# Patient Record
Sex: MALE | Race: WHITE | Employment: OTHER | URBAN - NONMETROPOLITAN AREA
[De-identification: names, ages, dates, MRNs, and addresses within clinical notes are randomized per-mention and may not be internally consistent; named-entity substitution may affect disease eponyms.]

---

## 2018-05-19 LAB
AVERAGE GLUCOSE: NORMAL
CHOLESTEROL, TOTAL: 127 MG/DL
CHOLESTEROL/HDL RATIO: NORMAL
CREATININE, URINE: 165.2
HBA1C MFR BLD: 6.5 %
HDLC SERPL-MCNC: 36 MG/DL (ref 35–70)
LDL CHOLESTEROL CALCULATED: 74 MG/DL (ref 0–160)
MICROALBUMIN/CREAT 24H UR: 2 MG/G{CREAT}
MICROALBUMIN/CREAT UR-RTO: 1.2
TRIGL SERPL-MCNC: 93 MG/DL
VLDLC SERPL CALC-MCNC: NORMAL MG/DL

## 2018-10-11 LAB
DIABETIC RETINOPATHY: NEGATIVE
DIABETIC RETINOPATHY: NEGATIVE

## 2019-05-24 ENCOUNTER — OFFICE VISIT (OUTPATIENT)
Dept: FAMILY MEDICINE CLINIC | Age: 69
End: 2019-05-24
Payer: MEDICARE

## 2019-05-24 ENCOUNTER — HOSPITAL ENCOUNTER (OUTPATIENT)
Age: 69
Discharge: HOME | End: 2019-05-26
Payer: MEDICARE

## 2019-05-24 VITALS
TEMPERATURE: 97.5 F | OXYGEN SATURATION: 92 % | SYSTOLIC BLOOD PRESSURE: 124 MMHG | HEART RATE: 98 BPM | DIASTOLIC BLOOD PRESSURE: 76 MMHG

## 2019-05-24 DIAGNOSIS — E55.9 VITAMIN D INSUFFICIENCY: ICD-10-CM

## 2019-05-24 DIAGNOSIS — N32.0 BLADDER NECK OBSTRUCTION: ICD-10-CM

## 2019-05-24 DIAGNOSIS — M51.9 LUMBAR DISC DISEASE: Primary | ICD-10-CM

## 2019-05-24 DIAGNOSIS — I10 ESSENTIAL HYPERTENSION: ICD-10-CM

## 2019-05-24 DIAGNOSIS — E11.9 TYPE 2 DIABETES MELLITUS WITHOUT COMPLICATION, WITHOUT LONG-TERM CURRENT USE OF INSULIN (HCC): ICD-10-CM

## 2019-05-24 DIAGNOSIS — M51.9 LUMBAR DISC DISEASE: ICD-10-CM

## 2019-05-24 DIAGNOSIS — E78.5 HYPERLIPIDEMIA, UNSPECIFIED HYPERLIPIDEMIA TYPE: ICD-10-CM

## 2019-05-24 DIAGNOSIS — F41.9 ANXIETY: ICD-10-CM

## 2019-05-24 LAB
ALBUMIN SERPL-MCNC: 3.7 G/DL (ref 3.5–5.2)
ALP BLD-CCNC: 102 U/L (ref 40–129)
ALT SERPL-CCNC: 14 U/L (ref 0–40)
AMPHETAMINE SCREEN, URINE: NOT DETECTED
ANION GAP SERPL CALCULATED.3IONS-SCNC: 14 MMOL/L (ref 7–16)
AST SERPL-CCNC: 19 U/L (ref 0–39)
BARBITURATE SCREEN URINE: NOT DETECTED
BASOPHILS ABSOLUTE: 0.04 E9/L (ref 0–0.2)
BASOPHILS RELATIVE PERCENT: 0.5 % (ref 0–2)
BENZODIAZEPINE SCREEN, URINE: NOT DETECTED
BILIRUB SERPL-MCNC: 0.4 MG/DL (ref 0–1.2)
BUN BLDV-MCNC: 24 MG/DL (ref 8–23)
CALCIUM SERPL-MCNC: 9.7 MG/DL (ref 8.6–10.2)
CANNABINOID SCREEN URINE: NOT DETECTED
CHLORIDE BLD-SCNC: 95 MMOL/L (ref 98–107)
CHOLESTEROL, TOTAL: 114 MG/DL (ref 0–199)
CO2: 31 MMOL/L (ref 22–29)
COCAINE METABOLITE SCREEN URINE: NOT DETECTED
CREAT SERPL-MCNC: 0.8 MG/DL (ref 0.7–1.2)
CREATININE URINE: 36 MG/DL (ref 40–278)
EOSINOPHILS ABSOLUTE: 0.47 E9/L (ref 0.05–0.5)
EOSINOPHILS RELATIVE PERCENT: 5.5 % (ref 0–6)
GFR AFRICAN AMERICAN: >60
GFR NON-AFRICAN AMERICAN: >60 ML/MIN/1.73
GLUCOSE BLD-MCNC: 139 MG/DL (ref 74–99)
HBA1C MFR BLD: 7 % (ref 4–5.6)
HCT VFR BLD CALC: 47.8 % (ref 37–54)
HDLC SERPL-MCNC: 39 MG/DL
HEMOGLOBIN: 13.9 G/DL (ref 12.5–16.5)
IMMATURE GRANULOCYTES #: 0.07 E9/L
IMMATURE GRANULOCYTES %: 0.8 % (ref 0–5)
LDL CHOLESTEROL CALCULATED: 62 MG/DL (ref 0–99)
LYMPHOCYTES ABSOLUTE: 1.42 E9/L (ref 1.5–4)
LYMPHOCYTES RELATIVE PERCENT: 16.6 % (ref 20–42)
MCH RBC QN AUTO: 25.9 PG (ref 26–35)
MCHC RBC AUTO-ENTMCNC: 29.1 % (ref 32–34.5)
MCV RBC AUTO: 89 FL (ref 80–99.9)
METHADONE SCREEN, URINE: NOT DETECTED
MICROALBUMIN UR-MCNC: <12 MG/L
MICROALBUMIN/CREAT UR-RTO: ABNORMAL (ref 0–30)
MONOCYTES ABSOLUTE: 0.68 E9/L (ref 0.1–0.95)
MONOCYTES RELATIVE PERCENT: 7.9 % (ref 2–12)
NEUTROPHILS ABSOLUTE: 5.9 E9/L (ref 1.8–7.3)
NEUTROPHILS RELATIVE PERCENT: 68.7 % (ref 43–80)
OPIATE SCREEN URINE: NOT DETECTED
PDW BLD-RTO: 17.6 FL (ref 11.5–15)
PHENCYCLIDINE SCREEN URINE: NOT DETECTED
PLATELET # BLD: 315 E9/L (ref 130–450)
PMV BLD AUTO: 10.7 FL (ref 7–12)
POTASSIUM SERPL-SCNC: 3.9 MMOL/L (ref 3.5–5)
PROPOXYPHENE SCREEN: NOT DETECTED
PROSTATE SPECIFIC ANTIGEN: 2.87 NG/ML (ref 0–4)
RBC # BLD: 5.37 E12/L (ref 3.8–5.8)
SODIUM BLD-SCNC: 140 MMOL/L (ref 132–146)
TOTAL PROTEIN: 7.3 G/DL (ref 6.4–8.3)
TRIGL SERPL-MCNC: 65 MG/DL (ref 0–149)
URIC ACID, SERUM: 9.2 MG/DL (ref 3.4–7)
VITAMIN D 25-HYDROXY: 36 NG/ML (ref 30–100)
VLDLC SERPL CALC-MCNC: 13 MG/DL
WBC # BLD: 8.6 E9/L (ref 4.5–11.5)

## 2019-05-24 PROCEDURE — 85025 COMPLETE CBC W/AUTO DIFF WBC: CPT

## 2019-05-24 PROCEDURE — 36415 COLL VENOUS BLD VENIPUNCTURE: CPT

## 2019-05-24 PROCEDURE — G0103 PSA SCREENING: HCPCS

## 2019-05-24 PROCEDURE — 82306 VITAMIN D 25 HYDROXY: CPT

## 2019-05-24 PROCEDURE — 99215 OFFICE O/P EST HI 40 MIN: CPT | Performed by: FAMILY MEDICINE

## 2019-05-24 PROCEDURE — 82044 UR ALBUMIN SEMIQUANTITATIVE: CPT

## 2019-05-24 PROCEDURE — 83036 HEMOGLOBIN GLYCOSYLATED A1C: CPT

## 2019-05-24 PROCEDURE — 82570 ASSAY OF URINE CREATININE: CPT

## 2019-05-24 PROCEDURE — 80061 LIPID PANEL: CPT

## 2019-05-24 PROCEDURE — 80307 DRUG TEST PRSMV CHEM ANLYZR: CPT

## 2019-05-24 PROCEDURE — 80053 COMPREHEN METABOLIC PANEL: CPT

## 2019-05-24 PROCEDURE — 84550 ASSAY OF BLOOD/URIC ACID: CPT

## 2019-05-24 RX ORDER — SULFAMETHOXAZOLE AND TRIMETHOPRIM 800; 160 MG/1; MG/1
TABLET ORAL
Refills: 4 | COMMUNITY
Start: 2019-03-14 | End: 2019-05-24

## 2019-05-24 RX ORDER — HYDROCHLOROTHIAZIDE 25 MG/1
TABLET ORAL
Qty: 90 TABLET | Refills: 1 | Status: SHIPPED | OUTPATIENT
Start: 2019-05-24 | End: 2019-11-25 | Stop reason: SDUPTHER

## 2019-05-24 RX ORDER — LOSARTAN POTASSIUM 50 MG/1
TABLET ORAL
Refills: 1 | COMMUNITY
Start: 2019-02-28 | End: 2019-05-24 | Stop reason: SDUPTHER

## 2019-05-24 RX ORDER — HYDROCODONE BITARTRATE AND ACETAMINOPHEN 7.5; 325 MG/1; MG/1
1 TABLET ORAL EVERY 6 HOURS PRN
Qty: 120 TABLET | Refills: 0 | Status: SHIPPED | OUTPATIENT
Start: 2019-05-24 | End: 2019-07-03 | Stop reason: SDUPTHER

## 2019-05-24 RX ORDER — NAPROXEN 500 MG/1
TABLET ORAL
Refills: 1 | COMMUNITY
Start: 2019-02-28 | End: 2019-05-24 | Stop reason: SDUPTHER

## 2019-05-24 RX ORDER — FUROSEMIDE 40 MG/1
TABLET ORAL
Qty: 180 TABLET | Refills: 1 | Status: SHIPPED | OUTPATIENT
Start: 2019-05-24 | End: 2019-11-25 | Stop reason: SDUPTHER

## 2019-05-24 RX ORDER — POTASSIUM CHLORIDE 20 MEQ/1
TABLET, EXTENDED RELEASE ORAL
Qty: 180 TABLET | Refills: 1 | Status: SHIPPED | OUTPATIENT
Start: 2019-05-24 | End: 2019-05-24

## 2019-05-24 RX ORDER — PIOGLITAZONEHYDROCHLORIDE 45 MG/1
TABLET ORAL
Refills: 1 | COMMUNITY
Start: 2019-02-28 | End: 2019-05-24 | Stop reason: SDUPTHER

## 2019-05-24 RX ORDER — HYDROCODONE BITARTRATE AND ACETAMINOPHEN 7.5; 325 MG/1; MG/1
TABLET ORAL
Refills: 0 | COMMUNITY
Start: 2019-02-28 | End: 2019-05-24 | Stop reason: SDUPTHER

## 2019-05-24 RX ORDER — FUROSEMIDE 40 MG/1
TABLET ORAL
Refills: 1 | COMMUNITY
Start: 2019-02-28 | End: 2019-05-24 | Stop reason: SDUPTHER

## 2019-05-24 RX ORDER — SULFAMETHOXAZOLE AND TRIMETHOPRIM 400; 80 MG/1; MG/1
1 TABLET ORAL 2 TIMES DAILY
Qty: 20 TABLET | Refills: 1 | Status: SHIPPED | OUTPATIENT
Start: 2019-05-24 | End: 2019-06-13

## 2019-05-24 RX ORDER — POTASSIUM CHLORIDE 20 MEQ/1
TABLET, EXTENDED RELEASE ORAL
COMMUNITY
Start: 2018-08-28 | End: 2019-05-24

## 2019-05-24 RX ORDER — PIOGLITAZONEHYDROCHLORIDE 45 MG/1
TABLET ORAL
Qty: 90 TABLET | Refills: 1 | Status: SHIPPED | OUTPATIENT
Start: 2019-05-24 | End: 2019-11-25 | Stop reason: SDUPTHER

## 2019-05-24 RX ORDER — ATORVASTATIN CALCIUM 20 MG/1
TABLET, FILM COATED ORAL
Qty: 90 TABLET | Refills: 1 | Status: SHIPPED | OUTPATIENT
Start: 2019-05-24 | End: 2019-11-25 | Stop reason: SDUPTHER

## 2019-05-24 RX ORDER — SULFAMETHOXAZOLE AND TRIMETHOPRIM 400; 80 MG/1; MG/1
1 TABLET ORAL DAILY
Qty: 20 TABLET | Refills: 1 | Status: SHIPPED | OUTPATIENT
Start: 2019-05-24 | End: 2019-05-24 | Stop reason: SDUPTHER

## 2019-05-24 RX ORDER — LORAZEPAM 1 MG/1
TABLET ORAL
Qty: 180 TABLET | Refills: 0 | Status: SHIPPED | OUTPATIENT
Start: 2019-05-24 | End: 2019-08-26 | Stop reason: SDUPTHER

## 2019-05-24 RX ORDER — NAPROXEN 500 MG/1
TABLET ORAL
Qty: 180 TABLET | Refills: 1 | Status: SHIPPED | OUTPATIENT
Start: 2019-05-24 | End: 2019-07-01 | Stop reason: SDUPTHER

## 2019-05-24 RX ORDER — POTASSIUM CHLORIDE 20 MEQ/1
TABLET, EXTENDED RELEASE ORAL
Qty: 90 TABLET | Refills: 1 | Status: SHIPPED | OUTPATIENT
Start: 2019-05-24 | End: 2019-11-25 | Stop reason: SDUPTHER

## 2019-05-24 RX ORDER — GLIMEPIRIDE 4 MG/1
TABLET ORAL
Qty: 180 TABLET | Refills: 1 | Status: SHIPPED | OUTPATIENT
Start: 2019-05-24 | End: 2019-11-25 | Stop reason: SDUPTHER

## 2019-05-24 RX ORDER — HYDROCHLOROTHIAZIDE 25 MG/1
TABLET ORAL
Refills: 1 | COMMUNITY
Start: 2019-02-28 | End: 2019-05-24 | Stop reason: SDUPTHER

## 2019-05-24 RX ORDER — LORAZEPAM 1 MG/1
TABLET ORAL
Refills: 0 | COMMUNITY
Start: 2019-02-28 | End: 2019-05-24 | Stop reason: SDUPTHER

## 2019-05-24 RX ORDER — GLIMEPIRIDE 4 MG/1
TABLET ORAL
Refills: 1 | COMMUNITY
Start: 2019-02-28 | End: 2019-05-24 | Stop reason: SDUPTHER

## 2019-05-24 RX ORDER — ATORVASTATIN CALCIUM 20 MG/1
TABLET, FILM COATED ORAL
Refills: 1 | COMMUNITY
Start: 2019-02-28 | End: 2019-05-24 | Stop reason: SDUPTHER

## 2019-05-24 RX ORDER — POTASSIUM CHLORIDE 20 MEQ/1
TABLET, EXTENDED RELEASE ORAL
Refills: 1 | COMMUNITY
Start: 2019-02-28 | End: 2019-05-24 | Stop reason: SDUPTHER

## 2019-05-24 RX ORDER — LOSARTAN POTASSIUM 50 MG/1
TABLET ORAL
Qty: 90 TABLET | Refills: 1 | Status: SHIPPED | OUTPATIENT
Start: 2019-05-24 | End: 2019-11-25 | Stop reason: SDUPTHER

## 2019-05-24 RX ORDER — MULTIVIT-MIN/IRON/FOLIC ACID/K 18-600-40
2000 CAPSULE ORAL
COMMUNITY
End: 2020-08-04 | Stop reason: SDUPTHER

## 2019-05-24 RX ORDER — EXENATIDE 2 MG/.85ML
INJECTION, SUSPENSION, EXTENDED RELEASE SUBCUTANEOUS
Refills: 5 | COMMUNITY
Start: 2019-05-10 | End: 2020-05-28 | Stop reason: SDUPTHER

## 2019-05-24 NOTE — PROGRESS NOTES
5/24/19    Chief Complaint   Patient presents with    Diabetes    Hyperlipidemia    Hypertension    Joint Pain        S: here for PE of chronic medical problems, med recheck/refill, FBW, Oarrs review, drug screen. No family history on file. No past surgical history on file. Social History     Tobacco Use    Smoking status: Not on file   Substance Use Topics    Alcohol use: Not on file    Drug use: Not on file       ROS:  No CP, No palpitations,   No sob, No cough,   No abd pain, No heartburn,   No headaches,   No tingling, No numbness, No weakness,   No bowel changes, No hematochezia, No melena,  No bladder changes, No hematuria  No skin rashes, No skin lesions. No vision changes, No hearing changes,   No polyuria, polydipsia, polyphagia. Stable mood. ROS otherwise negative unless as listed in HPI. Chart reviewed and updated where appropriate for PMH, Fam, and Soc Hx. Physical Exam   /76   Pulse 98   Temp 97.5 °F (36.4 °C)   SpO2 92%   Wt Readings from Last 3 Encounters:   No data found for Wt       Constitutional:    He is oriented to person, place, and time. He appears well-developed and well-nourished. HENT:    Right Ear: Tympanic membrane, external ear and ear canal normal.    Left Ear: Tympanic membrane, external ear and ear canal normal.    Nose: Nose normal.    Mouth/Throat: Oropharynx is clear and moist.   Eyes:    Conjunctivae are normal.    Pupils are equal, round, and reactive to light. EOMI. Neck:    Normal range of motion. No thyromegaly or nodules noted. No bruit. Cardiovascular:    Normal rate, regular rhythm and normal heart sounds. No murmur. No gallop and no friction rub. Pulmonary/Chest:    Effort normal and breath sounds normal.    No wheezes. No rales or rhonchi. Abdominal:    Soft. Bowel sounds are normal.    No distension. No tenderness. Musculoskeletal: Patient here on electric scooter. Normal range of motion.      No joint swelling noted.    Peripheral edema noted. Neurological:    He is alert and oriented to person, place, and time. Motor and sensation grossly intact. Skin:    Skin is warm and dry. No rashes, lesions. Psychiatric:    He has a normal mood and affect. Normal groom and dress. Current Outpatient Medications on File Prior to Visit   Medication Sig Dispense Refill    BYDUREON BCISE 2 MG/0.85ML AUIJ INJECT 0.85 ML Q WEEK  5    Cholecalciferol (VITAMIN D) 2000 units CAPS capsule Take 2,000 Units by mouth      aspirin 81 MG tablet Take 81 mg by mouth daily       No current facility-administered medications on file prior to visit. There is no problem list on file for this patient. Assessment / Robi Linn was seen today for diabetes, hyperlipidemia, hypertension and joint pain. Diagnoses and all orders for this visit:    Lumbar disc disease  -     HYDROcodone-acetaminophen (NORCO) 7.5-325 MG per tablet; Take 1 tablet by mouth every 6 hours as needed for Pain for up to 30 days.  -     URINE DRUG SCREEN; Future    Anxiety  -     LORazepam (ATIVAN) 1 MG tablet; TK 1 T PO BID    Essential hypertension  -     CBC Auto Differential; Future  -     Comprehensive Metabolic Panel; Future  -     Uric Acid; Future    Hyperlipidemia, unspecified hyperlipidemia type  -     Lipid Panel  -     CK isoenzymes; Future    Type 2 diabetes mellitus without complication, without long-term current use of insulin ([facility])  -     Hemoglobin A1C; Future  -     Microalbumin / Creatinine Urine Ratio; Future    Vitamin D insufficiency  -     Vitamin D 25 Hydroxy; Future    Bladder neck obstruction  -     Psa screening;  Future    Other orders  -     potassium chloride (KLOR-CON M) 20 MEQ extended release tablet; TK 1 T PO QD  -     pioglitazone (ACTOS) 45 MG tablet; TK 1 T PO QD  -     naproxen (NAPROSYN) 500 MG tablet; TK 1 T PO BID  -     losartan (COZAAR) 50 MG tablet; TK 1 T PO QD  -     hydrochlorothiazide (HYDRODIURIL) 25 MG tablet; TK 1 T PO QD  -     furosemide (LASIX) 40 MG tablet; TK 1 T PO BID  -     glimepiride (AMARYL) 4 MG tablet; TK 1 T PO BID B MEALS  -     atorvastatin (LIPITOR) 20 MG tablet; TK 1 T PO Q NIGHT  -     sulfamethoxazole-trimethoprim (BACTRIM) 400-80 MG per tablet; Take 1 tablet by mouth daily for 10 days      Reviewed Pmhx, meds, diet, Oarrs. Rx written. Recheck 3 months. No follow-ups on file. Patient counseled to follow up sooner or seek more acute care if symptoms worsening. Electronically signed by Kai Escobar DO on 5/24/2019    This note may have been created using dictation software.  Efforts were made to reduce grammatical or syntax errors, but some may persist.

## 2019-05-29 LAB — TOTAL CK: 25 U/L (ref 20–200)

## 2019-05-31 ENCOUNTER — TELEPHONE (OUTPATIENT)
Dept: FAMILY MEDICINE CLINIC | Age: 69
End: 2019-05-31

## 2019-07-01 RX ORDER — NAPROXEN 500 MG/1
TABLET ORAL
Qty: 180 TABLET | Refills: 1 | Status: SHIPPED | OUTPATIENT
Start: 2019-07-01 | End: 2019-11-25 | Stop reason: SDUPTHER

## 2019-07-03 DIAGNOSIS — M51.9 LUMBAR DISC DISEASE: ICD-10-CM

## 2019-07-03 RX ORDER — HYDROCODONE BITARTRATE AND ACETAMINOPHEN 7.5; 325 MG/1; MG/1
1 TABLET ORAL EVERY 6 HOURS PRN
Qty: 120 TABLET | Refills: 0 | Status: CANCELLED | OUTPATIENT
Start: 2019-07-03 | End: 2019-08-02

## 2019-07-03 RX ORDER — HYDROCODONE BITARTRATE AND ACETAMINOPHEN 7.5; 325 MG/1; MG/1
1 TABLET ORAL EVERY 6 HOURS PRN
Qty: 120 TABLET | Refills: 0 | Status: SHIPPED | OUTPATIENT
Start: 2019-07-03 | End: 2019-07-03 | Stop reason: SDUPTHER

## 2019-07-03 RX ORDER — HYDROCODONE BITARTRATE AND ACETAMINOPHEN 7.5; 325 MG/1; MG/1
1 TABLET ORAL EVERY 6 HOURS PRN
Qty: 120 TABLET | Refills: 0 | Status: SHIPPED | OUTPATIENT
Start: 2019-07-31 | End: 2019-08-26 | Stop reason: SDUPTHER

## 2019-08-16 ENCOUNTER — TELEPHONE (OUTPATIENT)
Dept: ADMINISTRATIVE | Age: 69
End: 2019-08-16

## 2019-08-16 NOTE — TELEPHONE ENCOUNTER
Pt called states that he received a call for AWV however, states that there was a nurse that came to his home and performed same visit he believes declined on scheduling at this time-cc

## 2019-08-26 ENCOUNTER — HOSPITAL ENCOUNTER (OUTPATIENT)
Age: 69
Discharge: HOME | End: 2019-08-28
Payer: MEDICARE

## 2019-08-26 ENCOUNTER — OFFICE VISIT (OUTPATIENT)
Dept: FAMILY MEDICINE CLINIC | Age: 69
End: 2019-08-26
Payer: MEDICARE

## 2019-08-26 VITALS
OXYGEN SATURATION: 94 % | HEART RATE: 84 BPM | SYSTOLIC BLOOD PRESSURE: 128 MMHG | DIASTOLIC BLOOD PRESSURE: 82 MMHG | TEMPERATURE: 97.7 F

## 2019-08-26 DIAGNOSIS — F41.9 ANXIETY: ICD-10-CM

## 2019-08-26 DIAGNOSIS — E78.5 HYPERLIPIDEMIA, UNSPECIFIED HYPERLIPIDEMIA TYPE: ICD-10-CM

## 2019-08-26 DIAGNOSIS — I10 ESSENTIAL HYPERTENSION: Primary | ICD-10-CM

## 2019-08-26 DIAGNOSIS — E11.9 TYPE 2 DIABETES MELLITUS WITHOUT COMPLICATION, WITHOUT LONG-TERM CURRENT USE OF INSULIN (HCC): ICD-10-CM

## 2019-08-26 DIAGNOSIS — Z12.5 ENCOUNTER FOR SCREENING FOR MALIGNANT NEOPLASM OF PROSTATE: ICD-10-CM

## 2019-08-26 DIAGNOSIS — E03.9 HYPOTHYROIDISM, UNSPECIFIED TYPE: ICD-10-CM

## 2019-08-26 DIAGNOSIS — E55.9 VITAMIN D INSUFFICIENCY: ICD-10-CM

## 2019-08-26 DIAGNOSIS — M51.9 LUMBAR DISC DISEASE: ICD-10-CM

## 2019-08-26 DIAGNOSIS — I10 ESSENTIAL HYPERTENSION: ICD-10-CM

## 2019-08-26 LAB
ALBUMIN SERPL-MCNC: 3.7 G/DL (ref 3.5–5.2)
ALP BLD-CCNC: 97 U/L (ref 40–129)
ALT SERPL-CCNC: 17 U/L (ref 0–40)
AMPHETAMINE SCREEN, URINE: NOT DETECTED
ANION GAP SERPL CALCULATED.3IONS-SCNC: 10 MMOL/L (ref 7–16)
AST SERPL-CCNC: 20 U/L (ref 0–39)
BARBITURATE SCREEN URINE: NOT DETECTED
BASOPHILS ABSOLUTE: 0.03 E9/L (ref 0–0.2)
BASOPHILS RELATIVE PERCENT: 0.4 % (ref 0–2)
BENZODIAZEPINE SCREEN, URINE: NOT DETECTED
BILIRUB SERPL-MCNC: 0.3 MG/DL (ref 0–1.2)
BUN BLDV-MCNC: 28 MG/DL (ref 8–23)
CALCIUM SERPL-MCNC: 9.4 MG/DL (ref 8.6–10.2)
CANNABINOID SCREEN URINE: NOT DETECTED
CHLORIDE BLD-SCNC: 98 MMOL/L (ref 98–107)
CHOLESTEROL, TOTAL: 132 MG/DL (ref 0–199)
CO2: 33 MMOL/L (ref 22–29)
COCAINE METABOLITE SCREEN URINE: NOT DETECTED
CREAT SERPL-MCNC: 0.8 MG/DL (ref 0.7–1.2)
CREATININE URINE: 31 MG/DL (ref 40–278)
EOSINOPHILS ABSOLUTE: 0.17 E9/L (ref 0.05–0.5)
EOSINOPHILS RELATIVE PERCENT: 2 % (ref 0–6)
GFR AFRICAN AMERICAN: >60
GFR NON-AFRICAN AMERICAN: >60 ML/MIN/1.73
GLUCOSE BLD-MCNC: 169 MG/DL (ref 74–99)
HBA1C MFR BLD: 7.4 % (ref 4–5.6)
HCT VFR BLD CALC: 47 % (ref 37–54)
HDLC SERPL-MCNC: 36 MG/DL
HEMOGLOBIN: 14 G/DL (ref 12.5–16.5)
IMMATURE GRANULOCYTES #: 0.06 E9/L
IMMATURE GRANULOCYTES %: 0.7 % (ref 0–5)
LDL CHOLESTEROL CALCULATED: 71 MG/DL (ref 0–99)
LYMPHOCYTES ABSOLUTE: 1.37 E9/L (ref 1.5–4)
LYMPHOCYTES RELATIVE PERCENT: 16.1 % (ref 20–42)
Lab: NORMAL
MCH RBC QN AUTO: 26.7 PG (ref 26–35)
MCHC RBC AUTO-ENTMCNC: 29.8 % (ref 32–34.5)
MCV RBC AUTO: 89.5 FL (ref 80–99.9)
METHADONE SCREEN, URINE: NOT DETECTED
MICROALBUMIN UR-MCNC: <12 MG/L
MICROALBUMIN/CREAT UR-RTO: ABNORMAL (ref 0–30)
MONOCYTES ABSOLUTE: 0.74 E9/L (ref 0.1–0.95)
MONOCYTES RELATIVE PERCENT: 8.7 % (ref 2–12)
NEUTROPHILS ABSOLUTE: 6.12 E9/L (ref 1.8–7.3)
NEUTROPHILS RELATIVE PERCENT: 72.1 % (ref 43–80)
OPIATE SCREEN URINE: NOT DETECTED
PDW BLD-RTO: 17.8 FL (ref 11.5–15)
PHENCYCLIDINE SCREEN URINE: NOT DETECTED
PLATELET # BLD: 295 E9/L (ref 130–450)
PMV BLD AUTO: 10.8 FL (ref 7–12)
POTASSIUM SERPL-SCNC: 3.7 MMOL/L (ref 3.5–5)
PROPOXYPHENE SCREEN: NOT DETECTED
PROSTATE SPECIFIC ANTIGEN: 2.53 NG/ML (ref 0–4)
RBC # BLD: 5.25 E12/L (ref 3.8–5.8)
SODIUM BLD-SCNC: 141 MMOL/L (ref 132–146)
T4 FREE: 1.34 NG/DL (ref 0.93–1.7)
TOTAL CK: 25 U/L (ref 20–200)
TOTAL PROTEIN: 7 G/DL (ref 6.4–8.3)
TRIGL SERPL-MCNC: 127 MG/DL (ref 0–149)
TSH SERPL DL<=0.05 MIU/L-ACNC: 1.1 UIU/ML (ref 0.27–4.2)
URIC ACID, SERUM: 8.9 MG/DL (ref 3.4–7)
VITAMIN D 25-HYDROXY: 34 NG/ML (ref 30–100)
VLDLC SERPL CALC-MCNC: 25 MG/DL
WBC # BLD: 8.5 E9/L (ref 4.5–11.5)

## 2019-08-26 PROCEDURE — 99214 OFFICE O/P EST MOD 30 MIN: CPT | Performed by: FAMILY MEDICINE

## 2019-08-26 PROCEDURE — 82306 VITAMIN D 25 HYDROXY: CPT

## 2019-08-26 PROCEDURE — 84439 ASSAY OF FREE THYROXINE: CPT

## 2019-08-26 PROCEDURE — 82550 ASSAY OF CK (CPK): CPT

## 2019-08-26 PROCEDURE — 84550 ASSAY OF BLOOD/URIC ACID: CPT

## 2019-08-26 PROCEDURE — 85025 COMPLETE CBC W/AUTO DIFF WBC: CPT

## 2019-08-26 PROCEDURE — 83036 HEMOGLOBIN GLYCOSYLATED A1C: CPT

## 2019-08-26 PROCEDURE — 80307 DRUG TEST PRSMV CHEM ANLYZR: CPT

## 2019-08-26 PROCEDURE — 36415 COLL VENOUS BLD VENIPUNCTURE: CPT

## 2019-08-26 PROCEDURE — 80061 LIPID PANEL: CPT

## 2019-08-26 PROCEDURE — 82570 ASSAY OF URINE CREATININE: CPT

## 2019-08-26 PROCEDURE — 80053 COMPREHEN METABOLIC PANEL: CPT

## 2019-08-26 PROCEDURE — 82044 UR ALBUMIN SEMIQUANTITATIVE: CPT

## 2019-08-26 PROCEDURE — G0103 PSA SCREENING: HCPCS

## 2019-08-26 PROCEDURE — 84443 ASSAY THYROID STIM HORMONE: CPT

## 2019-08-26 RX ORDER — HYDROCODONE BITARTRATE AND ACETAMINOPHEN 7.5; 325 MG/1; MG/1
1 TABLET ORAL EVERY 6 HOURS PRN
Qty: 120 TABLET | Refills: 0 | Status: SHIPPED | OUTPATIENT
Start: 2019-09-25 | End: 2019-08-26 | Stop reason: SDUPTHER

## 2019-08-26 RX ORDER — HYDROCODONE BITARTRATE AND ACETAMINOPHEN 7.5; 325 MG/1; MG/1
1 TABLET ORAL EVERY 6 HOURS PRN
Qty: 120 TABLET | Refills: 0 | Status: SHIPPED | OUTPATIENT
Start: 2019-10-24 | End: 2019-11-23

## 2019-08-26 RX ORDER — LORAZEPAM 1 MG/1
TABLET ORAL
Qty: 180 TABLET | Refills: 0 | Status: SHIPPED | OUTPATIENT
Start: 2019-08-26 | End: 2019-11-25 | Stop reason: SDUPTHER

## 2019-08-26 RX ORDER — HYDROCODONE BITARTRATE AND ACETAMINOPHEN 7.5; 325 MG/1; MG/1
1 TABLET ORAL EVERY 6 HOURS PRN
Qty: 120 TABLET | Refills: 0 | Status: SHIPPED | OUTPATIENT
Start: 2019-08-26 | End: 2019-08-26 | Stop reason: SDUPTHER

## 2019-08-26 ASSESSMENT — ENCOUNTER SYMPTOMS
COLOR CHANGE: 0
EYE DISCHARGE: 0
SORE THROAT: 0
NAUSEA: 0
BACK PAIN: 1
TROUBLE SWALLOWING: 0
SINUS PAIN: 0
COUGH: 0
WHEEZING: 0
ABDOMINAL PAIN: 0
ALLERGIC/IMMUNOLOGIC NEGATIVE: 1
EYE PAIN: 0
SHORTNESS OF BREATH: 0
DIARRHEA: 0
CONSTIPATION: 0
VOMITING: 0
CHEST TIGHTNESS: 0

## 2019-08-26 ASSESSMENT — PATIENT HEALTH QUESTIONNAIRE - PHQ9
SUM OF ALL RESPONSES TO PHQ QUESTIONS 1-9: 0
SUM OF ALL RESPONSES TO PHQ QUESTIONS 1-9: 0
1. LITTLE INTEREST OR PLEASURE IN DOING THINGS: 0
2. FEELING DOWN, DEPRESSED OR HOPELESS: 0
SUM OF ALL RESPONSES TO PHQ9 QUESTIONS 1 & 2: 0

## 2019-08-26 NOTE — PROGRESS NOTES
Rfl:     aspirin 81 MG tablet, Take 81 mg by mouth daily, Disp: , Rfl:     pioglitazone (ACTOS) 45 MG tablet, TK 1 T PO QD, Disp: 90 tablet, Rfl: 1    losartan (COZAAR) 50 MG tablet, TK 1 T PO QD, Disp: 90 tablet, Rfl: 1    hydrochlorothiazide (HYDRODIURIL) 25 MG tablet, TK 1 T PO QD, Disp: 90 tablet, Rfl: 1    furosemide (LASIX) 40 MG tablet, TK 1 T PO BID, Disp: 180 tablet, Rfl: 1    glimepiride (AMARYL) 4 MG tablet, TK 1 T PO BID B MEALS, Disp: 180 tablet, Rfl: 1    atorvastatin (LIPITOR) 20 MG tablet, TK 1 T PO Q NIGHT, Disp: 90 tablet, Rfl: 1    potassium chloride (KLOR-CON M) 20 MEQ extended release tablet, TK 1 T PO QD, Disp: 90 tablet, Rfl: 1  Allergies   Allergen Reactions    Ciprofloxacin Hcl     Cleocin [Clindamycin Hcl]     Clindamycin/Lincomycin     Codeine     Eucerin [Albolene]     Januvia [Sitagliptin]     Keflex [Cephalexin]     Lisinopril     Metformin And Related     Zithromax [Azithromycin]        No past medical history on file. No past surgical history on file. No family history on file.   Social History     Socioeconomic History    Marital status: Unknown     Spouse name: Not on file    Number of children: Not on file    Years of education: Not on file    Highest education level: Not on file   Occupational History    Not on file   Social Needs    Financial resource strain: Not on file    Food insecurity:     Worry: Not on file     Inability: Not on file    Transportation needs:     Medical: Not on file     Non-medical: Not on file   Tobacco Use    Smoking status: Never Smoker    Smokeless tobacco: Never Used   Substance and Sexual Activity    Alcohol use: Not on file    Drug use: Not on file    Sexual activity: Not on file   Lifestyle    Physical activity:     Days per week: Not on file     Minutes per session: Not on file    Stress: Not on file   Relationships    Social connections:     Talks on phone: Not on file     Gets together: Not on file     Attends

## 2019-10-08 RX ORDER — HYDROCODONE BITARTRATE AND ACETAMINOPHEN 7.5; 325 MG/1; MG/1
1 TABLET ORAL 4 TIMES DAILY
COMMUNITY
End: 2019-11-25 | Stop reason: SDUPTHER

## 2019-10-08 RX ORDER — PEN NEEDLE, DIABETIC 30 GX3/16"
NEEDLE, DISPOSABLE MISCELLANEOUS 2 TIMES DAILY
COMMUNITY
End: 2021-05-03 | Stop reason: SDUPTHER

## 2019-10-08 RX ORDER — SULFAMETHOXAZOLE AND TRIMETHOPRIM 800; 160 MG/1; MG/1
1 TABLET ORAL 2 TIMES DAILY
COMMUNITY
End: 2020-03-03

## 2019-10-08 SDOH — HEALTH STABILITY: MENTAL HEALTH: HOW OFTEN DO YOU HAVE A DRINK CONTAINING ALCOHOL?: NEVER

## 2019-11-25 ENCOUNTER — OFFICE VISIT (OUTPATIENT)
Dept: FAMILY MEDICINE CLINIC | Age: 69
End: 2019-11-25
Payer: MEDICARE

## 2019-11-25 VITALS
TEMPERATURE: 98.4 F | OXYGEN SATURATION: 94 % | HEIGHT: 70 IN | HEART RATE: 98 BPM | SYSTOLIC BLOOD PRESSURE: 118 MMHG | DIASTOLIC BLOOD PRESSURE: 86 MMHG

## 2019-11-25 DIAGNOSIS — I10 ESSENTIAL HYPERTENSION: ICD-10-CM

## 2019-11-25 DIAGNOSIS — F41.9 ANXIETY: ICD-10-CM

## 2019-11-25 DIAGNOSIS — I89.0 LYMPHEDEMA: ICD-10-CM

## 2019-11-25 DIAGNOSIS — M51.16 LUMBAR DISC DISEASE WITH RADICULOPATHY: Primary | ICD-10-CM

## 2019-11-25 DIAGNOSIS — E11.9 TYPE 2 DIABETES MELLITUS WITHOUT COMPLICATION, WITHOUT LONG-TERM CURRENT USE OF INSULIN (HCC): ICD-10-CM

## 2019-11-25 DIAGNOSIS — J01.90 ACUTE NON-RECURRENT SINUSITIS, UNSPECIFIED LOCATION: ICD-10-CM

## 2019-11-25 PROCEDURE — 99214 OFFICE O/P EST MOD 30 MIN: CPT | Performed by: FAMILY MEDICINE

## 2019-11-25 PROCEDURE — 90653 IIV ADJUVANT VACCINE IM: CPT | Performed by: FAMILY MEDICINE

## 2019-11-25 PROCEDURE — G0008 ADMIN INFLUENZA VIRUS VAC: HCPCS | Performed by: FAMILY MEDICINE

## 2019-11-25 RX ORDER — NAPROXEN 500 MG/1
TABLET ORAL
Qty: 180 TABLET | Refills: 1 | Status: SHIPPED
Start: 2019-11-25 | End: 2020-03-03

## 2019-11-25 RX ORDER — HYDROCHLOROTHIAZIDE 25 MG/1
TABLET ORAL
Qty: 90 TABLET | Refills: 1 | Status: SHIPPED
Start: 2019-11-25 | End: 2020-03-03 | Stop reason: ALTCHOICE

## 2019-11-25 RX ORDER — LOSARTAN POTASSIUM 50 MG/1
TABLET ORAL
Qty: 90 TABLET | Refills: 1 | Status: SHIPPED
Start: 2019-11-25 | End: 2020-03-03 | Stop reason: SDUPTHER

## 2019-11-25 RX ORDER — AMOXICILLIN AND CLAVULANATE POTASSIUM 875; 125 MG/1; MG/1
1 TABLET, FILM COATED ORAL 2 TIMES DAILY
Qty: 20 TABLET | Refills: 0 | Status: SHIPPED | OUTPATIENT
Start: 2019-11-25 | End: 2019-12-05

## 2019-11-25 RX ORDER — PIOGLITAZONEHYDROCHLORIDE 45 MG/1
TABLET ORAL
Qty: 90 TABLET | Refills: 1 | Status: SHIPPED
Start: 2019-11-25 | End: 2020-03-03 | Stop reason: SDUPTHER

## 2019-11-25 RX ORDER — HYDROCODONE BITARTRATE AND ACETAMINOPHEN 7.5; 325 MG/1; MG/1
1 TABLET ORAL 4 TIMES DAILY
Qty: 120 TABLET | Refills: 0 | Status: SHIPPED | OUTPATIENT
Start: 2019-12-24 | End: 2019-11-25 | Stop reason: SDUPTHER

## 2019-11-25 RX ORDER — HYDROCODONE BITARTRATE AND ACETAMINOPHEN 7.5; 325 MG/1; MG/1
1 TABLET ORAL 4 TIMES DAILY
Qty: 120 TABLET | Refills: 0 | Status: SHIPPED | OUTPATIENT
Start: 2019-11-25 | End: 2019-11-25 | Stop reason: SDUPTHER

## 2019-11-25 RX ORDER — ATORVASTATIN CALCIUM 20 MG/1
TABLET, FILM COATED ORAL
Qty: 90 TABLET | Refills: 1 | Status: SHIPPED
Start: 2019-11-25 | End: 2020-03-03 | Stop reason: SDUPTHER

## 2019-11-25 RX ORDER — FUROSEMIDE 40 MG/1
TABLET ORAL
Qty: 180 TABLET | Refills: 1 | Status: SHIPPED
Start: 2019-11-25 | End: 2020-03-03 | Stop reason: ALTCHOICE

## 2019-11-25 RX ORDER — LORAZEPAM 1 MG/1
TABLET ORAL
Qty: 180 TABLET | Refills: 0 | Status: SHIPPED | OUTPATIENT
Start: 2019-11-25 | End: 2020-03-03 | Stop reason: SDUPTHER

## 2019-11-25 RX ORDER — POTASSIUM CHLORIDE 20 MEQ/1
TABLET, EXTENDED RELEASE ORAL
Qty: 90 TABLET | Refills: 1 | Status: SHIPPED
Start: 2019-11-25 | End: 2020-03-03

## 2019-11-25 RX ORDER — ALBUTEROL SULFATE 90 UG/1
2 AEROSOL, METERED RESPIRATORY (INHALATION) 4 TIMES DAILY PRN
Qty: 1 INHALER | Refills: 3 | Status: SHIPPED
Start: 2019-11-25 | End: 2020-04-30 | Stop reason: SDUPTHER

## 2019-11-25 RX ORDER — HYDROCODONE BITARTRATE AND ACETAMINOPHEN 7.5; 325 MG/1; MG/1
1 TABLET ORAL 4 TIMES DAILY
Qty: 120 TABLET | Refills: 0 | Status: SHIPPED | OUTPATIENT
Start: 2020-01-23 | End: 2020-03-03 | Stop reason: SDUPTHER

## 2019-11-25 RX ORDER — GLIMEPIRIDE 4 MG/1
TABLET ORAL
Qty: 180 TABLET | Refills: 1 | Status: SHIPPED
Start: 2019-11-25 | End: 2020-03-03 | Stop reason: SDUPTHER

## 2020-01-10 ENCOUNTER — TELEPHONE (OUTPATIENT)
Dept: FAMILY MEDICINE CLINIC | Age: 70
End: 2020-01-10

## 2020-01-10 NOTE — TELEPHONE ENCOUNTER
Wife calling in stating patient's groin area is all swollen and the wound on his leg is still open would like to know if an antibiotic could be called in .

## 2020-01-17 ENCOUNTER — OFFICE VISIT (OUTPATIENT)
Dept: FAMILY MEDICINE CLINIC | Age: 70
End: 2020-01-17
Payer: MEDICARE

## 2020-01-17 VITALS — SYSTOLIC BLOOD PRESSURE: 116 MMHG | HEART RATE: 68 BPM | DIASTOLIC BLOOD PRESSURE: 62 MMHG | TEMPERATURE: 97.5 F

## 2020-01-17 PROCEDURE — 99213 OFFICE O/P EST LOW 20 MIN: CPT | Performed by: FAMILY MEDICINE

## 2020-01-17 NOTE — PROGRESS NOTES
tablet, TK 1 T PO QD, Disp: 90 tablet, Rfl: 1    naproxen (NAPROSYN) 500 MG tablet, TK 1 T PO BID, Disp: 180 tablet, Rfl: 1    potassium chloride (KLOR-CON M) 20 MEQ extended release tablet, TK 1 T PO QD, Disp: 90 tablet, Rfl: 1    albuterol sulfate  (90 Base) MCG/ACT inhaler, Inhale 2 puffs into the lungs 4 times daily as needed (2 puffs), Disp: 1 Inhaler, Rfl: 3    [START ON 1/23/2020] HYDROcodone-acetaminophen (NORCO) 7.5-325 MG per tablet, Take 1 tablet by mouth 4 times daily for 30 days. , Disp: 120 tablet, Rfl: 0    Insulin Pen Needle (PEN NEEDLES) 31G X 5 MM MISC, by Does not apply route 2 times daily, Disp: , Rfl:     sulfamethoxazole-trimethoprim (BACTRIM DS) 800-160 MG per tablet, Take 1 tablet by mouth 2 times daily, Disp: , Rfl:     BYDUREON BCISE 2 MG/0.85ML AUIJ, INJECT 0.85 ML Q WEEK, Disp: , Rfl: 5    Cholecalciferol (VITAMIN D) 2000 units CAPS capsule, Take 2,000 Units by mouth, Disp: , Rfl:     aspirin 81 MG tablet, Take 81 mg by mouth daily, Disp: , Rfl:   Allergies   Allergen Reactions    Cleocin [Clindamycin Hcl]     Clindamycin/Lincomycin     Codeine     Eucerin [Albolene]     Glucophage [Metformin Hcl]     Januvia [Sitagliptin]     Lisinopril     Metformin And Related     Zithromax [Azithromycin]     Ciprofloxacin Hcl Swelling and Rash    Keflex [Cephalexin] Swelling and Rash       Past Medical History:   Diagnosis Date    DDD (degenerative disc disease), cervical     DDD (degenerative disc disease), thoracic     Diabetes mellitus ([address]     Follicular adenocarcinoma, moderately differentiated ([address]     rectum     Hyperlipidemia     Hypertension     Obesity     morbid     NICOLLE (obstructive sleep apnea)     Peripheral edema     Stasis dermatitis     Thyroid nodule     Vitamin D insufficiency      Past Surgical History:   Procedure Laterality Date    CERVICAL FUSION      COLONOSCOPY      EYE SURGERY      cataract with lol implants - [facility]U    HERNIA REPAIR umbilical     RECTAL SURGERY      transanal excision rectal cancer     SIGMOIDOSCOPY      procotoscopy      Family History   Problem Relation Age of Onset    Cancer Mother         breast     Diabetes Father     Diabetes Brother     Heart Attack Brother     Other Brother         valvular heart disease    Cancer Brother         lung      Social History     Socioeconomic History    Marital status:      Spouse name: Not on file    Number of children: Not on file    Years of education: Not on file    Highest education level: Not on file   Occupational History    Not on file   Social Needs    Financial resource strain: Not on file    Food insecurity:     Worry: Not on file     Inability: Not on file    Transportation needs:     Medical: Not on file     Non-medical: Not on file   Tobacco Use    Smoking status: Never Smoker    Smokeless tobacco: Never Used   Substance and Sexual Activity    Alcohol use: Never     Frequency: Never    Drug use: Never    Sexual activity: Not on file   Lifestyle    Physical activity:     Days per week: Not on file     Minutes per session: Not on file    Stress: Not on file   Relationships    Social connections:     Talks on phone: Not on file     Gets together: Not on file     Attends Caoda[employer] service: Not on file     Active member of club or organization: Not on file     Attends meetings of clubs or organizations: Not on file     Relationship status: Not on file    Intimate partner violence:     Fear of current or ex partner: Not on file     Emotionally abused: Not on file     Physically abused: Not on file     Forced sexual activity: Not on file   Other Topics Concern    Not on file   Social History Narrative    Not on file       Vitals:    01/17/20 1527   BP: 116/62   Pulse: 68   Temp: 97.5 °F (36.4 °C)   TempSrc: Temporal       Physical Exam   Constitutional: He is oriented to person, place, and time. He appears well-nourished.    Appears chronically

## 2020-02-05 ENCOUNTER — TELEPHONE (OUTPATIENT)
Dept: FAMILY MEDICINE CLINIC | Age: 70
End: 2020-02-05

## 2020-02-05 NOTE — TELEPHONE ENCOUNTER
Will you follow for homecare; Skilled nursing and physical therapy? He will be released from [place] [facility] today.  Dx shortness of breath, type 2 diabetes, acute diastolic heart failure and kidney disease

## 2020-02-24 RX ORDER — HYDROCODONE BITARTRATE AND ACETAMINOPHEN 7.5; 325 MG/1; MG/1
1 TABLET ORAL 4 TIMES DAILY
Qty: 120 TABLET | Refills: 0 | OUTPATIENT
Start: 2020-02-24 | End: 2020-03-25

## 2020-02-24 RX ORDER — LORAZEPAM 1 MG/1
TABLET ORAL
Qty: 180 TABLET | Refills: 0 | OUTPATIENT
Start: 2020-02-24 | End: 2024-02-24

## 2020-02-24 NOTE — TELEPHONE ENCOUNTER
Pt's wife [place] called in requesting refills for the pt's Ativan and Norco be printed out for her to come  from the office so she can get these medications filled for when the pt returns home after being released from the nursing home. Pt's wife states the pt is going to be released from the nursing home either this Wednesday or Thursday. Please advise.

## 2020-03-03 ENCOUNTER — OFFICE VISIT (OUTPATIENT)
Dept: FAMILY MEDICINE CLINIC | Age: 70
End: 2020-03-03
Payer: MEDICARE

## 2020-03-03 VITALS
HEIGHT: 70 IN | SYSTOLIC BLOOD PRESSURE: 118 MMHG | BODY MASS INDEX: 45.1 KG/M2 | TEMPERATURE: 97.6 F | HEART RATE: 72 BPM | OXYGEN SATURATION: 95 % | DIASTOLIC BLOOD PRESSURE: 70 MMHG | WEIGHT: 315 LBS

## 2020-03-03 LAB
ANION GAP SERPL CALCULATED.3IONS-SCNC: 11 MEQ/L (ref 3–11)
BUN BLDV-MCNC: 26 MG/DL
BUN BLDV-MCNC: 26 MG/DL (ref 6–20)
CALCIUM SERPL-MCNC: 9.1 MG/DL
CALCIUM SERPL-MCNC: 9.1 MG/DL (ref 8.5–10.5)
CHLORIDE BLD-SCNC: 97 MEQ/L (ref 98–107)
CHLORIDE BLD-SCNC: 97 MMOL/L
CO2: 31 MEQ/L (ref 21–31)
CO2: 31 MMOL/L
CREAT SERPL-MCNC: 1.4 MG/DL
CREAT SERPL-MCNC: 1.4 MG/DL (ref 0.6–1.3)
CREATININE + EGFR PANEL: 61 ML/MIN
GFR CALCULATED: 51
GFR NON-AFRICAN AMERICAN: 51 ML/MIN
GLUCOSE BLD-MCNC: 212 MG/DL
GLUCOSE BLD-MCNC: 212 MG/DL (ref 70–99)
POTASSIUM SERPL-SCNC: 3.1 MEQ/L (ref 3.6–5)
POTASSIUM SERPL-SCNC: 3.1 MMOL/L
SODIUM BLD-SCNC: 139 MEQ/L (ref 135–145)
SODIUM BLD-SCNC: 139 MMOL/L

## 2020-03-03 PROCEDURE — 99215 OFFICE O/P EST HI 40 MIN: CPT | Performed by: FAMILY MEDICINE

## 2020-03-03 RX ORDER — GLIMEPIRIDE 4 MG/1
TABLET ORAL
Qty: 90 TABLET | Refills: 1 | Status: SHIPPED
Start: 2020-03-03 | End: 2020-05-28 | Stop reason: SDUPTHER

## 2020-03-03 RX ORDER — ATORVASTATIN CALCIUM 20 MG/1
TABLET, FILM COATED ORAL
Qty: 90 TABLET | Refills: 1 | Status: SHIPPED
Start: 2020-03-03 | End: 2020-05-28 | Stop reason: SDUPTHER

## 2020-03-03 RX ORDER — PNV NO.95/FERROUS FUM/FOLIC AC 28MG-0.8MG
TABLET ORAL
COMMUNITY
Start: 2020-02-26 | End: 2020-03-03 | Stop reason: SDUPTHER

## 2020-03-03 RX ORDER — GLIMEPIRIDE 4 MG/1
TABLET ORAL
COMMUNITY
Start: 2020-01-18 | End: 2020-03-03

## 2020-03-03 RX ORDER — PNV NO.95/FERROUS FUM/FOLIC AC 28MG-0.8MG
325 TABLET ORAL EVERY OTHER DAY
Qty: 30 TABLET | Refills: 3 | Status: SHIPPED
Start: 2020-03-03 | End: 2020-05-19 | Stop reason: SDUPTHER

## 2020-03-03 RX ORDER — METOPROLOL TARTRATE 50 MG/1
TABLET, FILM COATED ORAL
Qty: 180 TABLET | Refills: 1 | Status: SHIPPED
Start: 2020-03-03 | End: 2020-05-28 | Stop reason: SDUPTHER

## 2020-03-03 RX ORDER — METOPROLOL TARTRATE 50 MG/1
TABLET, FILM COATED ORAL
COMMUNITY
Start: 2020-02-26 | End: 2020-03-03 | Stop reason: SDUPTHER

## 2020-03-03 RX ORDER — POTASSIUM CHLORIDE 1500 MG/1
TABLET, FILM COATED, EXTENDED RELEASE ORAL
COMMUNITY
Start: 2020-01-18 | End: 2020-03-03 | Stop reason: SDUPTHER

## 2020-03-03 RX ORDER — BUMETANIDE 1 MG/1
TABLET ORAL
Qty: 180 TABLET | Refills: 1 | Status: SHIPPED
Start: 2020-03-03 | End: 2020-05-28 | Stop reason: SDUPTHER

## 2020-03-03 RX ORDER — POTASSIUM CHLORIDE 1500 MG/1
TABLET, FILM COATED, EXTENDED RELEASE ORAL
Qty: 90 TABLET | Refills: 1 | Status: SHIPPED
Start: 2020-03-03 | End: 2020-04-02 | Stop reason: SDUPTHER

## 2020-03-03 RX ORDER — PANTOPRAZOLE SODIUM 40 MG/1
TABLET, DELAYED RELEASE ORAL
COMMUNITY
Start: 2020-02-26 | End: 2020-03-03 | Stop reason: SDUPTHER

## 2020-03-03 RX ORDER — LORAZEPAM 1 MG/1
TABLET ORAL
Qty: 60 TABLET | Refills: 0 | Status: SHIPPED
Start: 2020-03-03 | End: 2020-04-02 | Stop reason: SDUPTHER

## 2020-03-03 RX ORDER — BUMETANIDE 1 MG/1
TABLET ORAL
COMMUNITY
Start: 2020-02-26 | End: 2020-03-03 | Stop reason: SDUPTHER

## 2020-03-03 RX ORDER — HYDROCODONE BITARTRATE AND ACETAMINOPHEN 7.5; 325 MG/1; MG/1
1 TABLET ORAL 4 TIMES DAILY
Qty: 120 TABLET | Refills: 0 | Status: SHIPPED
Start: 2020-03-03 | End: 2020-04-02 | Stop reason: SDUPTHER

## 2020-03-03 RX ORDER — PIOGLITAZONEHYDROCHLORIDE 45 MG/1
TABLET ORAL
Qty: 90 TABLET | Refills: 1 | Status: SHIPPED
Start: 2020-03-03 | End: 2020-05-28

## 2020-03-03 RX ORDER — METOLAZONE 2.5 MG/1
TABLET ORAL
COMMUNITY
Start: 2020-02-26 | End: 2020-03-03 | Stop reason: SDUPTHER

## 2020-03-03 RX ORDER — PANTOPRAZOLE SODIUM 40 MG/1
TABLET, DELAYED RELEASE ORAL
Qty: 180 TABLET | Refills: 1 | Status: SHIPPED
Start: 2020-03-03 | End: 2020-03-06 | Stop reason: SDUPTHER

## 2020-03-03 RX ORDER — LOSARTAN POTASSIUM 50 MG/1
TABLET ORAL
Qty: 90 TABLET | Refills: 1 | Status: SHIPPED
Start: 2020-03-03 | End: 2020-05-28 | Stop reason: SDUPTHER

## 2020-03-03 RX ORDER — METOLAZONE 2.5 MG/1
TABLET ORAL
Qty: 30 TABLET | Refills: 1 | Status: SHIPPED
Start: 2020-03-03 | End: 2020-04-30 | Stop reason: SDUPTHER

## 2020-03-03 RX ORDER — ATORVASTATIN CALCIUM 20 MG/1
TABLET, FILM COATED ORAL
COMMUNITY
Start: 2020-01-18 | End: 2020-03-03 | Stop reason: ALTCHOICE

## 2020-03-03 RX ORDER — LOSARTAN POTASSIUM 50 MG/1
TABLET ORAL
COMMUNITY
Start: 2020-01-18 | End: 2020-03-03

## 2020-03-03 RX ORDER — MULTIVIT WITH MINERALS/LUTEIN
250 TABLET ORAL DAILY
COMMUNITY

## 2020-03-03 RX ORDER — FLUTICASONE PROPIONATE 50 MCG
1 SPRAY, SUSPENSION (ML) NASAL DAILY
COMMUNITY
End: 2020-05-28 | Stop reason: SDUPTHER

## 2020-03-04 ASSESSMENT — ENCOUNTER SYMPTOMS
BACK PAIN: 0
CONSTIPATION: 0
ALLERGIC/IMMUNOLOGIC NEGATIVE: 1
ABDOMINAL PAIN: 0
NAUSEA: 0
SORE THROAT: 0
COLOR CHANGE: 0
CHEST TIGHTNESS: 0
EYE DISCHARGE: 0
DIARRHEA: 0
EYE PAIN: 0
VOMITING: 0
SINUS PAIN: 0
SHORTNESS OF BREATH: 1
TROUBLE SWALLOWING: 0
WHEEZING: 0
COUGH: 0

## 2020-03-04 NOTE — PROGRESS NOTES
3/4/20  Shahida Cardenas : 1950 Sex: male  Age: 81 y.o. Chief Complaint   Patient presents with    Follow-Up from Hospital     discharged from [place]       HPI:  81 y.o. male here for transition into care. Was seen by other physicians. In [facility] with chf/bleed/etc, discharged to SNF. Readmitted to [facility] and then sent to [facility]. Sent home. Here for follow up. Has lost 85 lbs. Review of Systems   Constitutional: Negative for chills, diaphoresis and fever. HENT: Negative for congestion, ear pain, sinus pain, sneezing, sore throat, tinnitus and trouble swallowing. Eyes: Negative for pain, discharge and visual disturbance. Respiratory: Positive for shortness of breath. Negative for cough, chest tightness and wheezing. Cardiovascular: Negative for chest pain and palpitations. Gastrointestinal: Negative for abdominal pain, constipation, diarrhea, nausea and vomiting. Endocrine: Negative for polydipsia and polyuria. Genitourinary: Negative for difficulty urinating, flank pain and frequency. Musculoskeletal: Negative for arthralgias, back pain, joint swelling and myalgias. Skin: Negative for color change and rash. Allergic/Immunologic: Negative. Neurological: Positive for weakness. Negative for dizziness, tremors, seizures, syncope and light-headedness. Hematological: Negative for adenopathy. Psychiatric/Behavioral: Negative for behavioral problems and hallucinations. The patient is not nervous/anxious. Current Outpatient Medications:     fluticasone (FLONASE) 50 MCG/ACT nasal spray, 1 spray by Each Nostril route daily, Disp: , Rfl:     Ascorbic Acid (VITAMIN C) 250 MG tablet, Take 250 mg by mouth daily, Disp: , Rfl:     insulin aspart (NOVOLOG FLEXPEN) 100 UNIT/ML injection pen, Inject into the skin 3 times daily (before meals), Disp: , Rfl:     HYDROcodone-acetaminophen (NORCO) 7.5-325 MG per tablet, Take 1 tablet by mouth 4 times daily for 30 days. , Disp: 120 tablet, Rfl: 0    LORazepam (ATIVAN) 1 MG tablet, TK 1 T PO BID, Disp: 60 tablet, Rfl: 0    glimepiride (AMARYL) 4 MG tablet, TK 1 qam with MEALS, Disp: 90 tablet, Rfl: 1    atorvastatin (LIPITOR) 20 MG tablet, TK 1 T PO Q NIGHT, Disp: 90 tablet, Rfl: 1    losartan (COZAAR) 50 MG tablet, TK 1 T PO QD, Disp: 90 tablet, Rfl: 1    pioglitazone (ACTOS) 45 MG tablet, TK 1 T PO QD, Disp: 90 tablet, Rfl: 1    potassium chloride (K-TAB) 20 MEQ TBCR extended release tablet, Daily, Disp: 90 tablet, Rfl: 1    pantoprazole (PROTONIX) 40 MG tablet, TK 1 T PO BID, Disp: 180 tablet, Rfl: 1    bumetanide (BUMEX) 1 MG tablet, TK 1  T PO BID, Disp: 180 tablet, Rfl: 1    metoprolol tartrate (LOPRESSOR) 50 MG tablet, TK 1  T PO BID, Disp: 180 tablet, Rfl: 1    metOLazone (ZAROXOLYN) 2.5 MG tablet, One Monday/Wednesday/Friday, Disp: 30 tablet, Rfl: 1    Ferrous Sulfate (IRON) 325 (65 Fe) MG TABS, Take 325 mg by mouth every other day, Disp: 30 tablet, Rfl: 3    albuterol sulfate  (90 Base) MCG/ACT inhaler, Inhale 2 puffs into the lungs 4 times daily as needed (2 puffs), Disp: 1 Inhaler, Rfl: 3    Insulin Pen Needle (PEN NEEDLES) 31G X 5 MM MISC, by Does not apply route 2 times daily, Disp: , Rfl:     BYDUREON BCISE 2 MG/0.85ML AUIJ, INJECT 0.85 ML Q WEEK, Disp: , Rfl: 5    Cholecalciferol (VITAMIN D) 2000 units CAPS capsule, Take 2,000 Units by mouth, Disp: , Rfl:   Allergies   Allergen Reactions    Cleocin [Clindamycin Hcl]     Clindamycin/Lincomycin     Codeine     Eucerin [Albolene]     Glucophage [Metformin Hcl]     Januvia [Sitagliptin]     Lisinopril     Metformin And Related     Zithromax [Azithromycin]     Ciprofloxacin Hcl Swelling and Rash    Keflex [Cephalexin] Swelling and Rash       Past Medical History:   Diagnosis Date    DDD (degenerative disc disease), cervical     DDD (degenerative disc disease), thoracic     Diabetes mellitus (HCC)     [facility]licular [facility] moderately differentiated [facility]     rectum     Hyperlipidemia     Hypertension     Obesity     morbid     NICOLLE (obstructive sleep apnea)     Peripheral edema     Stasis dermatitis     Thyroid nodule     Vitamin D insufficiency      Past Surgical History:   Procedure Laterality Date    CERVICAL FUSION      COLONOSCOPY      EYE SURGERY      cataract with lol implants - [facility]U    HERNIA REPAIR      umbilical     RECTAL SURGERY      transanal excision rectal cancer     SIGMOIDOSCOPY      procotoscopy      Family History   Problem Relation Age of Onset    Cancer Mother         breast     Diabetes Father     Diabetes Brother     Heart Attack Brother     Other Brother         valvular heart disease    Cancer Brother         lung      Social History     Socioeconomic History    Marital status:      Spouse name: Not on file    Number of children: Not on file    Years of education: Not on file    Highest education level: Not on file   Occupational History    Not on file   Social Needs    Financial resource strain: Not on file    Food insecurity:     Worry: Not on file     Inability: Not on file    Transportation needs:     Medical: Not on file     Non-medical: Not on file   Tobacco Use    Smoking status: Never Smoker    Smokeless tobacco: Never Used   Substance and Sexual Activity    Alcohol use: Never     Frequency: Never    Drug use: Never    Sexual activity: Not on file   Lifestyle    Physical activity:     Days per week: Not on file     Minutes per session: Not on file    Stress: Not on file   Relationships    Social connections:     Talks on phone: Not on file     Gets together: Not on file     Attends [employer] service: Not on file     Active member of club or organization: Not on file     Attends meetings of clubs or organizations: Not on file     Relationship status: Not on file    Intimate partner violence:     Fear of current or ex partner: Not on file     Emotionally abused: Not on file     Physically abused: Not on file     Forced sexual activity: Not on file   Other Topics Concern    Not on file   Social History Narrative    Not on file       Vitals:    03/03/20 1639   BP: 118/70   Pulse: 72   Temp: 97.6 °F (36.4 °C)   TempSrc: Temporal   SpO2: 95%   Weight: (!) 396 lb (179.6 kg)   Height: 5' 10\" (1.778 m)       Physical Exam  Vitals signs reviewed. Constitutional:       Appearance: He is well-developed. HENT:      Head: Normocephalic. Right Ear: Tympanic membrane normal.      Left Ear: Tympanic membrane normal.      Nose: Nose normal.      Mouth/Throat:      Mouth: Mucous membranes are moist.      Pharynx: Oropharynx is clear. Eyes:      Extraocular Movements: Extraocular movements intact. Conjunctiva/sclera: Conjunctivae normal.      Pupils: Pupils are equal, round, and reactive to light. Neck:      Musculoskeletal: Neck supple. Vascular: No carotid bruit. Cardiovascular:      Rate and Rhythm: Normal rate and regular rhythm. Heart sounds: No murmur. Gallop present. Pulmonary:      Effort: Pulmonary effort is normal.      Breath sounds: Normal breath sounds. Abdominal:      General: Bowel sounds are normal.      Palpations: Abdomen is soft. Musculoskeletal:      Right lower leg: Edema present. Left lower leg: Edema present. Comments: Manual wheelchair. Skin:     General: Skin is warm and dry. Neurological:      Mental Status: He is alert and oriented to person, place, and time. Psychiatric:         Mood and Affect: Mood normal.         POCT Orders   No Active POCT       Assessment and Plan:  Kvng Miranda was seen today for follow-up from hospital.    Diagnoses and all orders for this visit:    Combined systolic and diastolic congestive heart failure, unspecified HF chronicity (HCC)    Lumbar disc disease with radiculopathy  -     HYDROcodone-acetaminophen (NORCO) 7.5-325 MG per tablet; Take 1 tablet by mouth 4 times daily for 30 days.     Anxiety  -     LORazepam

## 2020-03-05 ENCOUNTER — TELEPHONE (OUTPATIENT)
Dept: FAMILY MEDICINE CLINIC | Age: 70
End: 2020-03-05

## 2020-03-06 RX ORDER — PANTOPRAZOLE SODIUM 40 MG/1
TABLET, DELAYED RELEASE ORAL
Qty: 90 TABLET | Refills: 1 | Status: SHIPPED
Start: 2020-03-06 | End: 2020-05-28 | Stop reason: SDUPTHER

## 2020-03-12 ENCOUNTER — TELEPHONE (OUTPATIENT)
Dept: FAMILY MEDICINE CLINIC | Age: 70
End: 2020-03-12

## 2020-03-12 NOTE — TELEPHONE ENCOUNTER
Kye Patient a nurse with [facility] calling; around 6:00pm last night patients family called her. He had low blood pressure reading but felt fine. About 15 min before they called her they took bp 4 times with a wrist cuff; 79/42, 82/42, 74/50 and 97/47. They checked it again while speaking with her 97/62. They refused going to the ED. She wanted to make you aware.

## 2020-03-16 ENCOUNTER — TELEPHONE (OUTPATIENT)
Dept: ADMINISTRATIVE | Age: 70
End: 2020-03-16

## 2020-03-16 NOTE — TELEPHONE ENCOUNTER
Pt wants to cancel appt d/t concern about being in office will ill people. His appt is tomorrow. He also wants Dr. Guadalupe Dunaway guidance if this will be a problem with anything he is monitoring him for. Please call pt's wife to discuss.

## 2020-04-01 ENCOUNTER — TELEPHONE (OUTPATIENT)
Dept: ADMINISTRATIVE | Age: 70
End: 2020-04-01

## 2020-04-02 ENCOUNTER — VIRTUAL VISIT (OUTPATIENT)
Dept: PRIMARY CARE CLINIC | Age: 70
End: 2020-04-02
Payer: MEDICARE

## 2020-04-02 VITALS
HEIGHT: 70 IN | DIASTOLIC BLOOD PRESSURE: 79 MMHG | TEMPERATURE: 96.6 F | WEIGHT: 315 LBS | BODY MASS INDEX: 45.1 KG/M2 | OXYGEN SATURATION: 96 % | SYSTOLIC BLOOD PRESSURE: 121 MMHG | HEART RATE: 74 BPM

## 2020-04-02 PROCEDURE — 99213 OFFICE O/P EST LOW 20 MIN: CPT | Performed by: FAMILY MEDICINE

## 2020-04-02 RX ORDER — POTASSIUM CHLORIDE 1500 MG/1
TABLET, FILM COATED, EXTENDED RELEASE ORAL
Qty: 90 TABLET | Refills: 1 | Status: SHIPPED
Start: 2020-04-02 | End: 2020-04-30 | Stop reason: SDUPTHER

## 2020-04-02 RX ORDER — LORAZEPAM 1 MG/1
TABLET ORAL
Qty: 60 TABLET | Refills: 0 | Status: SHIPPED
Start: 2020-04-02 | End: 2020-04-30 | Stop reason: SDUPTHER

## 2020-04-02 RX ORDER — HYDROCODONE BITARTRATE AND ACETAMINOPHEN 7.5; 325 MG/1; MG/1
1 TABLET ORAL 4 TIMES DAILY
Qty: 120 TABLET | Refills: 0 | Status: SHIPPED
Start: 2020-04-02 | End: 2020-04-30 | Stop reason: SDUPTHER

## 2020-04-02 ASSESSMENT — ENCOUNTER SYMPTOMS
BLOOD IN STOOL: 0
SORE THROAT: 0
PHOTOPHOBIA: 0
EYE DISCHARGE: 0
CHEST TIGHTNESS: 0
SHORTNESS OF BREATH: 0
COUGH: 0
NAUSEA: 0
SINUS PAIN: 0
TROUBLE SWALLOWING: 0
DIARRHEA: 0
ABDOMINAL PAIN: 0
EYE PAIN: 0
EYE REDNESS: 0
BACK PAIN: 1
ALLERGIC/IMMUNOLOGIC NEGATIVE: 1
VOMITING: 0

## 2020-04-02 ASSESSMENT — PATIENT HEALTH QUESTIONNAIRE - PHQ9
SUM OF ALL RESPONSES TO PHQ QUESTIONS 1-9: 0
SUM OF ALL RESPONSES TO PHQ QUESTIONS 1-9: 0
SUM OF ALL RESPONSES TO PHQ9 QUESTIONS 1 & 2: 0
2. FEELING DOWN, DEPRESSED OR HOPELESS: 0
1. LITTLE INTEREST OR PLEASURE IN DOING THINGS: 0
SUM OF ALL RESPONSES TO PHQ QUESTIONS 1-9: 0
SUM OF ALL RESPONSES TO PHQ9 QUESTIONS 1 & 2: 0
1. LITTLE INTEREST OR PLEASURE IN DOING THINGS: 0
2. FEELING DOWN, DEPRESSED OR HOPELESS: 0
SUM OF ALL RESPONSES TO PHQ QUESTIONS 1-9: 0

## 2020-04-02 NOTE — PROGRESS NOTES
Provider, MD   insulin aspart (NOVOLOG FLEXPEN) 100 UNIT/ML injection pen Inject into the skin 3 times daily (before meals) Yes Historical Provider, MD   glimepiride (AMARYL) 4 MG tablet TK 1 qam with MEALS Yes Israel Marquez DO   atorvastatin (LIPITOR) 20 MG tablet TK 1 T PO Q NIGHT Yes Donny Marquez DO   losartan (COZAAR) 50 MG tablet TK 1 T PO QD Yes Donny Marquez DO   pioglitazone (ACTOS) 45 MG tablet TK 1 T PO QD Yes Donny Marquez, DO   bumetanide (BUMEX) 1 MG tablet TK 1  T PO BID Yes Israel Marquez, DO   metoprolol tartrate (LOPRESSOR) 50 MG tablet TK 1  T PO BID Yes Donny Marquez DO   metOLazone (ZAROXOLYN) 2.5 MG tablet One Monday/Wednesday/Friday Yes Israel Marquez DO   Ferrous Sulfate (IRON) 325 (65 Fe) MG TABS Take 325 mg by mouth every other day Yes Israel Marquez DO   albuterol sulfate  (90 Base) MCG/ACT inhaler Inhale 2 puffs into the lungs 4 times daily as needed (2 puffs) Yes Donny Marquez DO   Insulin Pen Needle (PEN NEEDLES) 31G X 5 MM MISC by Does not apply route 2 times daily Yes Historical Provider, MD MENA BCISE 2 MG/0.85ML AUIJ INJECT 0.85 ML Q WEEK Yes Historical Provider, MD   Cholecalciferol (VITAMIN D) 2000 units CAPS capsule Take 2,000 Units by mouth Yes Historical Provider, MD   fluticasone (FLONASE) 50 MCG/ACT nasal spray 1 spray by Each Nostril route daily  Historical Provider, MD       Social History     Tobacco Use    Smoking status: Never Smoker    Smokeless tobacco: Never Used   Substance Use Topics    Alcohol use: Never     Frequency: Never    Drug use: Never            PHYSICAL EXAMINATION:  [ INSTRUCTIONS:  \"[x]\" Indicates a positive item  \"[]\" Indicates a negative item  -- DELETE ALL ITEMS NOT EXAMINED]  Vital Signs: (As obtained by patient/caregiver or practitioner observation)     Pulse oximetry- 95%4 L    Constitutional: [x] Appears well-developed and well-nourished [x] No apparent distress      [] Abnormal-   Mental status  [x] Alert and awake need for these refills  I believe the need for refill is warranted at this time. I have reviewed the [place] report and have found no suspicion of drug seeking behavior. I have discussed the side effects and narcotic policy with this patient ad the patient has demonstrated understanding. A follow up appointment will be scheduled with me. Return in about 2 weeks (around 4/16/2020). Barbara Rosas is a 81 y.o. male being evaluated by a Virtual Visit (video visit) encounter to address concerns as mentioned above. A caregiver was present when appropriate. Due to this being a TeleHealth encounter ([facility]), evaluation of the following organ systems was limited: Vitals/Constitutional/EENT/Resp/CV/GI//MS/Neuro/Skin/Heme-Lymph-Imm. Pursuant to the emergency declaration under the [address] [place] [address] authority and the [employer] and [employer] Act, this Virtual Visit was conducted with patient's (and/or legal guardian's) consent, to reduce the patient's risk of exposure to COVID-19 and provide necessary medical care. The patient (and/or legal guardian) has also been advised to contact this office for worsening conditions or problems, and seek emergency medical treatment and/or call 911 if deemed necessary. Services were provided through a video synchronous discussion virtually to substitute for in-person clinic visit. Patient and provider were located at their individual homes. --Olga Mueller DO on 4/2/2020 at 3:52 PM    An electronic signature was used to authenticate this note.

## 2020-04-03 ENCOUNTER — TELEPHONE (OUTPATIENT)
Dept: PRIMARY CARE CLINIC | Age: 70
End: 2020-04-03

## 2020-04-06 NOTE — TELEPHONE ENCOUNTER
Pt's wife calling in for a script for pt's glucose monitoring supplies. Last Appointment:  3/3/2020  No future appointments.

## 2020-04-07 NOTE — TELEPHONE ENCOUNTER
Estela Caceres back she said the acute Lumbar strain was already used and needs a new diagnoses code to use for the pt. Please advise.

## 2020-04-07 NOTE — TELEPHONE ENCOUNTER
Ok the last I seen was that is was routed back to [facility], When I spoke to [place] rigo she said she told Lefty Caldwell that the Acute Lumbar strain would not work for the new Diagnosis. I apologize.

## 2020-04-13 ENCOUNTER — TELEPHONE (OUTPATIENT)
Dept: FAMILY MEDICINE CLINIC | Age: 70
End: 2020-04-13

## 2020-04-16 ENCOUNTER — TELEPHONE (OUTPATIENT)
Dept: PRIMARY CARE CLINIC | Age: 70
End: 2020-04-16

## 2020-04-16 NOTE — TELEPHONE ENCOUNTER
Gina Friends from the [facility] calling; will you give verbal ok for pass port services?  If they will need a primary dx, secondary dx and diet restrictions

## 2020-04-27 ENCOUNTER — TELEPHONE (OUTPATIENT)
Dept: PRIMARY CARE CLINIC | Age: 70
End: 2020-04-27

## 2020-04-27 ENCOUNTER — TELEPHONE (OUTPATIENT)
Dept: FAMILY MEDICINE CLINIC | Age: 70
End: 2020-04-27

## 2020-04-27 NOTE — TELEPHONE ENCOUNTER
Sent today office visits and demographics to [address] to try and get patient home health. They will review and call me tomorrow.

## 2020-04-27 NOTE — TELEPHONE ENCOUNTER
Patients wife calling to check on status of Home PT I called and spoke with Judah Khan at [facility] she received the new orders for PT on April 7th the problem is unless there is a new Dx such as a fall or something all Dx's have been used so they are unable to go back to do PT till after 60 days of discharge. Patients wife thought a Home Health nurse would be coming to the house to draw blood but I don't think there are any orders in for Home Health Nurse. The patient scraped his leg while transferring from [facility] to wheel chair and now has an abrasion that wife is treating with antibiotic cream but she says its swollen and a little gooey maybe infected. They recently got a scale and pt has gained 7 lbs in the past week. Also pt has been having a lot of anxiety so wife if giving him two pills per day instead of one so she only has 3 days left and can't refill till May 11th.

## 2020-04-27 NOTE — TELEPHONE ENCOUNTER
Gave them verbal order for patient . 1) open wound care  2 abnormal weight gain. They will contact patient and get out as soon as possible. Idalia Kanner will send paperwork asking for what level of care patint needs gave them [employer] fax # .

## 2020-04-28 NOTE — TELEPHONE ENCOUNTER
Left message that they must contact home health agency to try and get this sorted out. Unable to get anyone out there while he is under contract. Office # left.

## 2020-04-29 ENCOUNTER — TELEPHONE (OUTPATIENT)
Dept: FAMILY MEDICINE CLINIC | Age: 70
End: 2020-04-29

## 2020-04-30 ENCOUNTER — VIRTUAL VISIT (OUTPATIENT)
Dept: PRIMARY CARE CLINIC | Age: 70
End: 2020-04-30
Payer: MEDICARE

## 2020-04-30 VITALS
DIASTOLIC BLOOD PRESSURE: 87 MMHG | OXYGEN SATURATION: 90 % | SYSTOLIC BLOOD PRESSURE: 135 MMHG | BODY MASS INDEX: 45.1 KG/M2 | HEIGHT: 70 IN | HEART RATE: 73 BPM | TEMPERATURE: 96.5 F | WEIGHT: 315 LBS

## 2020-04-30 PROBLEM — S80.219A ABRASION OF KNEE: Status: ACTIVE | Noted: 2020-04-30

## 2020-04-30 PROBLEM — E11.65 TYPE 2 DIABETES MELLITUS WITH HYPERGLYCEMIA, WITHOUT LONG-TERM CURRENT USE OF INSULIN (HCC): Status: ACTIVE | Noted: 2020-04-30

## 2020-04-30 PROBLEM — F06.4 ANXIETY DISORDER DUE TO MEDICAL CONDITION: Status: ACTIVE | Noted: 2020-04-30

## 2020-04-30 PROBLEM — I50.23 ACUTE ON CHRONIC SYSTOLIC CONGESTIVE HEART FAILURE (HCC): Status: ACTIVE | Noted: 2020-04-30

## 2020-04-30 PROBLEM — M51.16 LUMBAR DISC DISEASE WITH RADICULOPATHY: Status: ACTIVE | Noted: 2020-04-30

## 2020-04-30 PROBLEM — R60.0 LOCALIZED EDEMA: Status: ACTIVE | Noted: 2020-04-30

## 2020-04-30 PROBLEM — F41.9 ANXIETY: Status: ACTIVE | Noted: 2020-04-30

## 2020-04-30 PROCEDURE — 99214 OFFICE O/P EST MOD 30 MIN: CPT | Performed by: FAMILY MEDICINE

## 2020-04-30 RX ORDER — SULFAMETHOXAZOLE AND TRIMETHOPRIM 800; 160 MG/1; MG/1
1 TABLET ORAL 2 TIMES DAILY
Qty: 14 TABLET | Refills: 0 | Status: SHIPPED
Start: 2020-04-30 | End: 2020-06-25 | Stop reason: SDUPTHER

## 2020-04-30 RX ORDER — POTASSIUM CHLORIDE 1500 MG/1
TABLET, FILM COATED, EXTENDED RELEASE ORAL
Qty: 180 TABLET | Refills: 1 | Status: SHIPPED
Start: 2020-04-30 | End: 2020-05-28 | Stop reason: SDUPTHER

## 2020-04-30 RX ORDER — LORAZEPAM 1 MG/1
TABLET ORAL
Qty: 90 TABLET | Refills: 2 | Status: SHIPPED
Start: 2020-04-30 | End: 2020-05-28 | Stop reason: SDUPTHER

## 2020-04-30 RX ORDER — ALBUTEROL SULFATE 90 UG/1
2 AEROSOL, METERED RESPIRATORY (INHALATION) 4 TIMES DAILY PRN
Qty: 1 INHALER | Refills: 3 | Status: SHIPPED
Start: 2020-04-30 | End: 2020-05-28 | Stop reason: SDUPTHER

## 2020-04-30 RX ORDER — HYDROCODONE BITARTRATE AND ACETAMINOPHEN 7.5; 325 MG/1; MG/1
1 TABLET ORAL 4 TIMES DAILY
Qty: 120 TABLET | Refills: 0 | Status: SHIPPED
Start: 2020-04-30 | End: 2020-05-28 | Stop reason: SDUPTHER

## 2020-04-30 RX ORDER — METOLAZONE 2.5 MG/1
TABLET ORAL
Qty: 30 TABLET | Refills: 2 | Status: SHIPPED
Start: 2020-04-30 | End: 2020-05-28 | Stop reason: SDUPTHER

## 2020-04-30 ASSESSMENT — ENCOUNTER SYMPTOMS
ALLERGIC/IMMUNOLOGIC NEGATIVE: 1
EYE DISCHARGE: 0
SORE THROAT: 0
PHOTOPHOBIA: 0
WHEEZING: 1
SINUS PAIN: 0
COLOR CHANGE: 1
VOMITING: 0
ABDOMINAL PAIN: 0
CHEST TIGHTNESS: 0
EYE REDNESS: 0
COUGH: 0
BLOOD IN STOOL: 0
DIARRHEA: 0
EYE PAIN: 0
TROUBLE SWALLOWING: 0
SHORTNESS OF BREATH: 1
NAUSEA: 0
BACK PAIN: 1

## 2020-04-30 NOTE — PROGRESS NOTES
LORazepam (ATIVAN) 1 MG tablet TK 1 T PO TID Yes Paul Smiths L Amezcua, DO   potassium chloride (K-TAB) 20 MEQ TBCR extended release tablet 1 tablet twice daily Yes Paul Smiths L Amezcua, DO   metOLazone (ZAROXOLYN) 2.5 MG tablet One Monday/Wednesday/Friday Yes Paul Smiths L Amezcua, DO   albuterol sulfate  (90 Base) MCG/ACT inhaler Inhale 2 puffs into the lungs 4 times daily as needed (2 puffs) Yes Paul Smiths L Amezcua, DO   sulfamethoxazole-trimethoprim (BACTRIM DS;SEPTRA DS) 800-160 MG per tablet Take 1 tablet by mouth 2 times daily for 7 days Yes Paul Smiths L Amezcua, DO   blood glucose monitor supplies Alcohol Swabs, Lancets,  Test strips.  Test BID Yes Paul Smiths L Seven, DO   pantoprazole (PROTONIX) 40 MG tablet TK 1 T PO qam Yes Donny Marquez,    fluticasone (FLONASE) 50 MCG/ACT nasal spray 1 spray by Each Nostril route daily Yes Historical Provider, MD   Ascorbic Acid (VITAMIN C) 250 MG tablet Take 250 mg by mouth daily Yes Historical Provider, MD   insulin aspart (NOVOLOG FLEXPEN) 100 UNIT/ML injection pen Inject into the skin 3 times daily (before meals) Yes Historical Provider, MD   glimepiride (AMARYL) 4 MG tablet TK 1 qam with MEALS Yes Fred Marquez DO   atorvastatin (LIPITOR) 20 MG tablet TK 1 T PO Q NIGHT Yes Donny Marquez DO   losartan (COZAAR) 50 MG tablet TK 1 T PO QD Yes Donny Marquez DO   pioglitazone (ACTOS) 45 MG tablet TK 1 T PO QD Yes Donny Marquez DO   bumetanide (BUMEX) 1 MG tablet TK 1  T PO BID Yes Donny Marquez DO   metoprolol tartrate (LOPRESSOR) 50 MG tablet TK 1  T PO BID Yes Donny Marquez DO   Ferrous Sulfate (IRON) 325 (65 Fe) MG TABS Take 325 mg by mouth every other day Yes Fred Marquez DO   Insulin Pen Needle (PEN NEEDLES) 31G X 5 MM MISC by Does not apply route 2 times daily Yes Historical Provider, MD MENA BCISE 2 MG/0.85ML AUIJ INJECT 0.85 ML Q WEEK Yes Historical Provider, MD   Cholecalciferol (VITAMIN D) 2000 units CAPS capsule Take 2,000 Units by mouth Yes Historical

## 2020-05-19 RX ORDER — PNV NO.95/FERROUS FUM/FOLIC AC 28MG-0.8MG
325 TABLET ORAL EVERY OTHER DAY
Qty: 30 TABLET | Refills: 2 | Status: SHIPPED
Start: 2020-05-19 | End: 2020-08-04 | Stop reason: SDUPTHER

## 2020-05-19 RX ORDER — GLUCOSAMINE HCL/CHONDROITIN SU 500-400 MG
CAPSULE ORAL
Qty: 200 STRIP | Refills: 11 | Status: SHIPPED
Start: 2020-05-19 | End: 2020-05-28 | Stop reason: SDUPTHER

## 2020-05-28 ENCOUNTER — VIRTUAL VISIT (OUTPATIENT)
Dept: PRIMARY CARE CLINIC | Age: 70
End: 2020-05-28
Payer: MEDICARE

## 2020-05-28 VITALS
HEIGHT: 71 IN | WEIGHT: 315 LBS | HEART RATE: 89 BPM | DIASTOLIC BLOOD PRESSURE: 88 MMHG | SYSTOLIC BLOOD PRESSURE: 132 MMHG | BODY MASS INDEX: 44.1 KG/M2 | TEMPERATURE: 96.9 F | OXYGEN SATURATION: 94 %

## 2020-05-28 PROBLEM — Z79.01 LONG TERM CURRENT USE OF ANTICOAGULANT THERAPY: Status: ACTIVE | Noted: 2020-05-28

## 2020-05-28 PROBLEM — I48.91 ATRIAL FIBRILLATION WITH CONTROLLED VENTRICULAR RESPONSE (HCC): Status: ACTIVE | Noted: 2020-05-28

## 2020-05-28 PROBLEM — I50.30 CONGESTIVE HEART FAILURE WITH LEFT VENTRICULAR DIASTOLIC DYSFUNCTION (HCC): Status: ACTIVE | Noted: 2020-05-28

## 2020-05-28 PROBLEM — I50.23 ACUTE ON CHRONIC SYSTOLIC CONGESTIVE HEART FAILURE (HCC): Status: RESOLVED | Noted: 2020-04-30 | Resolved: 2020-05-28

## 2020-05-28 PROBLEM — R60.0 LOCALIZED EDEMA: Status: RESOLVED | Noted: 2020-04-30 | Resolved: 2020-05-28

## 2020-05-28 PROBLEM — S80.219A ABRASION OF KNEE: Status: RESOLVED | Noted: 2020-04-30 | Resolved: 2020-05-28

## 2020-05-28 PROBLEM — F41.9 ANXIETY: Status: RESOLVED | Noted: 2020-04-30 | Resolved: 2020-05-28

## 2020-05-28 PROBLEM — Z85.048 HISTORY OF MALIGNANT NEOPLASM OF RECTUM: Status: ACTIVE | Noted: 2020-05-28

## 2020-05-28 PROCEDURE — 99215 OFFICE O/P EST HI 40 MIN: CPT | Performed by: FAMILY MEDICINE

## 2020-05-28 RX ORDER — METOPROLOL TARTRATE 50 MG/1
TABLET, FILM COATED ORAL
Qty: 180 TABLET | Refills: 1 | Status: SHIPPED
Start: 2020-05-28 | End: 2020-08-04

## 2020-05-28 RX ORDER — HYDROCODONE BITARTRATE AND ACETAMINOPHEN 7.5; 325 MG/1; MG/1
1 TABLET ORAL 4 TIMES DAILY
Qty: 120 TABLET | Refills: 0 | Status: SHIPPED
Start: 2020-05-28 | End: 2020-06-25 | Stop reason: SDUPTHER

## 2020-05-28 RX ORDER — INSULIN ASPART 100 [IU]/ML
5 INJECTION, SOLUTION INTRAVENOUS; SUBCUTANEOUS
Qty: 5 PEN | Refills: 0 | Status: SHIPPED
Start: 2020-05-28 | End: 2020-06-02

## 2020-05-28 RX ORDER — LORAZEPAM 1 MG/1
TABLET ORAL
Qty: 90 TABLET | Refills: 2 | Status: SHIPPED
Start: 2020-05-28 | End: 2020-06-25 | Stop reason: SDUPTHER

## 2020-05-28 RX ORDER — ATORVASTATIN CALCIUM 20 MG/1
TABLET, FILM COATED ORAL
Qty: 90 TABLET | Refills: 1 | Status: SHIPPED
Start: 2020-05-28 | End: 2020-08-04

## 2020-05-28 RX ORDER — FLUTICASONE PROPIONATE 50 MCG
1 SPRAY, SUSPENSION (ML) NASAL DAILY
Qty: 3 BOTTLE | Refills: 1 | Status: SHIPPED
Start: 2020-05-28 | End: 2020-06-25 | Stop reason: SDUPTHER

## 2020-05-28 RX ORDER — ALBUTEROL SULFATE 90 UG/1
2 AEROSOL, METERED RESPIRATORY (INHALATION) 4 TIMES DAILY PRN
Qty: 1 INHALER | Refills: 3 | Status: SHIPPED
Start: 2020-05-28 | End: 2020-06-25 | Stop reason: SDUPTHER

## 2020-05-28 RX ORDER — EXENATIDE 2 MG/.85ML
INJECTION, SUSPENSION, EXTENDED RELEASE SUBCUTANEOUS
Qty: 1 PEN | Refills: 5 | Status: SHIPPED
Start: 2020-05-28 | End: 2020-08-04

## 2020-05-28 RX ORDER — POTASSIUM CHLORIDE 1500 MG/1
TABLET, FILM COATED, EXTENDED RELEASE ORAL
Qty: 180 TABLET | Refills: 1 | Status: SHIPPED
Start: 2020-05-28 | End: 2020-08-04

## 2020-05-28 RX ORDER — BUMETANIDE 1 MG/1
TABLET ORAL
Qty: 180 TABLET | Refills: 1 | Status: SHIPPED
Start: 2020-05-28 | End: 2020-08-04

## 2020-05-28 RX ORDER — PANTOPRAZOLE SODIUM 40 MG/1
TABLET, DELAYED RELEASE ORAL
Qty: 90 TABLET | Refills: 1 | Status: SHIPPED
Start: 2020-05-28 | End: 2020-06-02 | Stop reason: SDUPTHER

## 2020-05-28 RX ORDER — GLIMEPIRIDE 4 MG/1
TABLET ORAL
Qty: 90 TABLET | Refills: 1 | Status: SHIPPED
Start: 2020-05-28 | End: 2020-08-04

## 2020-05-28 RX ORDER — METOLAZONE 2.5 MG/1
TABLET ORAL
Qty: 30 TABLET | Refills: 2 | Status: SHIPPED
Start: 2020-05-28 | End: 2020-08-04

## 2020-05-28 RX ORDER — GLUCOSAMINE HCL/CHONDROITIN SU 500-400 MG
CAPSULE ORAL
Qty: 200 STRIP | Refills: 11 | Status: SHIPPED
Start: 2020-05-28 | End: 2020-06-25

## 2020-05-28 RX ORDER — LOSARTAN POTASSIUM 50 MG/1
TABLET ORAL
Qty: 90 TABLET | Refills: 1 | Status: SHIPPED
Start: 2020-05-28 | End: 2020-08-04

## 2020-05-28 NOTE — PROGRESS NOTES
oral agents (triple therapy): metformin (generic), pioglitazone (Actos), sitagliptin (Januvia) and insulin injections: Lantus : nightly, but failed due to various reasons. Most recent HgA1c was 6.5% (February 2020) which is improved from previous value of 7.4% (August 2019). His most recent TSH was 0.28. LDL is 71. Renal function is reduced. The patient has evidence of end organ damage including nephropathy, peripheral neuropathy and peripheral vascular disease. He does not see a Podiatrist for foot care . Last eye exam was unknown. Hypertension   The patient presents today for follow up of HTN. The problem is unable to be evaluated due to VV platform. Risk factors for coronary artery disease include Age > 27, male, HTN, diabetes and elevated cholesterol. Current treatments include bumetanide (Bumex), losartran (Cozaar), metolazone (Zaroxolyn) and metoprolol (Lopressor, Toprol). The patient is compliant all of the time. Lifestyle changes the patient has made include none. Today the patient is complaining of none. Hyperlipidemia  The 10-year ASCVD risk score (Veronica Pyle, et al., 2013) is: 29.4%    Values used to calculate the score:      Age: 81 years      Sex: Male      Is Non- : No      Diabetic: Yes      Tobacco smoker: No      Systolic Blood Pressure: 937 mmHg      Is BP treated: Yes      HDL Cholesterol: 36 mg/dL      Total Cholesterol: 132 mg/dL    Back pain  Patient has a history of ongoing lumbar back pain. Significant neurologic dysfunction due to back issues. Unable to walk due to the weakness of lower extremities- wheelchair bound. Having even more weakness lately. Only able to transfer from bed to chair and chair to toilet. Needing [facility] to [facility]. ROS:  Review of Systems   Constitutional: Negative for chills, fatigue and fever. Respiratory: Positive for shortness of breath. Negative for cough and wheezing.     Cardiovascular: signs and nursing note reviewed. Constitutional:       General: He is not in acute distress. Appearance: Normal appearance. He is well-developed. He is morbidly obese. HENT:      Head: Normocephalic and atraumatic. Right Ear: Hearing and external ear normal.      Left Ear: Hearing and external ear normal.      Nose: Nose normal.   Eyes:      General: Lids are normal. No scleral icterus. Extraocular Movements: Extraocular movements intact. Conjunctiva/sclera: Conjunctivae normal.   Neck:      Musculoskeletal: Normal range of motion. Pulmonary:      Effort: Pulmonary effort is normal. No respiratory distress. Breath sounds: No wheezing. Skin:     Findings: No rash. Neurological:      Mental Status: He is alert and oriented to person, place, and time. Psychiatric:         Mood and Affect: Mood and affect normal.         Speech: Speech normal.         Behavior: Behavior normal.         Thought Content:  Thought content normal.         Labs:  CBC with Differential:    Lab Results   Component Value Date    WBC 7.7 02/16/2020    RBC 3.03 02/16/2020    HGB 8.1 02/16/2020    HCT 25.6 02/16/2020     02/16/2020    MCV 84.4 02/16/2020    MCH 26.6 02/16/2020    MCHC 31.5 02/16/2020    RDW 19.0 02/16/2020    SEGSPCT 71.4 02/16/2020    LYMPHOPCT 15.8 02/16/2020    MONOPCT 8.9 02/16/2020    BASOPCT 0.6 02/16/2020    MONOSABS 0.7 02/16/2020    LYMPHSABS 1.2 02/16/2020    EOSABS 0.3 02/16/2020    BASOSABS 0.0 02/16/2020     CMP:    Lab Results   Component Value Date     03/03/2020    K 3.1 03/03/2020    CL 97 03/03/2020    CO2 31 03/03/2020    BUN 26 03/03/2020    CREATININE 1.4 03/03/2020    GFRAA 117 02/18/2020    AGRATIO 0.8 02/18/2020    LABGLOM 51 03/03/2020    GLUCOSE 212 03/03/2020    PROT 6.2 02/18/2020    LABALBU 2.8 02/18/2020    CALCIUM 9.1 03/03/2020    BILITOT 0.8 02/18/2020    ALKPHOS 105 02/18/2020    AST 15 02/18/2020    ALT 14 02/18/2020     HgBA1c:    Lab Results Component Value Date    LABA1C 6.5 02/08/2020     Microalbumen/Creatinine ratio:  No components found for: RUCREAT  FLP:    Lab Results   Component Value Date    TRIG 127 08/26/2019    HDL 36 08/26/2019    LDLCALC 71 08/26/2019    LABVLDL 25 08/26/2019     TSH:    Lab Results   Component Value Date    TSH 0.28 02/09/2020     PSA:   Lab Results   Component Value Date    PSA 2.53 08/26/2019        Assessment and Plan:  Herbie Martin was seen today for referral - general and diabetes. Diagnoses and all orders for this visit:    Type 2 diabetes mellitus with hyperglycemia, without long-term current use of insulin ([facility])  -     blood glucose monitor strips; Test 4 times a day & as needed for symptoms of irregular blood glucose. -     insulin aspart (NOVOLOG FLEXPEN) 100 UNIT/ML injection pen; Inject 5 Units into the skin 3 times daily (before meals)  -     glimepiride (AMARYL) 4 MG tablet; TK 1 qam with MEALS  -     BYDUREON BCISE 2 MG/0.85ML AUIJ; INJECT 0.85 ML Q WEEK  -     External Referral To Home Health  -     CBC Auto Differential; Future  -     Comprehensive Metabolic Panel; Future  -     Hemoglobin A1C; Future  -     TSH without Reflex; Future  -     Microalbumin / Creatinine Urine Ratio; Future  Patient also follows with Dr. Gabino Luis. A1c has been stable. Due for repeat labs. Lumbar disc disease with radiculopathy  -     HYDROcodone-acetaminophen (NORCO) 7.5-325 MG per tablet; Take 1 tablet by mouth 4 times daily for 30 days.  -     External Referral To [facility] appropriate. Patient needing PT/OT and nursing aids in home for help with transfers and strength training. Will fax order to [facility] disorder due to medical condition  -     LORazepam (ATIVAN) 1 MG tablet; TK 1 T PO TID    Acute on chronic systolic congestive heart failure (HCC)  -     potassium chloride (K-TAB) 20 MEQ TBCR extended release tablet; 1 tablet twice daily  -     metOLazone (ZAROXOLYN) 2.5 MG tablet;  One Monday/Wednesday/Friday  -     albuterol sulfate  (90 Base) MCG/ACT inhaler; Inhale 2 puffs into the lungs 4 times daily as needed (2 puffs)  -     bumetanide (BUMEX) 1 MG tablet; TK 1  T PO BID  -     External Referral To Home Health    Localized edema    Mixed hyperlipidemia  -     atorvastatin (LIPITOR) 20 MG tablet; TK 1 T PO Q NIGHT  -     Lipid Panel; Future    Essential hypertension  -     losartan (COZAAR) 50 MG tablet; TK 1 T PO QD  -     metoprolol tartrate (LOPRESSOR) 50 MG tablet; TK 1  T PO BID    Inability to walk  -     External Referral To Home Health    Vitamin D insufficiency  -     Vitamin D 25 Hydroxy; Future    Other orders  -     fluticasone (FLONASE) 50 MCG/ACT nasal spray; 1 spray by Each Nostril route daily  -     Discontinue: pantoprazole (PROTONIX) 40 MG tablet; TK 1 T PO qam        Return in about 3 months (around 8/28/2020) for Chronic pain medication refills.       Seen By:  Guevara Mercer DO

## 2020-06-02 ENCOUNTER — TELEPHONE (OUTPATIENT)
Dept: PRIMARY CARE CLINIC | Age: 70
End: 2020-06-02

## 2020-06-02 RX ORDER — PANTOPRAZOLE SODIUM 40 MG/1
40 TABLET, DELAYED RELEASE ORAL 2 TIMES DAILY
Qty: 180 TABLET | Refills: 3 | Status: SHIPPED
Start: 2020-06-02 | End: 2020-08-04 | Stop reason: SDUPTHER

## 2020-06-02 RX ORDER — INSULIN LISPRO 100 [IU]/ML
5 INJECTION, SOLUTION INTRAVENOUS; SUBCUTANEOUS
Qty: 13.5 ML | Refills: 3 | Status: SHIPPED
Start: 2020-06-02 | End: 2020-12-23

## 2020-06-02 ASSESSMENT — ENCOUNTER SYMPTOMS
DIARRHEA: 0
CONSTIPATION: 0
ABDOMINAL PAIN: 0
COUGH: 0
NAUSEA: 0
BACK PAIN: 1
WHEEZING: 0
SHORTNESS OF BREATH: 1
VOMITING: 0

## 2020-06-02 NOTE — TELEPHONE ENCOUNTER
Wife calling two scripts they received were incorrect as to how he uses med. He takes pantoprazole 40mg 1 tab bid.  His insulin was sent as Novolog [employer] does not cover) he uses Humalog PRN on a sliding scale if bs is >201  NIKE

## 2020-06-04 ENCOUNTER — TELEPHONE (OUTPATIENT)
Dept: PRIMARY CARE CLINIC | Age: 70
End: 2020-06-04

## 2020-06-04 ENCOUNTER — TELEPHONE (OUTPATIENT)
Dept: FAMILY MEDICINE CLINIC | Age: 70
End: 2020-06-04

## 2020-06-05 NOTE — TELEPHONE ENCOUNTER
He is to continue his same sliding scale PRN use with the Humalog.   Simply put 5 U TID on the Rx so that he received enough pens

## 2020-06-05 NOTE — TELEPHONE ENCOUNTER
Pharmacy filled Humalog, they need to verify the dose on it. Pt's daughter states that he was on a very small amount of Novolog that was on a sliding scale and she is concerned that the Novolog 5 units tid is too high.

## 2020-06-25 ENCOUNTER — VIRTUAL VISIT (OUTPATIENT)
Dept: PRIMARY CARE CLINIC | Age: 70
End: 2020-06-25
Payer: MEDICARE

## 2020-06-25 PROCEDURE — 99214 OFFICE O/P EST MOD 30 MIN: CPT | Performed by: FAMILY MEDICINE

## 2020-06-25 RX ORDER — EMPAGLIFLOZIN 10 MG/1
10 TABLET, FILM COATED ORAL DAILY
COMMUNITY
End: 2020-08-04

## 2020-06-25 RX ORDER — HYDROCODONE BITARTRATE AND ACETAMINOPHEN 7.5; 325 MG/1; MG/1
1 TABLET ORAL EVERY 6 HOURS PRN
Qty: 120 TABLET | Refills: 0 | Status: SHIPPED
Start: 2020-07-25 | End: 2020-08-04

## 2020-06-25 RX ORDER — HYDROCODONE BITARTRATE AND ACETAMINOPHEN 7.5; 325 MG/1; MG/1
1 TABLET ORAL EVERY 6 HOURS PRN
Qty: 120 TABLET | Refills: 0 | Status: SHIPPED
Start: 2020-08-25 | End: 2020-08-04

## 2020-06-25 RX ORDER — LORAZEPAM 1 MG/1
TABLET ORAL
Qty: 90 TABLET | Refills: 2 | Status: SHIPPED
Start: 2020-06-25 | End: 2020-08-04

## 2020-06-25 RX ORDER — FLUTICASONE PROPIONATE 50 MCG
1 SPRAY, SUSPENSION (ML) NASAL DAILY
Qty: 3 BOTTLE | Refills: 1 | Status: SHIPPED
Start: 2020-06-25 | End: 2020-08-04 | Stop reason: SDUPTHER

## 2020-06-25 RX ORDER — HYDROCODONE BITARTRATE AND ACETAMINOPHEN 7.5; 325 MG/1; MG/1
1 TABLET ORAL 4 TIMES DAILY
Qty: 120 TABLET | Refills: 0 | Status: SHIPPED | OUTPATIENT
Start: 2020-06-25 | End: 2020-07-25

## 2020-06-25 RX ORDER — SULFAMETHOXAZOLE AND TRIMETHOPRIM 800; 160 MG/1; MG/1
1 TABLET ORAL 2 TIMES DAILY
Qty: 14 TABLET | Refills: 0 | Status: SHIPPED
Start: 2020-06-25 | End: 2020-08-11 | Stop reason: SDUPTHER

## 2020-06-25 RX ORDER — ALBUTEROL SULFATE 90 UG/1
2 AEROSOL, METERED RESPIRATORY (INHALATION) 4 TIMES DAILY PRN
Qty: 1 INHALER | Refills: 3 | Status: SHIPPED
Start: 2020-06-25 | End: 2020-08-04

## 2020-06-25 RX ORDER — GLUCOSAMINE HCL/CHONDROITIN SU 500-400 MG
CAPSULE ORAL
Qty: 350 STRIP | Refills: 5 | Status: SHIPPED
Start: 2020-06-25 | End: 2020-08-04 | Stop reason: SDUPTHER

## 2020-06-25 ASSESSMENT — ENCOUNTER SYMPTOMS
COUGH: 0
NAUSEA: 0
COLOR CHANGE: 1
WHEEZING: 0
ABDOMINAL PAIN: 0
DIARRHEA: 0
CONSTIPATION: 0
VOMITING: 0
BACK PAIN: 1
SHORTNESS OF BREATH: 1

## 2020-08-04 ENCOUNTER — VIRTUAL VISIT (OUTPATIENT)
Dept: PRIMARY CARE CLINIC | Age: 70
End: 2020-08-04
Payer: MEDICARE

## 2020-08-04 PROCEDURE — 99214 OFFICE O/P EST MOD 30 MIN: CPT | Performed by: FAMILY MEDICINE

## 2020-08-04 RX ORDER — BUMETANIDE 1 MG/1
1 TABLET ORAL DAILY
Qty: 90 TABLET | Refills: 3 | Status: SHIPPED
Start: 2020-08-04 | End: 2020-12-16 | Stop reason: SDUPTHER

## 2020-08-04 RX ORDER — METOPROLOL TARTRATE 50 MG/1
1 TABLET, FILM COATED ORAL 2 TIMES DAILY
COMMUNITY
End: 2020-08-04 | Stop reason: SDUPTHER

## 2020-08-04 RX ORDER — METOPROLOL TARTRATE 50 MG/1
50 TABLET, FILM COATED ORAL 2 TIMES DAILY
Qty: 180 TABLET | Refills: 3 | Status: SHIPPED
Start: 2020-08-04 | End: 2020-12-23

## 2020-08-04 RX ORDER — METOLAZONE 2.5 MG/1
1 TABLET ORAL
COMMUNITY
End: 2020-08-11 | Stop reason: SDUPTHER

## 2020-08-04 RX ORDER — LOSARTAN POTASSIUM 50 MG/1
50 TABLET ORAL DAILY
Qty: 90 TABLET | Refills: 3 | Status: SHIPPED
Start: 2020-08-04 | End: 2020-08-26 | Stop reason: SDUPTHER

## 2020-08-04 RX ORDER — POTASSIUM CHLORIDE 1500 MG/1
TABLET, FILM COATED, EXTENDED RELEASE ORAL
Qty: 180 TABLET | Refills: 3 | Status: SHIPPED
Start: 2020-08-04 | End: 2021-08-13

## 2020-08-04 RX ORDER — FUROSEMIDE 40 MG/1
1 TABLET ORAL 2 TIMES DAILY
COMMUNITY
End: 2020-08-04 | Stop reason: SDUPTHER

## 2020-08-04 RX ORDER — MULTIVIT-MIN/IRON/FOLIC ACID/K 18-600-40
2000 CAPSULE ORAL 2 TIMES DAILY
Qty: 180 CAPSULE | Refills: 3 | Status: SHIPPED
Start: 2020-08-04 | End: 2020-12-23

## 2020-08-04 RX ORDER — HYDROCODONE BITARTRATE AND ACETAMINOPHEN 5; 325 MG/1; MG/1
1 TABLET ORAL 4 TIMES DAILY
COMMUNITY
End: 2020-08-26 | Stop reason: SDUPTHER

## 2020-08-04 RX ORDER — BUMETANIDE 1 MG/1
1 TABLET ORAL DAILY
COMMUNITY
End: 2020-08-04 | Stop reason: SDUPTHER

## 2020-08-04 RX ORDER — PIOGLITAZONEHYDROCHLORIDE 45 MG/1
1 TABLET ORAL DAILY
COMMUNITY
End: 2020-08-26

## 2020-08-04 RX ORDER — ATORVASTATIN CALCIUM 20 MG/1
1 TABLET, FILM COATED ORAL EVERY MORNING
COMMUNITY
End: 2020-08-04 | Stop reason: SDUPTHER

## 2020-08-04 RX ORDER — POTASSIUM CHLORIDE 1500 MG/1
TABLET, FILM COATED, EXTENDED RELEASE ORAL
COMMUNITY
Start: 2020-04-30 | End: 2020-08-04 | Stop reason: SDUPTHER

## 2020-08-04 RX ORDER — ATORVASTATIN CALCIUM 20 MG/1
20 TABLET, FILM COATED ORAL EVERY MORNING
Qty: 90 TABLET | Refills: 3 | Status: SHIPPED
Start: 2020-08-04 | End: 2020-08-26 | Stop reason: SDUPTHER

## 2020-08-04 RX ORDER — ALBUTEROL SULFATE 90 UG/1
2 AEROSOL, METERED RESPIRATORY (INHALATION)
COMMUNITY
Start: 2020-06-25 | End: 2021-07-06

## 2020-08-04 RX ORDER — GLIMEPIRIDE 4 MG/1
4 TABLET ORAL
Qty: 90 TABLET | Refills: 3 | Status: SHIPPED
Start: 2020-08-04 | End: 2020-12-23

## 2020-08-04 RX ORDER — LORAZEPAM 1 MG/1
1 TABLET ORAL 2 TIMES DAILY
COMMUNITY
End: 2020-08-26 | Stop reason: SDUPTHER

## 2020-08-04 RX ORDER — PANTOPRAZOLE SODIUM 40 MG/1
40 TABLET, DELAYED RELEASE ORAL DAILY
Qty: 90 TABLET | Refills: 3 | Status: SHIPPED
Start: 2020-08-04 | End: 2020-12-23

## 2020-08-04 RX ORDER — GLUCOSAMINE HCL/CHONDROITIN SU 500-400 MG
CAPSULE ORAL
Qty: 350 STRIP | Refills: 5 | Status: SHIPPED
Start: 2020-08-04 | End: 2020-08-26

## 2020-08-04 RX ORDER — EXENATIDE 2 MG/.85ML
INJECTION, SUSPENSION, EXTENDED RELEASE SUBCUTANEOUS
COMMUNITY
End: 2021-04-05

## 2020-08-04 RX ORDER — FUROSEMIDE 40 MG/1
40 TABLET ORAL 2 TIMES DAILY
Qty: 180 TABLET | Refills: 3 | Status: SHIPPED
Start: 2020-08-04 | End: 2020-08-26

## 2020-08-04 RX ORDER — FLUTICASONE PROPIONATE 50 MCG
1 SPRAY, SUSPENSION (ML) NASAL DAILY
Qty: 3 BOTTLE | Refills: 3 | Status: SHIPPED
Start: 2020-08-04 | End: 2021-04-05 | Stop reason: SDUPTHER

## 2020-08-04 RX ORDER — EMPAGLIFLOZIN 10 MG/1
TABLET, FILM COATED ORAL
COMMUNITY
End: 2020-08-04

## 2020-08-04 RX ORDER — CYCLOBENZAPRINE HCL 10 MG
TABLET ORAL
COMMUNITY
End: 2020-08-26

## 2020-08-04 RX ORDER — GLIMEPIRIDE 4 MG/1
1 TABLET ORAL DAILY
COMMUNITY
End: 2020-08-04 | Stop reason: SDUPTHER

## 2020-08-04 RX ORDER — PNV NO.95/FERROUS FUM/FOLIC AC 28MG-0.8MG
325 TABLET ORAL EVERY OTHER DAY
Qty: 90 TABLET | Refills: 3 | Status: SHIPPED | OUTPATIENT
Start: 2020-08-04

## 2020-08-04 RX ORDER — NAPROXEN 500 MG/1
1 TABLET ORAL 2 TIMES DAILY
COMMUNITY
End: 2020-08-26

## 2020-08-04 RX ORDER — LOSARTAN POTASSIUM 50 MG/1
1 TABLET ORAL DAILY
COMMUNITY
End: 2020-08-04 | Stop reason: SDUPTHER

## 2020-08-04 ASSESSMENT — ENCOUNTER SYMPTOMS
ABDOMINAL PAIN: 0
SHORTNESS OF BREATH: 1
NAUSEA: 0
BACK PAIN: 1
DIARRHEA: 0
COUGH: 0
COLOR CHANGE: 1
WHEEZING: 0
VOMITING: 0
CONSTIPATION: 0

## 2020-08-04 NOTE — PROGRESS NOTES
20  Nicolette Moise   : 1950 Sex: male  Age: 81 y.o. Chief Complaint   Patient presents with    Diabetes     sugar this am 144     HPI:  81 y.o. male patient of Dr. Samantha Lepe presents today for 1 month(s) follow up of chronic medical conditions. Patient's chart, medical, surgical and medication history all reviewed. TeleMedicine Patient Consent    This visit was performed as a virtual video visit using a synchronous, two-way, audio-video telehealth technology platform. Patient identification was verified at the start of the visit, including the patient's telephone number and physical location. I discussed with the patient the nature of our telehealth visits, that:     1. Due to the nature of an audio- video modality, the only components of a physical exam that could be done are the elements supported by direct observation. 2. I would evaluate the patient and recommend diagnostics and treatments based on my assessment. 3. If it was felt that the patient should be evaluated in clinic or an emergency room setting, then they would be directed there. 4. Our sessions are not being recorded and that personal health information is protected. 5. Our team would provide follow up care in person if/when the patient needs it. Patient does agree to proceed with telemedicine consultation. Patient's location: home address in [facility]. Physician location: other address in [facility]. Other people involved in call:  Karely Umana LPN. Time spent:  Not billed by time    This visit was completed virtually using Doxy. me    Diabetes Mellitus  81 y.o. male presents for follow up of type 2 diabetes. Current diabetic medications include: oral agent (monotherapy): glimepiride (Amaryl), insulin injections: Humalog : SSI and Bydureon.   Previous medications tried include: oral agents (triple therapy): metformin (generic), pioglitazone (Actos), sitagliptin (Januvia) and insulin injections: Lantus : nightly, but failed due to various reasons. Most recent HgA1c was 6.7% (June 2020)--- 6.5% (February 2020)---7.4% (August 2019). His most recent TSH was 1.15. LDL is 60. Renal function is reduced at GFR= 55. The patient has evidence of end organ damage including nephropathy, peripheral neuropathy and peripheral vascular disease. He does not see a Podiatrist for foot care . Last eye exam was unknown. Hypertension   The patient presents today for follow up of HTN. The problem is unable to be evaluated due to VV platform. Risk factors for coronary artery disease include Age > 27, male, HTN, diabetes and elevated cholesterol. Current treatments include bumetanide (Bumex), losartran (Cozaar), metolazone (Zaroxolyn) and metoprolol (Lopressor, Toprol). The patient is compliant all of the time. Lifestyle changes the patient has made include none. Today the patient is complaining of peripheral edema. He notes that he believes he has cellulitis again on his legs. They are red, warm and more swollen than usual.  He does wear compression stockings. Hyperlipidemia  The 10-year ASCVD risk score (Eliana Hernandez., et al., 2013) is: 29.4%    Values used to calculate the score:      Age: 81 years      Sex: Male      Is Non- : No      Diabetic: Yes      Tobacco smoker: No      Systolic Blood Pressure: 637 mmHg      Is BP treated: Yes      HDL Cholesterol: 36 mg/dL      Total Cholesterol: 132 mg/dL    Back pain  Patient has a history of ongoing lumbar back pain. Significant neurologic dysfunction due to back issues. Unable to walk due to the weakness of lower extremities- wheelchair bound. Having even more weakness lately. Only able to transfer from bed to chair and chair to toilet. He has had [facility] home [facility] back out to the home, but wife states that his visit numbers are complete. She notes that he still cannot even stand on his own.      ROS:  Review of Systems   Constitutional: Negative for  Lisinopril     Zithromax [Azithromycin]     Ciprofloxacin Hcl Swelling and Rash    Keflex [Cephalexin] Swelling and Rash       Past Medical History:   Diagnosis Date    DDD (degenerative disc disease), cervical     DDD (degenerative disc disease), thoracic     Diabetes mellitus ([address]     Follicular adenocarcinoma, moderately differentiated ([address]     rectum     Hyperlipidemia     Hypertension     Obesity     morbid     NICOLLE (obstructive sleep apnea)     Peripheral edema     Stasis dermatitis     Thyroid nodule     Vitamin D insufficiency      Past Surgical History:   Procedure Laterality Date    CERVICAL FUSION      COLONOSCOPY      EYE SURGERY      cataract with lol implants - OU    HERNIA REPAIR      umbilical     RECTAL SURGERY      transanal excision rectal cancer     SIGMOIDOSCOPY      procotoscopy     UPPER GASTROINTESTINAL ENDOSCOPY  06/15/2020    Radha- erosive gastritis     Family History   Problem Relation Age of Onset    Cancer Mother         breast     Diabetes Father     Diabetes Brother     Heart Attack Brother     Other Brother         valvular heart disease    Cancer Brother         lung      Social History     Socioeconomic History    Marital status:      Spouse name: Not on file    Number of children: Not on file    Years of education: Not on file    Highest education level: Not on file   Occupational History    Not on file   Social Needs    Financial resource strain: Not on file    Food insecurity     Worry: Not on file     Inability: Not on file    Transportation needs     Medical: Not on file     Non-medical: Not on file   Tobacco Use    Smoking status: Never Smoker    Smokeless tobacco: Never Used   Substance and Sexual Activity    Alcohol use: Never     Frequency: Never    Drug use: Never    Sexual activity: Not on file   Lifestyle    Physical activity     Days per week: Not on file     Minutes per session: Not on file    Stress: Not on file   Relationships    Social connections     Talks on phone: Not on file     Gets together: Not on file     Attends Buddhist service: Not on file     Active member of club or organization: Not on file     Attends meetings of clubs or organizations: Not on file     Relationship status: Not on file    Intimate partner violence     Fear of current or ex partner: Not on file     Emotionally abused: Not on file     Physically abused: Not on file     Forced sexual activity: Not on file   Other Topics Concern    Not on file   Social History Narrative    Not on file     Patient-Reported Vitals 8/4/2020   Patient-Reported Weight 385   Patient-Reported Systolic 886   Patient-Reported Diastolic 66   Patient-Reported Pulse 53   Patient-Reported Temperature 95.9   Patient-Reported SpO2 93        Physical Exam:  Physical Exam  Vitals signs and nursing note reviewed. Constitutional:       General: He is not in acute distress. Appearance: Normal appearance. He is well-developed. He is morbidly obese. HENT:      Head: Normocephalic and atraumatic. Right Ear: Hearing and external ear normal.      Left Ear: Hearing and external ear normal.      Nose: Nose normal.   Eyes:      General: Lids are normal. No scleral icterus. Extraocular Movements: Extraocular movements intact. Conjunctiva/sclera: Conjunctivae normal.   Neck:      Musculoskeletal: Normal range of motion. Pulmonary:      Effort: Pulmonary effort is normal. No respiratory distress. Breath sounds: No wheezing. Skin:     Findings: No rash. Neurological:      Mental Status: He is alert and oriented to person, place, and time. Psychiatric:         Mood and Affect: Mood and affect normal.         Speech: Speech normal.         Behavior: Behavior normal.         Thought Content:  Thought content normal.         Labs:  CBC with Differential:    Lab Results   Component Value Date    WBC 7.7 02/16/2020    RBC 3.03 02/16/2020    HGB 8.1 02/16/2020    HCT 25.6 02/16/2020     02/16/2020    MCV 84.4 02/16/2020    MCH 26.6 02/16/2020    MCHC 31.5 02/16/2020    RDW 19.0 02/16/2020    SEGSPCT 71.4 02/16/2020    LYMPHOPCT 15.8 02/16/2020    MONOPCT 8.9 02/16/2020    BASOPCT 0.6 02/16/2020    MONOSABS 0.7 02/16/2020    LYMPHSABS 1.2 02/16/2020    EOSABS 0.3 02/16/2020    BASOSABS 0.0 02/16/2020     CMP:    Lab Results   Component Value Date     06/12/2020    K 3.5 06/12/2020    CL 96 06/12/2020    CO2 34 06/12/2020    BUN 40 06/12/2020    CREATININE 1.3 06/12/2020    GFRAA 117 02/18/2020    AGRATIO 0.8 02/18/2020    LABGLOM 55 06/12/2020    LABGLOM 51 03/03/2020    GLUCOSE 114 06/12/2020    PROT 6.2 02/18/2020    LABALBU 3.5 06/12/2020    CALCIUM 9.0 06/12/2020    BILITOT 0.7 06/12/2020    ALKPHOS 98 06/12/2020    AST 18 06/12/2020    ALT 19 06/12/2020     HgBA1c:    Lab Results   Component Value Date    LABA1C 6.7 06/12/2020     Microalbumen/Creatinine ratio:  No components found for: RUCREAT  FLP:    Lab Results   Component Value Date    TRIG 127 08/26/2019    HDL 36 08/26/2019    LDLCALC 71 08/26/2019    LABVLDL 25 08/26/2019     TSH:    Lab Results   Component Value Date    TSH 0.28 02/09/2020     PSA:   Lab Results   Component Value Date    PSA 2.53 08/26/2019        Assessment and Plan:  Judith Mueller was seen today for diabetes. Diagnoses and all orders for this visit:    Type 2 diabetes mellitus with hyperglycemia, without long-term current use of insulin (HCC)  -     blood glucose monitor strips; Test 3 times a day & as needed for symptoms of irregular blood glucose. Dispense sufficient amount for indicated testing frequency plus additional to accommodate PRN testing needs. -     glimepiride (AMARYL) 4 MG tablet; Take 1 tablet by mouth every morning (before breakfast)  -     External Referral To Home Health  A1c is controlled, but patient was instructed to stop Actos.   He was switched to [place], but didn't think it was working so he went back to Actos on his own. With his history of CHF and chronic leg swelling, reiterated that he needs to discuss alternative options for treatment with endocrinology. He needs to be off Actos at this time. Essential hypertension  -     potassium chloride (KLOR-CON M) 20 MEQ TBCR extended release tablet; TK 1 T PO BID  -     losartan (COZAAR) 50 MG tablet; Take 1 tablet by mouth daily    Atrial fibrillation with controlled ventricular response (HCC)  -     metoprolol tartrate (LOPRESSOR) 50 MG tablet; Take 1 tablet by mouth 2 times daily    Mixed hyperlipidemia  -     atorvastatin (LIPITOR) 20 MG tablet; Take 1 tablet by mouth every morning    Chronic rhinitis  -     fluticasone (FLONASE) 50 MCG/ACT nasal spray; 1 spray by Each Nostril route daily    Chronic congestive heart failure with left ventricular diastolic dysfunction (HCC)  -     bumetanide (BUMEX) 1 MG tablet; Take 1 tablet by mouth daily  -     furosemide (LASIX) 40 MG tablet; Take 1 tablet by mouth 2 times daily  -     External Referral To Home Health  Patient's wife claims that he is on both Bumex and Lasix. Will continue for now. Lumbar disc disease with radiculopathy  -     External Referral To Home Health    Inability to walk  -     External Referral To Home Health  Will [facility] to [facility]. Gastroesophageal reflux disease without esophagitis  -     pantoprazole (PROTONIX) 40 MG tablet; Take 1 tablet by mouth daily    Other iron deficiency anemia  -     Ferrous Sulfate (IRON) 325 (65 Fe) MG TABS; Take 325 mg by mouth every other day    Vitamin D deficiency  -     Cholecalciferol (VITAMIN D) 50 MCG (2000 UT) CAPS capsule; Take 2,000 Units by mouth 2 times daily    Patient has far too many health issues to continue doing virtual visits. After next medication refill visit, he needs to start being seen in the office. Return in about 4 weeks (around 9/1/2020) for Chronic pain medication refills.       Seen Fredy Reyes, DO

## 2020-08-06 DIAGNOSIS — E55.9 VITAMIN D INSUFFICIENCY: ICD-10-CM

## 2020-08-06 DIAGNOSIS — E11.65 TYPE 2 DIABETES MELLITUS WITH HYPERGLYCEMIA, WITHOUT LONG-TERM CURRENT USE OF INSULIN (HCC): ICD-10-CM

## 2020-08-06 LAB
ALBUMIN SERPL-MCNC: 3.5 G/DL
ALP BLD-CCNC: 98 U/L
ALT SERPL-CCNC: 19 U/L
ANION GAP SERPL CALCULATED.3IONS-SCNC: NORMAL MMOL/L
AST SERPL-CCNC: 18 U/L
AVERAGE GLUCOSE: NORMAL
BASOPHILS ABSOLUTE: NORMAL
BASOPHILS RELATIVE PERCENT: NORMAL
BILIRUB SERPL-MCNC: 0.7 MG/DL (ref 0.1–1.4)
BUN BLDV-MCNC: 40 MG/DL
CALCIUM SERPL-MCNC: 9 MG/DL
CHLORIDE BLD-SCNC: 96 MMOL/L
CO2: 34 MMOL/L
CREAT SERPL-MCNC: 1.3 MG/DL
CREATININE, URINE: NORMAL
EOSINOPHILS ABSOLUTE: NORMAL
EOSINOPHILS RELATIVE PERCENT: NORMAL
GFR CALCULATED: 55
GLUCOSE BLD-MCNC: 114 MG/DL
HBA1C MFR BLD: 6.7 %
HCT VFR BLD CALC: NORMAL %
HEMOGLOBIN: NORMAL
LYMPHOCYTES ABSOLUTE: NORMAL
LYMPHOCYTES RELATIVE PERCENT: NORMAL
MCH RBC QN AUTO: NORMAL PG
MCHC RBC AUTO-ENTMCNC: NORMAL G/DL
MCV RBC AUTO: NORMAL FL
MICROALBUMIN/CREAT 24H UR: <2 MG/G{CREAT}
MICROALBUMIN/CREAT UR-RTO: 94.2
MONOCYTES ABSOLUTE: NORMAL
MONOCYTES RELATIVE PERCENT: NORMAL
NEUTROPHILS ABSOLUTE: NORMAL
NEUTROPHILS RELATIVE PERCENT: NORMAL
PDW BLD-RTO: NORMAL %
PLATELET # BLD: NORMAL 10*3/UL
PMV BLD AUTO: NORMAL FL
POTASSIUM SERPL-SCNC: 3.5 MMOL/L
RBC # BLD: NORMAL 10*6/UL
SODIUM BLD-SCNC: 137 MMOL/L
TOTAL PROTEIN: 7.4
VITAMIN D 25-HYDROXY: 55
VITAMIN D2, 25 HYDROXY: NORMAL
VITAMIN D3,25 HYDROXY: NORMAL
WBC # BLD: NORMAL 10*3/UL

## 2020-08-11 RX ORDER — METOLAZONE 2.5 MG/1
2.5 TABLET ORAL
Qty: 36 TABLET | Refills: 1 | Status: SHIPPED
Start: 2020-08-12 | End: 2021-02-03 | Stop reason: SDUPTHER

## 2020-08-11 RX ORDER — SULFAMETHOXAZOLE AND TRIMETHOPRIM 800; 160 MG/1; MG/1
1 TABLET ORAL 2 TIMES DAILY
Qty: 14 TABLET | Refills: 0 | Status: SHIPPED | OUTPATIENT
Start: 2020-08-11 | End: 2020-08-18

## 2020-08-11 RX ORDER — SULFAMETHOXAZOLE AND TRIMETHOPRIM 800; 160 MG/1; MG/1
1 TABLET ORAL 2 TIMES DAILY
Qty: 14 TABLET | Refills: 0 | Status: CANCELLED | OUTPATIENT
Start: 2020-08-11 | End: 2020-08-18

## 2020-08-11 NOTE — TELEPHONE ENCOUNTER
I discussed all of this with the patient and his wife during his appointment. I asked if he was taking all three water pills and they told me yes- if the lasix was stopped in the hospital for a particular reason, then he should stay off of it. I also told him to stay off the Actos and call the endocrinologist for another recommendation if the Jardiance isn't working as well.   He has to have his next appt be in office rather than virtual- too much room for errors at this point

## 2020-08-11 NOTE — TELEPHONE ENCOUNTER
Pt requesting refill for bactrim. Pt states that he has always had a script in the past for any time his legs swell.

## 2020-08-11 NOTE — TELEPHONE ENCOUNTER
Pt's wife requesting refill for metolazone. Med pended. She is also asking if you want him to continue taking the lasix. She said that he was taken off of that in the hospital and he has not been taking it since then and med was refilled at his last visit. Also, she said that his endocrinologist stopped his actos and put him on the jardiance. She said that the jardiance was not helping. She wants to know if you want him to start taking the actos again. If so he will need a refill.

## 2020-08-26 ENCOUNTER — OFFICE VISIT (OUTPATIENT)
Dept: PRIMARY CARE CLINIC | Age: 70
End: 2020-08-26
Payer: MEDICARE

## 2020-08-26 VITALS
HEART RATE: 99 BPM | WEIGHT: 315 LBS | SYSTOLIC BLOOD PRESSURE: 122 MMHG | DIASTOLIC BLOOD PRESSURE: 80 MMHG | BODY MASS INDEX: 52.19 KG/M2 | TEMPERATURE: 97.5 F

## 2020-08-26 PROCEDURE — 90732 PPSV23 VACC 2 YRS+ SUBQ/IM: CPT | Performed by: FAMILY MEDICINE

## 2020-08-26 PROCEDURE — G0009 ADMIN PNEUMOCOCCAL VACCINE: HCPCS | Performed by: FAMILY MEDICINE

## 2020-08-26 PROCEDURE — 99214 OFFICE O/P EST MOD 30 MIN: CPT | Performed by: FAMILY MEDICINE

## 2020-08-26 RX ORDER — ATORVASTATIN CALCIUM 20 MG/1
20 TABLET, FILM COATED ORAL EVERY MORNING
Qty: 90 TABLET | Refills: 3 | Status: SHIPPED
Start: 2020-08-26 | End: 2020-12-23

## 2020-08-26 RX ORDER — HYDROCODONE BITARTRATE AND ACETAMINOPHEN 5; 325 MG/1; MG/1
1 TABLET ORAL EVERY 6 HOURS PRN
Qty: 120 TABLET | Refills: 0 | Status: SHIPPED | OUTPATIENT
Start: 2020-10-21 | End: 2020-11-20

## 2020-08-26 RX ORDER — EMPAGLIFLOZIN 25 MG/1
25 TABLET, FILM COATED ORAL DAILY
COMMUNITY
Start: 2020-08-19 | End: 2021-10-08 | Stop reason: SDUPTHER

## 2020-08-26 RX ORDER — HYDROCODONE BITARTRATE AND ACETAMINOPHEN 5; 325 MG/1; MG/1
1 TABLET ORAL EVERY 6 HOURS PRN
Qty: 120 TABLET | Refills: 0 | Status: SHIPPED | OUTPATIENT
Start: 2020-09-23 | End: 2020-10-23

## 2020-08-26 RX ORDER — LORAZEPAM 1 MG/1
1 TABLET ORAL 2 TIMES DAILY
Qty: 180 TABLET | Refills: 0 | Status: SHIPPED
Start: 2020-08-26 | End: 2020-08-31 | Stop reason: SDUPTHER

## 2020-08-26 RX ORDER — LOSARTAN POTASSIUM 50 MG/1
50 TABLET ORAL DAILY
Qty: 90 TABLET | Refills: 3 | Status: SHIPPED
Start: 2020-08-26 | End: 2020-12-23

## 2020-08-26 RX ORDER — HYDROCODONE BITARTRATE AND ACETAMINOPHEN 5; 325 MG/1; MG/1
1 TABLET ORAL 4 TIMES DAILY
Qty: 120 TABLET | Refills: 0 | Status: SHIPPED | OUTPATIENT
Start: 2020-08-26 | End: 2020-09-25

## 2020-08-26 ASSESSMENT — ENCOUNTER SYMPTOMS
BACK PAIN: 1
DIARRHEA: 0
COLOR CHANGE: 1
VOMITING: 0
CONSTIPATION: 0
WHEEZING: 0
NAUSEA: 0
COUGH: 0
SHORTNESS OF BREATH: 0
ABDOMINAL PAIN: 0

## 2020-08-26 NOTE — PROGRESS NOTES
20  Geoffery Aase   : 1950 Sex: male  Age: 81 y.o. Chief Complaint   Patient presents with    Diabetes    Hypertension     HPI:  81 y.o. male patient of Dr. Doris Dale presents today for 1 month(s) follow up of chronic medical conditions, medication refills  Patient's chart, medical, surgical and medication history all reviewed. Diabetes Mellitus  81 y.o. male presents for follow up of type 2 diabetes. Current diabetic medications include: oral agents (dual therapy): glimepiride (Amaryl), Jardiance, insulin injections: SSI prn for BS >200 and Bydureon. Previous medications tried include: oral agents (triple therapy): metformin (generic), pioglitazone (Actos), sitagliptin (Januvia) and insulin injections: Lantus : nightly, but failed due to various reasons. Most recent HgA1c was 6.7% (2020)--- 6.5% (2020)---7.4% (2019). His most recent TSH was 1.15. LDL is 60. Renal function is reduced at GFR= 55. The patient has evidence of end organ damage including nephropathy, peripheral neuropathy and peripheral vascular disease. He does not see a Podiatrist for foot care . Last eye exam was unknown. Had labs drawn for Endo yesterday. Swelling has mildly improved since stopping Actos and starting Jardiance. Hypertension   The patient presents today for follow up of HTN. The problem is well controlled. Risk factors for coronary artery disease include Age > 27, male, HTN, diabetes and elevated cholesterol. Current treatments include bumetanide (Bumex), losartan (Cozaar), metolazone (Zaroxolyn) and metoprolol (Lopressor, Toprol). The patient is compliant all of the time. Lifestyle changes the patient has made include none. Today the patient is complaining of peripheral edema. He does wear compression stockings.          Hyperlipidemia  The 10-year ASCVD risk score (Too Lemons, et al., 2013) is: 26.1%    Values used to calculate the score:      Age: 81 years Sex: Male      Is Non- : No      Diabetic: Yes      Tobacco smoker: No      Systolic Blood Pressure: 848 mmHg      Is BP treated: Yes      HDL Cholesterol: 36 mg/dL      Total Cholesterol: 132 mg/dL    Back pain  Patient has a history of ongoing lumbar back pain. Significant neurologic dysfunction due to back issues. Unable to walk due to the weakness of lower extremities- wheelchair bound. Having even more weakness lately. ROS:  Review of Systems   Constitutional: Negative for chills, fatigue and fever. Respiratory: Negative for cough, shortness of breath and wheezing. Cardiovascular: Positive for leg swelling. Negative for chest pain and palpitations. Gastrointestinal: Negative for abdominal pain, constipation, diarrhea, nausea and vomiting. Musculoskeletal: Positive for arthralgias, back pain and gait problem. Skin: Positive for color change. Negative for rash. Neurological: Positive for weakness and numbness. Negative for dizziness and headaches. Psychiatric/Behavioral: Negative for dysphoric mood. The patient is not nervous/anxious. All other systems reviewed and are negative.        Current Outpatient Medications on File Prior to Visit   Medication Sig Dispense Refill    metOLazone (ZAROXOLYN) 2.5 MG tablet Take 1 tablet by mouth three times a week 36 tablet 1    albuterol sulfate  (90 Base) MCG/ACT inhaler Inhale 2 puffs into the lungs      Exenatide ER (BYDUREON BCISE) 2 MG/0.85ML AUIJ Bydureon BCise 2 mg/0.85 mL subcutaneous auto-injector   ADM 0.85 ML SC Q WK      Ferrous Sulfate (IRON) 325 (65 Fe) MG TABS Take 325 mg by mouth every other day 90 tablet 3    fluticasone (FLONASE) 50 MCG/ACT nasal spray 1 spray by Each Nostril route daily 3 Bottle 3    pantoprazole (PROTONIX) 40 MG tablet Take 1 tablet by mouth daily 90 tablet 3    potassium chloride (KLOR-CON M) 20 MEQ TBCR extended release tablet TK 1 T PO  tablet 3    metoprolol tartrate (LOPRESSOR) 50 MG tablet Take 1 tablet by mouth 2 times daily 180 tablet 3    bumetanide (BUMEX) 1 MG tablet Take 1 tablet by mouth daily 90 tablet 3    Cholecalciferol (VITAMIN D) 50 MCG (2000 UT) CAPS capsule Take 2,000 Units by mouth 2 times daily 180 capsule 3    glimepiride (AMARYL) 4 MG tablet Take 1 tablet by mouth every morning (before breakfast) 90 tablet 3    insulin lispro, 1 Unit Dial, (HUMALOG KWIKPEN) 100 UNIT/ML SOPN Inject 5 Units into the skin 3 times daily (before meals) 13.5 mL 3    Ascorbic Acid (VITAMIN C) 250 MG tablet Take 250 mg by mouth daily      Insulin Pen Needle (PEN NEEDLES) 31G X 5 MM MISC by Does not apply route 2 times daily      JARDIANCE 25 MG tablet Take 25 mg by mouth Daily       No current facility-administered medications on file prior to visit.         Allergies   Allergen Reactions    Cleocin [Clindamycin Hcl]     Codeine     Eucerin [Albolene]     Glucophage [Metformin Hcl]     Januvia [Sitagliptin]     Lisinopril     Zithromax [Azithromycin]     Ciprofloxacin Hcl Swelling and Rash    Keflex [Cephalexin] Swelling and Rash       Past Medical History:   Diagnosis Date    DDD (degenerative disc disease), cervical     DDD (degenerative disc disease), thoracic     Diabetes mellitus ([address]     Follicular adenocarcinoma, moderately differentiated ([address]     rectum     Hyperlipidemia     Hypertension     Obesity     morbid     NICOLLE (obstructive sleep apnea)     Peripheral edema     Stasis dermatitis     Thyroid nodule     Vitamin D insufficiency      Past Surgical History:   Procedure Laterality Date    CERVICAL FUSION      COLONOSCOPY      EYE SURGERY      cataract with lol implants - OU    HERNIA REPAIR      umbilical     RECTAL SURGERY      transanal excision rectal cancer     SIGMOIDOSCOPY      procotoscopy     UPPER GASTROINTESTINAL ENDOSCOPY  06/15/2020    Radha- erosive gastritis     Family History   Problem Relation Age of Onset    Cancer Mother         breast     Diabetes Father     Diabetes Brother     Heart Attack Brother     Other Brother         valvular heart disease    Cancer Brother         lung      Social History     Socioeconomic History    Marital status:      Spouse name: Not on file    Number of children: Not on file    Years of education: Not on file    Highest education level: Not on file   Occupational History    Not on file   Social Needs    Financial resource strain: Not on file    Food insecurity     Worry: Not on file     Inability: Not on file   [employer] needs     Medical: Not on file     Non-medical: Not on file   Tobacco Use    Smoking status: Never Smoker    Smokeless tobacco: Never Used   Substance and Sexual Activity    Alcohol use: Never     Frequency: Never    Drug use: Never    Sexual activity: Not on file   Lifestyle    Physical activity     Days per week: Not on file     Minutes per session: Not on file    Stress: Not on file   Relationships    Social connections     Talks on phone: Not on file     Gets together: Not on file     Attends Anabaptist service: Not on file     Active member of club or organization: Not on file     Attends meetings of clubs or organizations: Not on file     Relationship status: Not on file    Intimate partner violence     Fear of current or ex partner: Not on file     Emotionally abused: Not on file     Physically abused: Not on file     Forced sexual activity: Not on file   Other Topics Concern    Not on file   Social History Narrative    Not on file     Vitals:    08/26/20 1044   BP: 122/80   Pulse: 99   Temp: 97.5 °F (36.4 °C)     Physical Exam:  Physical Exam  Vitals signs and nursing note reviewed. Constitutional:       General: He is not in acute distress. Appearance: Normal appearance. He is well-developed. He is morbidly obese. HENT:      Head: Normocephalic and atraumatic.       Right Ear: Hearing and external ear normal.      Left 06/12/2020    CL 96 06/12/2020    CO2 34 06/12/2020    BUN 40 06/12/2020    CREATININE 1.3 06/12/2020    GFRAA 117 02/18/2020    AGRATIO 0.8 02/18/2020    LABGLOM 55 06/12/2020    LABGLOM 51 03/03/2020    GLUCOSE 114 06/12/2020    PROT 6.2 02/18/2020    LABALBU 3.5 06/12/2020    CALCIUM 9.0 06/12/2020    BILITOT 0.7 06/12/2020    ALKPHOS 98 06/12/2020    AST 18 06/12/2020    ALT 19 06/12/2020     HgBA1c:    Lab Results   Component Value Date    LABA1C 6.7 06/12/2020     Microalbumen/Creatinine ratio:  No components found for: RUCREAT  FLP:    Lab Results   Component Value Date    TRIG 127 08/26/2019    HDL 36 08/26/2019    LDLCALC 71 08/26/2019    LABVLDL 25 08/26/2019     TSH:    Lab Results   Component Value Date    TSH 0.28 02/09/2020     PSA:   Lab Results   Component Value Date    PSA 2.53 08/26/2019        Assessment and Plan:  Edna Womack was seen today for diabetes and hypertension. Diagnoses and all orders for this visit:    Lumbar disc disease with radiculopathy  -     HYDROcodone-acetaminophen (NORCO) 5-325 MG per tablet; Take 1 tablet by mouth 4 times daily for 30 days.  -     HYDROcodone-acetaminophen (NORCO) 5-325 MG per tablet; Take 1 tablet by mouth every 6 hours as needed for Pain for up to 30 days. Intended supply: 30 days  -     HYDROcodone-acetaminophen (NORCO) 5-325 MG per tablet; Take 1 tablet by mouth every 6 hours as needed for Pain for up to 30 days. Intended supply: 30 days  OAR[place] reviewed and is appropriate. No signs of diversion or abuse    Anxiety disorder due to medical condition  -     LORazepam (ATIVAN) 1 MG tablet; Take 1 tablet by mouth 2 times daily for 90 days. Essential hypertension  -     losartan (COZAAR) 50 MG tablet; Take 1 tablet by mouth daily  Well controlled    Mixed hyperlipidemia  -     atorvastatin (LIPITOR) 20 MG tablet;  Take 1 tablet by mouth every morning    Type 2 diabetes mellitus with hyperglycemia, without long-term current use of insulin ([address]  Follows with Endo.  BS have been better controlled with increase in Jardiance dose    Need for pneumococcal vaccination  -     PNEUMOVAX 23 subcutaneous/IM (Pneumococcal polysaccharide vaccine 23-valent >= 1yo)        Return in about 3 months (around 11/26/2020) for Chronic pain medication refills.       Seen By:  Анна Youssef DO

## 2020-08-31 ENCOUNTER — TELEPHONE (OUTPATIENT)
Dept: PRIMARY CARE CLINIC | Age: 70
End: 2020-08-31

## 2020-08-31 RX ORDER — HYDROCODONE BITARTRATE AND ACETAMINOPHEN 7.5; 325 MG/1; MG/1
1 TABLET ORAL EVERY 6 HOURS PRN
Qty: 120 TABLET | Refills: 0 | Status: SHIPPED | OUTPATIENT
Start: 2020-08-31 | End: 2020-09-30

## 2020-08-31 RX ORDER — HYDROCODONE BITARTRATE AND ACETAMINOPHEN 7.5; 325 MG/1; MG/1
1 TABLET ORAL EVERY 6 HOURS PRN
Qty: 120 TABLET | Refills: 0 | Status: SHIPPED
Start: 2020-10-26 | End: 2020-12-02 | Stop reason: SDUPTHER

## 2020-08-31 RX ORDER — HYDROCODONE BITARTRATE AND ACETAMINOPHEN 7.5; 325 MG/1; MG/1
1 TABLET ORAL EVERY 6 HOURS PRN
Qty: 120 TABLET | Refills: 0 | Status: SHIPPED | OUTPATIENT
Start: 2020-09-28 | End: 2020-10-28

## 2020-08-31 RX ORDER — LORAZEPAM 1 MG/1
1 TABLET ORAL EVERY 8 HOURS PRN
Qty: 180 TABLET | Refills: 0 | Status: SHIPPED
Start: 2020-08-31 | End: 2020-12-16 | Stop reason: SDUPTHER

## 2020-08-31 NOTE — TELEPHONE ENCOUNTER
The pt's wife is calling because the pt's medication was not sent over to [facility] correctly. The hydrocodone was sent over as 5-325 mg but the pt says he is on 7.5-325 mg.  Also his Ativan was sent over as bid but he says it was upped to 3 times daily recently

## 2020-12-01 ENCOUNTER — TELEPHONE (OUTPATIENT)
Dept: FAMILY MEDICINE CLINIC | Age: 70
End: 2020-12-01

## 2020-12-01 NOTE — TELEPHONE ENCOUNTER
Liza Palm from [employer] called asking for a script for 6 washable bed pads, last office visit note. The diagnosis needs to be one of the follow for insurance to cover DM, CHF or kidney failure. Fax script and note to F# [phone].

## 2020-12-02 RX ORDER — HYDROCODONE BITARTRATE AND ACETAMINOPHEN 7.5; 325 MG/1; MG/1
1 TABLET ORAL EVERY 6 HOURS PRN
Qty: 120 TABLET | Refills: 0 | Status: SHIPPED | OUTPATIENT
Start: 2020-12-02 | End: 2021-01-01

## 2020-12-02 RX ORDER — HYDROCODONE BITARTRATE AND ACETAMINOPHEN 7.5; 325 MG/1; MG/1
1 TABLET ORAL EVERY 6 HOURS PRN
Qty: 120 TABLET | Refills: 0 | Status: SHIPPED
Start: 2020-12-02 | End: 2020-12-02 | Stop reason: SDUPTHER

## 2020-12-02 NOTE — TELEPHONE ENCOUNTER
[facility] sent. The wheelchair forms have been sent to [facility] numerous times at this point. Not sure where the disconnect is. Has he checked with [facility]?

## 2020-12-02 NOTE — TELEPHONE ENCOUNTER
Pt forgot about appt on 12/1 and rescheduled it on next available appt date-12/23. He needs a refill on Vicodin 750. His pharmacy is [facility]. Also, pt would like to know if form was completed regarding a wheelchair. Please contact pt.

## 2020-12-16 RX ORDER — BUMETANIDE 1 MG/1
1 TABLET ORAL 2 TIMES DAILY
Qty: 180 TABLET | Refills: 3 | Status: SHIPPED
Start: 2020-12-16 | End: 2021-04-05

## 2020-12-16 RX ORDER — LORAZEPAM 1 MG/1
1 TABLET ORAL EVERY 8 HOURS PRN
Qty: 180 TABLET | Refills: 0 | Status: SHIPPED
Start: 2020-12-16 | End: 2021-04-05 | Stop reason: SDUPTHER

## 2020-12-23 ENCOUNTER — VIRTUAL VISIT (OUTPATIENT)
Dept: PRIMARY CARE CLINIC | Age: 70
End: 2020-12-23
Payer: MEDICARE

## 2020-12-23 PROCEDURE — 99443 PR PHYS/QHP TELEPHONE EVALUATION 21-30 MIN: CPT | Performed by: FAMILY MEDICINE

## 2020-12-23 RX ORDER — METOPROLOL TARTRATE 50 MG/1
50 TABLET, FILM COATED ORAL 2 TIMES DAILY
COMMUNITY
End: 2021-08-13

## 2020-12-23 RX ORDER — LOSARTAN POTASSIUM 50 MG/1
50 TABLET ORAL DAILY
COMMUNITY
End: 2021-07-06 | Stop reason: SDUPTHER

## 2020-12-23 RX ORDER — GLIMEPIRIDE 4 MG/1
4 TABLET ORAL
COMMUNITY
End: 2021-08-13

## 2020-12-23 RX ORDER — HYDROCODONE BITARTRATE AND ACETAMINOPHEN 7.5; 325 MG/1; MG/1
1 TABLET ORAL EVERY 6 HOURS PRN
Qty: 120 TABLET | Refills: 0 | Status: SHIPPED
Start: 2021-03-04 | End: 2021-04-05 | Stop reason: SDUPTHER

## 2020-12-23 RX ORDER — INSULIN LISPRO 100 [IU]/ML
5 INJECTION, SOLUTION INTRAVENOUS; SUBCUTANEOUS 3 TIMES DAILY
COMMUNITY
End: 2021-10-08 | Stop reason: SDUPTHER

## 2020-12-23 RX ORDER — ATORVASTATIN CALCIUM 20 MG/1
20 TABLET, FILM COATED ORAL DAILY
COMMUNITY
End: 2021-08-13

## 2020-12-23 RX ORDER — HYDROCODONE BITARTRATE AND ACETAMINOPHEN 7.5; 325 MG/1; MG/1
1 TABLET ORAL EVERY 6 HOURS PRN
Qty: 120 TABLET | Refills: 0 | Status: SHIPPED
Start: 2021-01-07 | End: 2021-04-05 | Stop reason: SDUPTHER

## 2020-12-23 RX ORDER — HYDROCODONE BITARTRATE AND ACETAMINOPHEN 7.5; 325 MG/1; MG/1
1 TABLET ORAL EVERY 6 HOURS PRN
Qty: 120 TABLET | Refills: 0 | Status: SHIPPED
Start: 2021-02-04 | End: 2021-04-05 | Stop reason: SDUPTHER

## 2020-12-23 ASSESSMENT — ENCOUNTER SYMPTOMS
NAUSEA: 0
BACK PAIN: 1
DIARRHEA: 0
COLOR CHANGE: 1
SHORTNESS OF BREATH: 0
WHEEZING: 0
CONSTIPATION: 0
VOMITING: 0
COUGH: 0
ABDOMINAL PAIN: 0

## 2020-12-23 NOTE — PROGRESS NOTES
20  Markos Pichardo   : 1950 Sex: male  Age: 78 y.o. Chief Complaint   Patient presents with    Diabetes    Back Pain     med refills       Markos Pichardo is a 78 y.o. male evaluated via telephone on 2020. Consent:  He and/or health care decision maker is aware that that he may receive a bill for this telephone service, depending on his insurance coverage, and has provided verbal consent to proceed: Yes      Documentation:  I communicated with the patient and/or health care decision maker about chronic medical conditiosn. Details of this discussion including any medical advice provided: See jeffrey      I affirm this is a Patient Initiated Episode with a Patient who has not had a related appointment within my department in the past 7 days or scheduled within the next 24 hours. Patient identification was verified at the start of the visit: Yes    Total Time: minutes: 21-30 minutes      HPI:  78 y.o. male patient presents today for 4 month(s) follow up of chronic medical conditions, medication refills  Patient's chart, medical, surgical and medication history all reviewed. Diabetes Mellitus  78 y.o. male presents for follow up of type 2 diabetes. Current diabetic medications include: oral agents (dual therapy): glimepiride (Amaryl), Jardiance, insulin injections: SSI prn for BS >200 and Bydureon. Previous medications tried include: oral agents (triple therapy): metformin (generic), pioglitazone (Actos), sitagliptin (Januvia) and insulin injections: Lantus : nightly, but failed due to various reasons. Most recent HgA1c was 6.7% (2020)--- 6.5% (2020)---7.4% (2019). His most recent TSH was 1.15. LDL is 60. Renal function is reduced at GFR= 55. The patient has evidence of end organ damage including nephropathy, peripheral neuropathy and peripheral vascular disease. He does not see a Podiatrist for foot care . Last eye exam was unknown. Hypertension   The patient presents today for follow up of HTN. The problem is Cannot determine via phone visit. Risk factors for coronary artery disease include Age > 27, male, HTN, diabetes and elevated cholesterol. Current treatments include bumetanide (Bumex), losartan (Cozaar), metolazone (Zaroxolyn) and metoprolol (Lopressor, Toprol). The patient is compliant all of the time. Lifestyle changes the patient has made include none. Today the patient is complaining of peripheral edema. He does wear compression stockings. Hyperlipidemia  The 10-year ASCVD risk score (Jose Mancera et al., 2013) is: 28%    Values used to calculate the score:      Age: 78 years      Sex: Male      Is Non- : No      Diabetic: Yes      Tobacco smoker: No      Systolic Blood Pressure: 625 mmHg      Is BP treated: Yes      HDL Cholesterol: 36 mg/dL      Total Cholesterol: 132 mg/dL    Back pain  Patient has a history of ongoing lumbar back pain. Significant neurologic dysfunction due to back issues. Unable to walk due to the weakness of lower extremities- wheelchair bound. Having even more weakness lately. ROS:  Review of Systems   Constitutional: Negative for chills, fatigue and fever. Respiratory: Negative for cough, shortness of breath and wheezing. Cardiovascular: Positive for leg swelling. Negative for chest pain and palpitations. Gastrointestinal: Negative for abdominal pain, constipation, diarrhea, nausea and vomiting. Musculoskeletal: Positive for arthralgias, back pain and gait problem. Skin: Positive for color change. Negative for rash. Neurological: Positive for weakness and numbness. Negative for dizziness and headaches. Psychiatric/Behavioral: Negative for dysphoric mood. The patient is not nervous/anxious. All other systems reviewed and are negative.        Current Outpatient Medications on File Prior to Visit   Medication Sig Dispense Refill    atorvastatin (LIPITOR) 20 MG tablet Take 20 mg by mouth daily      losartan (COZAAR) 50 MG tablet Take 50 mg by mouth daily      metoprolol tartrate (LOPRESSOR) 50 MG tablet Take 50 mg by mouth 2 times daily      glimepiride (AMARYL) 4 MG tablet Take 4 mg by mouth every morning (before breakfast)      insulin lispro, 1 Unit Dial, (HUMALOG KWIKPEN) 100 UNIT/ML SOPN Inject 5 Units into the skin 3 times daily      bumetanide (BUMEX) 1 MG tablet Take 1 tablet by mouth 2 times daily 180 tablet 3    LORazepam (ATIVAN) 1 MG tablet Take 1 tablet by mouth every 8 hours as needed for Anxiety for up to 90 days. 180 tablet 0    HYDROcodone-acetaminophen (NORCO) 7.5-325 MG per tablet Take 1 tablet by mouth every 6 hours as needed for Pain for up to 30 days. Intended supply: 30 days 120 tablet 0    JARDIANCE 25 MG tablet Take 25 mg by mouth Daily      metOLazone (ZAROXOLYN) 2.5 MG tablet Take 1 tablet by mouth three times a week 36 tablet 1    albuterol sulfate  (90 Base) MCG/ACT inhaler Inhale 2 puffs into the lungs      Exenatide ER (BYDUREON BCISE) 2 MG/0.85ML AUIJ Bydureon BCise 2 mg/0.85 mL subcutaneous auto-injector   ADM 0.85 ML SC Q WK      Ferrous Sulfate (IRON) 325 (65 Fe) MG TABS Take 325 mg by mouth every other day 90 tablet 3    fluticasone (FLONASE) 50 MCG/ACT nasal spray 1 spray by Each Nostril route daily 3 Bottle 3    potassium chloride (KLOR-CON M) 20 MEQ TBCR extended release tablet TK 1 T PO  tablet 3    Ascorbic Acid (VITAMIN C) 250 MG tablet Take 250 mg by mouth daily      Insulin Pen Needle (PEN NEEDLES) 31G X 5 MM MISC by Does not apply route 2 times daily       No current facility-administered medications on file prior to visit.         Allergies   Allergen Reactions    Cleocin [Clindamycin Hcl]     Codeine     Eucerin [Albolene]     Glucophage [Metformin Hcl]     Januvia [Sitagliptin]     Lisinopril     Zithromax [Azithromycin]     Ciprofloxacin Hcl Swelling and Rash    Keflex on file     Attends Confucianist service: Not on file     Active member of club or organization: Not on file     Attends meetings of clubs or organizations: Not on file     Relationship status: Not on file    Intimate partner violence     Fear of current or ex partner: Not on file     Emotionally abused: Not on file     Physically abused: Not on file     Forced sexual activity: Not on file   Other Topics Concern    Not on file   Social History Narrative    Not on file     There were no vitals filed for this visit. Physical Exam:  Unable to determine over phone visit. Does not sound like he is in any acute distress.     Labs:  CBC with Differential:    Lab Results   Component Value Date    WBC 7.7 02/16/2020    RBC 3.03 02/16/2020    HGB 8.1 02/16/2020    HCT 25.6 02/16/2020     02/16/2020    MCV 84.4 02/16/2020    MCH 26.6 02/16/2020    MCHC 31.5 02/16/2020    RDW 19.0 02/16/2020    SEGSPCT 71.4 02/16/2020    LYMPHOPCT 15.8 02/16/2020    MONOPCT 8.9 02/16/2020    BASOPCT 0.6 02/16/2020    MONOSABS 0.7 02/16/2020    LYMPHSABS 1.2 02/16/2020    EOSABS 0.3 02/16/2020    BASOSABS 0.0 02/16/2020     CMP:    Lab Results   Component Value Date     06/12/2020    K 3.5 06/12/2020    CL 96 06/12/2020    CO2 34 06/12/2020    BUN 40 06/12/2020    CREATININE 1.3 06/12/2020    GFRAA 117 02/18/2020    AGRATIO 0.8 02/18/2020    LABGLOM 55 06/12/2020    LABGLOM 51 03/03/2020    GLUCOSE 114 06/12/2020    PROT 6.2 02/18/2020    LABALBU 3.5 06/12/2020    CALCIUM 9.0 06/12/2020    BILITOT 0.7 06/12/2020    ALKPHOS 98 06/12/2020    AST 18 06/12/2020    ALT 19 06/12/2020     HgBA1c:    Lab Results   Component Value Date    LABA1C 6.7 06/12/2020     Microalbumen/Creatinine ratio:  No components found for: RUCREAT  FLP:    Lab Results   Component Value Date    TRIG 127 08/26/2019    HDL 36 08/26/2019    LDLCALC 71 08/26/2019    LABVLDL 25 08/26/2019     TSH:    Lab Results   Component Value Date    TSH 0.28 02/09/2020 PSA:   Lab Results   Component Value Date    PSA 2.53 08/26/2019        Assessment and Plan:  Guillermina Boateng was seen today for diabetes and back pain. Diagnoses and all orders for this visit:    Lumbar disc disease with radiculopathy  -     HYDROcodone-acetaminophen (NORCO) 7.5-325 MG per tablet; Take 1 tablet by mouth every 6 hours as needed for Pain for up to 30 days. Intended supply: 30 days  -     HYDROcodone-acetaminophen (NORCO) 7.5-325 MG per tablet; Take 1 tablet by mouth every 6 hours as needed for Pain for up to 30 days. Intended supply: 30 days  -     HYDROcodone-acetaminophen (NORCO) 7.5-325 MG per tablet; Take 1 tablet by mouth every 6 hours as needed for Pain for up to 30 days. Intended supply: 30 days  OARRS reviewed. Just filled last Rx on 12/10. WIll post date next three scripts. Type 2 diabetes mellitus with hyperglycemia, without long-term current use of insulin (HCC)  -     CBC Auto Differential; Future  -     Comprehensive Metabolic Panel; Future  -     Hemoglobin A1C; Future  -     Lipid Panel; Future  -     TSH without Reflex; Future  -     Microalbumin / Creatinine Urine Ratio; Future  Overdue for fasting labs. Will fax to [facility]    Benign essential hypertension  -     CBC Auto Differential; Future  -     Comprehensive Metabolic Panel; Future  -     Lipid Panel; Future  -     TSH without Reflex; Future  -     Microalbumin / Creatinine Urine Ratio; Future    Vitamin D deficiency  -     Vitamin D 25 Hydroxy; Future    Screening for prostate cancer  -     Psa screening; Future          Return in about 4 months (around 4/23/2021) for Chronic pain medication refills.       Seen By:  Camron Llanes,

## 2021-02-03 RX ORDER — METOLAZONE 2.5 MG/1
2.5 TABLET ORAL
Qty: 36 TABLET | Refills: 1 | Status: SHIPPED
Start: 2021-02-03 | End: 2021-11-08

## 2021-02-09 DIAGNOSIS — M51.16 LUMBAR DISC DISEASE WITH RADICULOPATHY: ICD-10-CM

## 2021-02-09 RX ORDER — HYDROCODONE BITARTRATE AND ACETAMINOPHEN 7.5; 325 MG/1; MG/1
1 TABLET ORAL EVERY 6 HOURS PRN
Qty: 120 TABLET | Refills: 0 | OUTPATIENT
Start: 2021-03-04 | End: 2021-04-03

## 2021-02-18 ENCOUNTER — TELEPHONE (OUTPATIENT)
Dept: FAMILY MEDICINE CLINIC | Age: 71
End: 2021-02-18

## 2021-02-18 NOTE — TELEPHONE ENCOUNTER
MIKE... Bigg Kumar from [employer] called to let you know that pt had his annual passport enrollment and is good from March 1, 2021-February 28, 2022.

## 2021-03-16 LAB
ALBUMIN SERPL-MCNC: 3.6 G/DL
ALP BLD-CCNC: 92 U/L
ALT SERPL-CCNC: 28 U/L
ANION GAP SERPL CALCULATED.3IONS-SCNC: 12 MMOL/L
AST SERPL-CCNC: 22 U/L
AVERAGE GLUCOSE: NORMAL
BASOPHILS ABSOLUTE: 0 /ΜL
BASOPHILS RELATIVE PERCENT: 0.5 %
BILIRUB SERPL-MCNC: 0.8 MG/DL (ref 0.1–1.4)
BUN BLDV-MCNC: 28 MG/DL
CALCIUM SERPL-MCNC: 9.2 MG/DL
CHLORIDE BLD-SCNC: 91 MMOL/L
CHOLESTEROL, TOTAL: 134 MG/DL
CHOLESTEROL/HDL RATIO: 2
CO2: 33 MMOL/L
CREAT SERPL-MCNC: 1.1 MG/DL
CREATININE, URINE: 52.2
EOSINOPHILS ABSOLUTE: 0.2 /ΜL
EOSINOPHILS RELATIVE PERCENT: 1.9 %
GFR CALCULATED: 67
GLUCOSE BLD-MCNC: 210 MG/DL
HBA1C MFR BLD: 9.8 %
HCT VFR BLD CALC: 47.4 % (ref 41–53)
HDLC SERPL-MCNC: 36 MG/DL (ref 35–70)
HEMOGLOBIN: 15.3 G/DL (ref 13.5–17.5)
LDL CHOLESTEROL CALCULATED: 73 MG/DL (ref 0–160)
LYMPHOCYTES ABSOLUTE: 1.5 /ΜL
LYMPHOCYTES RELATIVE PERCENT: 16.9 %
MCH RBC QN AUTO: 27.2 PG
MCHC RBC AUTO-ENTMCNC: 32.3 G/DL
MCV RBC AUTO: 84.1 FL
MICROALBUMIN/CREAT 24H UR: 2 MG/G{CREAT}
MICROALBUMIN/CREAT UR-RTO: NORMAL
MONOCYTES ABSOLUTE: 0.7 /ΜL
MONOCYTES RELATIVE PERCENT: 7.9 %
NEUTROPHILS ABSOLUTE: 6.6 /ΜL
NEUTROPHILS RELATIVE PERCENT: 72.8 %
NONHDLC SERPL-MCNC: NORMAL MG/DL
PDW BLD-RTO: 16.8 %
PLATELET # BLD: 285 K/ΜL
PMV BLD AUTO: 8.5 FL
POTASSIUM SERPL-SCNC: 3.5 MMOL/L
PROSTATE SPECIFIC ANTIGEN: 3.37 NG/ML
RBC # BLD: 5.64 10^6/ΜL
SODIUM BLD-SCNC: 16 MMOL/L
TOTAL PROTEIN: 7.5
TRIGL SERPL-MCNC: 137 MG/DL
TSH SERPL DL<=0.05 MIU/L-ACNC: 1.14 UIU/ML
VITAMIN D 25-HYDROXY: 56.2
VITAMIN D2, 25 HYDROXY: NORMAL
VITAMIN D3,25 HYDROXY: NORMAL
VLDLC SERPL CALC-MCNC: NORMAL MG/DL
WBC # BLD: 9 10^3/ML

## 2021-04-05 ENCOUNTER — VIRTUAL VISIT (OUTPATIENT)
Dept: PRIMARY CARE CLINIC | Age: 71
End: 2021-04-05
Payer: MEDICARE

## 2021-04-05 DIAGNOSIS — E11.65 TYPE 2 DIABETES MELLITUS WITH HYPERGLYCEMIA, WITHOUT LONG-TERM CURRENT USE OF INSULIN (HCC): ICD-10-CM

## 2021-04-05 DIAGNOSIS — J31.0 CHRONIC RHINITIS: ICD-10-CM

## 2021-04-05 DIAGNOSIS — E66.01 MORBID OBESITY (HCC): ICD-10-CM

## 2021-04-05 DIAGNOSIS — I10 BENIGN ESSENTIAL HYPERTENSION: ICD-10-CM

## 2021-04-05 DIAGNOSIS — E55.9 VITAMIN D DEFICIENCY: ICD-10-CM

## 2021-04-05 DIAGNOSIS — Z12.5 SCREENING FOR PROSTATE CANCER: ICD-10-CM

## 2021-04-05 DIAGNOSIS — F06.4 ANXIETY DISORDER DUE TO MEDICAL CONDITION: ICD-10-CM

## 2021-04-05 DIAGNOSIS — E78.2 MIXED HYPERLIPIDEMIA: ICD-10-CM

## 2021-04-05 DIAGNOSIS — Z85.038 HISTORY OF COLON CANCER: ICD-10-CM

## 2021-04-05 DIAGNOSIS — M51.16 LUMBAR DISC DISEASE WITH RADICULOPATHY: ICD-10-CM

## 2021-04-05 DIAGNOSIS — E11.65 TYPE 2 DIABETES MELLITUS WITH HYPERGLYCEMIA, WITHOUT LONG-TERM CURRENT USE OF INSULIN (HCC): Primary | ICD-10-CM

## 2021-04-05 DIAGNOSIS — I48.91 ATRIAL FIBRILLATION WITH CONTROLLED VENTRICULAR RESPONSE (HCC): ICD-10-CM

## 2021-04-05 DIAGNOSIS — I50.22 CHRONIC SYSTOLIC CONGESTIVE HEART FAILURE (HCC): ICD-10-CM

## 2021-04-05 PROCEDURE — 99214 OFFICE O/P EST MOD 30 MIN: CPT | Performed by: FAMILY MEDICINE

## 2021-04-05 RX ORDER — PANTOPRAZOLE SODIUM 40 MG/1
TABLET, DELAYED RELEASE ORAL
COMMUNITY
End: 2021-07-06

## 2021-04-05 RX ORDER — LORAZEPAM 1 MG/1
TABLET ORAL
COMMUNITY
End: 2021-07-06 | Stop reason: SDUPTHER

## 2021-04-05 RX ORDER — ALBUTEROL SULFATE 90 UG/1
2 AEROSOL, METERED RESPIRATORY (INHALATION)
Qty: 1 INHALER | Status: CANCELLED | OUTPATIENT
Start: 2021-04-05

## 2021-04-05 RX ORDER — SEMAGLUTIDE 1.34 MG/ML
INJECTION, SOLUTION SUBCUTANEOUS
COMMUNITY
End: 2021-07-06

## 2021-04-05 RX ORDER — HYDROCODONE BITARTRATE AND ACETAMINOPHEN 7.5; 325 MG/1; MG/1
1 TABLET ORAL EVERY 6 HOURS PRN
Qty: 120 TABLET | Refills: 0 | Status: SHIPPED
Start: 2021-04-05 | End: 2021-07-01 | Stop reason: SDUPTHER

## 2021-04-05 RX ORDER — FLASH GLUCOSE SENSOR
KIT MISCELLANEOUS
Qty: 2 EACH | Refills: 5 | Status: SHIPPED
Start: 2021-04-05 | End: 2021-07-06

## 2021-04-05 RX ORDER — HYDROCODONE BITARTRATE AND ACETAMINOPHEN 5; 325 MG/1; MG/1
TABLET ORAL
COMMUNITY
End: 2021-07-06

## 2021-04-05 RX ORDER — BUMETANIDE 1 MG/1
1 TABLET ORAL 2 TIMES DAILY
COMMUNITY
End: 2021-11-23

## 2021-04-05 RX ORDER — HYDROCODONE BITARTRATE AND ACETAMINOPHEN 7.5; 325 MG/1; MG/1
1 TABLET ORAL EVERY 6 HOURS PRN
Qty: 120 TABLET | Refills: 0 | Status: SHIPPED
Start: 2021-05-03 | End: 2021-07-06 | Stop reason: SDUPTHER

## 2021-04-05 RX ORDER — LORAZEPAM 1 MG/1
1 TABLET ORAL EVERY 8 HOURS PRN
Qty: 180 TABLET | Refills: 0 | Status: SHIPPED
Start: 2021-04-05 | End: 2021-07-04

## 2021-04-05 RX ORDER — FLUTICASONE PROPIONATE 50 MCG
1 SPRAY, SUSPENSION (ML) NASAL DAILY
Qty: 3 BOTTLE | Refills: 3 | Status: SHIPPED
Start: 2021-04-05 | End: 2021-07-06 | Stop reason: SDUPTHER

## 2021-04-05 RX ORDER — FLASH GLUCOSE SCANNING READER
EACH MISCELLANEOUS
Qty: 1 EACH | Refills: 5 | Status: SHIPPED
Start: 2021-04-05 | End: 2021-07-06 | Stop reason: SDUPTHER

## 2021-04-05 RX ORDER — INSULIN GLARGINE 300 U/ML
INJECTION, SOLUTION SUBCUTANEOUS
COMMUNITY
End: 2021-10-08 | Stop reason: SDUPTHER

## 2021-04-05 RX ORDER — ALBUTEROL SULFATE 90 UG/1
2 AEROSOL, METERED RESPIRATORY (INHALATION) 4 TIMES DAILY PRN
Qty: 1 INHALER | Refills: 3 | Status: SHIPPED
Start: 2021-04-05 | End: 2021-07-06 | Stop reason: SDUPTHER

## 2021-04-05 RX ORDER — HYDROCODONE BITARTRATE AND ACETAMINOPHEN 7.5; 325 MG/1; MG/1
1 TABLET ORAL EVERY 6 HOURS PRN
Qty: 120 TABLET | Refills: 0 | Status: SHIPPED
Start: 2021-05-31 | End: 2021-07-06 | Stop reason: SDUPTHER

## 2021-04-05 ASSESSMENT — PATIENT HEALTH QUESTIONNAIRE - PHQ9
SUM OF ALL RESPONSES TO PHQ QUESTIONS 1-9: 0
2. FEELING DOWN, DEPRESSED OR HOPELESS: 0
1. LITTLE INTEREST OR PLEASURE IN DOING THINGS: 0
SUM OF ALL RESPONSES TO PHQ9 QUESTIONS 1 & 2: 0

## 2021-04-05 ASSESSMENT — ENCOUNTER SYMPTOMS
COUGH: 0
CONSTIPATION: 0
SHORTNESS OF BREATH: 0
VOMITING: 0
DIARRHEA: 0
NAUSEA: 0
BACK PAIN: 1
COLOR CHANGE: 1
ABDOMINAL PAIN: 0
WHEEZING: 0

## 2021-04-05 NOTE — PROGRESS NOTES
21  Natasha Magallon   : 1950 Sex: male  Age: 78 y.o. Chief Complaint   Patient presents with    Medication Refill    Back Pain     HPI:  78 y.o. male patient presents today for 4 month(s) follow up of chronic medical conditions, medication refills and FBW results. Patient's chart, medical, surgical and medication history all reviewed. TeleMedicine Patient Consent    This visit was performed as a virtual video visit using a synchronous, two-way, audio-video telehealth technology platform. Patient identification was verified at the start of the visit, including the patient's telephone number and physical location. I discussed with the patient the nature of our telehealth visits, that:     1. Due to the nature of an audio- video modality, the only components of a physical exam that could be done are the elements supported by direct observation. 2. I would evaluate the patient and recommend diagnostics and treatments based on my assessment. 3. If it was felt that the patient should be evaluated in clinic or an emergency room setting, then they would be directed there. 4. Our sessions are not being recorded and that personal health information is protected. 5. Our team would provide follow up care in person if/when the patient needs it. Patient does agree to proceed with telemedicine consultation. Patient's location: home address in [facility]. Physician location: home address in [facility]. Other people involved in call:  Jose D Sanderson LPN. Time spent: Greater than Not billed by time    This visit was completed virtually using Doxy. me      Diabetes Mellitus  78 y.o. male presents for follow up of type 2 diabetes. Current diabetic medications include: oral agents (dual therapy): glimepiride (Amaryl), Jardiance, insulin injections: Toujeo and SSI prn for BS >200 and Ozempic. Patient seen by Dr. Kelsey Fraser and had Bydureon changed to Ozempic and added Toujeo.   Previous medications tried include: oral agents (triple therapy): metformin (generic), pioglitazone (Actos), sitagliptin (Januvia) and insulin injections: Lantus : nightly, but failed due to various reasons. Most recent HgA1c was 9.8% (March 2021)---6.7% (June 2020)--- 6.5% (February 2020)---7.4% (August 2019). His most recent TSH was 1. 14. LDL is 73. Renal function is improved. The patient has evidence of end organ damage including nephropathy, peripheral neuropathy and peripheral vascular disease. He does not see a Podiatrist for foot care . Last eye exam was unknown. Hypertension   The patient presents today for follow up of HTN. The problem is Cannot determine via phone visit. Risk factors for coronary artery disease include Age > 27, male, HTN, diabetes and elevated cholesterol. Current treatments include bumetanide (Bumex), losartan (Cozaar), metolazone (Zaroxolyn) and metoprolol (Lopressor, Toprol). The patient is compliant all of the time. Lifestyle changes the patient has made include none. Today the patient is complaining of peripheral edema. He does wear compression stockings. Hyperlipidemia  The 10-year ASCVD risk score (Media Pile., et al., 2013) is: 28.3%    Values used to calculate the score:      Age: 78 years      Sex: Male      Is Non- : No      Diabetic: Yes      Tobacco smoker: No      Systolic Blood Pressure: 710 mmHg      Is BP treated: Yes      HDL Cholesterol: 36 mg/dL      Total Cholesterol: 134 mg/dL    Back pain  Patient has a history of ongoing lumbar back pain. Significant neurologic dysfunction due to back issues. Unable to walk due to the weakness of lower extremities- wheelchair bound. Having even more weakness lately. ROS:  Review of Systems   Constitutional: Negative for chills, fatigue and fever. Respiratory: Negative for cough, shortness of breath and wheezing. Cardiovascular: Positive for leg swelling. Negative for chest pain and palpitations. Gastrointestinal: Negative for abdominal pain, constipation, diarrhea, nausea and vomiting. Musculoskeletal: Positive for arthralgias, back pain and gait problem. Skin: Positive for color change. Negative for rash. Neurological: Positive for weakness and numbness. Negative for dizziness and headaches. Psychiatric/Behavioral: Negative for dysphoric mood. The patient is not nervous/anxious. All other systems reviewed and are negative.        Current Outpatient Medications on File Prior to Visit   Medication Sig Dispense Refill    bumetanide (BUMEX) 1 MG tablet Take 1 mg by mouth 2 times daily      Semaglutide,0.25 or 0.5MG/DOS, (OZEMPIC, 0.25 OR 0.5 MG/DOSE,) 2 MG/1.5ML SOPN Ozempic 0.25 mg or 0.5 mg (2 mg/1.5 mL) subcutaneous pen injector   INJECT 0.5MG UNDER THE SKIN ONCE WEEKLY      LORazepam (ATIVAN) 1 MG tablet lorazepam 1 mg tablet   TAKE 1 TABLET BY MOUTH TWICE DAILY      pantoprazole (PROTONIX) 40 MG tablet pantoprazole 40 mg tablet,delayed release   TK 1 T PO D      Insulin Glargine, 2 Unit Dial, (TOUJEO MAX SOLOSTAR) 300 UNIT/ML SOPN Toujeo Max U-300 SoloStar 300 unit/mL (3 mL) subcutaneous insulin pen   INJECT A MAX OF 50 UNITS UNDER THE SKIN DAILY      HYDROcodone-acetaminophen (NORCO) 5-325 MG per tablet hydrocodone 5 mg-acetaminophen 325 mg tablet   TK 1 T PO  Q 6 H PRN FOR PAIN      metOLazone (ZAROXOLYN) 2.5 MG tablet Take 1 tablet by mouth three times a week 36 tablet 1    atorvastatin (LIPITOR) 20 MG tablet Take 20 mg by mouth daily      losartan (COZAAR) 50 MG tablet Take 50 mg by mouth daily      metoprolol tartrate (LOPRESSOR) 50 MG tablet Take 50 mg by mouth 2 times daily      glimepiride (AMARYL) 4 MG tablet Take 4 mg by mouth every morning (before breakfast)      insulin lispro, 1 Unit Dial, (HUMALOG KWIKPEN) 100 UNIT/ML SOPN Inject 5 Units into the skin 3 times daily      JARDIANCE 25 MG tablet Take 25 mg by mouth Daily      albuterol sulfate  (90 Base) MCG/ACT inhaler Inhale 2 puffs into the lungs      Ferrous Sulfate (IRON) 325 (65 Fe) MG TABS Take 325 mg by mouth every other day 90 tablet 3    potassium chloride (KLOR-CON M) 20 MEQ TBCR extended release tablet TK 1 T PO  tablet 3    Ascorbic Acid (VITAMIN C) 250 MG tablet Take 250 mg by mouth daily      Insulin Pen Needle (PEN NEEDLES) 31G X 5 MM MISC by Does not apply route 2 times daily       No current facility-administered medications on file prior to visit.         Allergies   Allergen Reactions    Cleocin [Clindamycin Hcl]     Codeine     Eucerin [Albolene]     Glucophage [Metformin Hcl]     Januvia [Sitagliptin]     Lisinopril     Zithromax [Azithromycin]     Ciprofloxacin Hcl Swelling and Rash    Keflex [Cephalexin] Swelling and Rash       Past Medical History:   Diagnosis Date    DDD (degenerative disc disease), cervical     DDD (degenerative disc disease), thoracic     Diabetes mellitus ([address]     Follicular adenocarcinoma, moderately differentiated ([address]     rectum     Hyperlipidemia     Hypertension     Obesity     morbid     NICOLLE (obstructive sleep apnea)     Peripheral edema     Stasis dermatitis     Thyroid nodule     Vitamin D insufficiency      Past Surgical History:   Procedure Laterality Date    CERVICAL FUSION      COLONOSCOPY      EYE SURGERY      cataract with lol implants - OU    HERNIA REPAIR      umbilical     RECTAL SURGERY      transanal excision rectal cancer     SIGMOIDOSCOPY      procotoscopy     UPPER GASTROINTESTINAL ENDOSCOPY  06/15/2020    Radha- erosive gastritis     Family History   Problem Relation Age of Onset    Cancer Mother         breast     Diabetes Father     Diabetes Brother     Heart Attack Brother     Other Brother         valvular heart disease    Cancer Brother         lung      Social History     Socioeconomic History    Marital status:      Spouse name: Not on file    Number of children: Not on file    Years of education: Not on file    Highest education level: Not on file   Occupational History    Not on file   Social Needs    Financial resource strain: Not on file    Food insecurity     Worry: Not on file     Inability: Not on file    Transportation needs     Medical: Not on file     Non-medical: Not on file   Tobacco Use    Smoking status: Never Smoker    Smokeless tobacco: Never Used   Substance and Sexual Activity    Alcohol use: Never     Frequency: Never    Drug use: Never    Sexual activity: Not on file   Lifestyle    Physical activity     Days per week: Not on file     Minutes per session: Not on file    Stress: Not on file   Relationships    Social connections     Talks on phone: Not on file     Gets together: Not on file     Attends Yarsani service: Not on file     Active member of club or organization: Not on file     Attends meetings of clubs or organizations: Not on file     Relationship status: Not on file    Intimate partner violence     Fear of current or ex partner: Not on file     Emotionally abused: Not on file     Physically abused: Not on file     Forced sexual activity: Not on file   Other Topics Concern    Not on file   Social History Narrative    Not on file     There were no vitals filed for this visit. Physical Exam:  Physical Exam  Vitals signs and nursing note reviewed. Constitutional:       General: He is not in acute distress. Appearance: Normal appearance. He is well-developed. He is morbidly obese. He is not ill-appearing. HENT:      Head: Normocephalic and atraumatic. Right Ear: Hearing and external ear normal.      Left Ear: Hearing and external ear normal.      Nose: Nose normal.   Eyes:      General: Lids are normal. No scleral icterus. Extraocular Movements: Extraocular movements intact. Conjunctiva/sclera: Conjunctivae normal.   Neck:      Musculoskeletal: Normal range of motion.    Pulmonary:      Effort: Pulmonary effort is normal. No respiratory distress. Breath sounds: No wheezing. Musculoskeletal: Normal range of motion. Skin:     Findings: No rash. Neurological:      Mental Status: He is alert and oriented to person, place, and time. Psychiatric:         Mood and Affect: Mood and affect normal.         Speech: Speech normal.         Behavior: Behavior normal.         Thought Content: Thought content normal.           Labs:  CBC with Differential:    Lab Results   Component Value Date    WBC 9.0 03/16/2021    RBC 5.64 03/16/2021    HGB 15.3 03/16/2021    HCT 47.4 03/16/2021     03/16/2021    MCV 84.1 03/16/2021    MCH 27.2 03/16/2021    MCHC 32.3 03/16/2021    RDW 16.8 03/16/2021    SEGSPCT 71.4 02/16/2020    LYMPHOPCT 16.9 03/16/2021    MONOPCT 7.9 03/16/2021    EOSPCT 1.9 03/16/2021    BASOPCT 0.5 03/16/2021    MONOSABS 0.7 03/16/2021    LYMPHSABS 1.5 03/16/2021    EOSABS 0.2 03/16/2021    BASOSABS 0.0 03/16/2021     CMP:    Lab Results   Component Value Date    NA 16 03/16/2021    K 3.5 03/16/2021    CL 91 03/16/2021    CO2 33 03/16/2021    BUN 28 03/16/2021    CREATININE 1.1 03/16/2021    GFRAA 117 02/18/2020    AGRATIO 0.8 02/18/2020    LABGLOM 67 03/16/2021    LABGLOM 51 03/03/2020    GLUCOSE 210 03/16/2021    PROT 6.2 02/18/2020    LABALBU 3.6 03/16/2021    CALCIUM 9.2 03/16/2021    BILITOT 0.8 03/16/2021    ALKPHOS 92 03/16/2021    AST 22 03/16/2021    ALT 28 03/16/2021     HgBA1c:    Lab Results   Component Value Date    LABA1C 9.8 03/16/2021     Microalbumen/Creatinine ratio:  No components found for: RUCREAT  FLP:    Lab Results   Component Value Date    TRIG 137 03/16/2021    HDL 36 03/16/2021    LDLCALC 73 03/16/2021    LABVLDL 25 08/26/2019     TSH:    Lab Results   Component Value Date    TSH 1.14 03/16/2021     PSA:   Lab Results   Component Value Date    PSA 3.37 03/16/2021        Assessment and Plan:  Janice Hahn was seen today for medication refill and back pain.     Diagnoses and all orders for this visit:    Type 2 diabetes mellitus with hyperglycemia, without long-term current use of insulin (HCC)  -     Continuous Blood Gluc Sensor (FREESTYLE MEDHAT 14 DAY SENSOR) MISC; Use device to check BS 3-4x/day and PRN for abnormal BS  -     Continuous Blood Gluc  (FREESTYLE MEDHAT 2 READER) GERALD; Use device to check BS 3-4x/day and PRN for abnormal BS  Loss of control. Patient has noticed increased urination and fatigue. Ozempic and Toujeo added. Requesting continuous monitor due to frequent BS checks. Recheck labs with Dr. Soni Robles. Benign essential hypertension  Unable to determine at this time. Atrial fibrillation with controlled ventricular response (HCC)  Stable per patient. Chronic systolic congestive heart failure (HCC)  -     albuterol sulfate  (90 Base) MCG/ACT inhaler; Inhale 2 puffs into the lungs 4 times daily as needed (2 puffs)    Mixed hyperlipidemia  Well controlled    Lumbar disc disease with radiculopathy  -     HYDROcodone-acetaminophen (NORCO) 7.5-325 MG per tablet; Take 1 tablet by mouth every 6 hours as needed for Pain for up to 30 days. Intended supply: 30 days  -     HYDROcodone-acetaminophen (NORCO) 7.5-325 MG per tablet; Take 1 tablet by mouth every 6 hours as needed for Pain for up to 30 days. Intended supply: 30 days  -     HYDROcodone-acetaminophen (NORCO) 7.5-325 MG per tablet; Take 1 tablet by mouth every 6 hours as needed for Pain for up to 30 days. Intended supply: 30 days  OARRS appropriate    Chronic rhinitis  -     fluticasone (FLONASE) 50 MCG/ACT nasal spray; 1 spray by Each Nostril route daily    Anxiety disorder due to medical condition  -     LORazepam (ATIVAN) 1 MG tablet; Take 1 tablet by mouth every 8 hours as needed for Anxiety for up to 90 days. History of colon cancer  -     Amb External Referral To General Surgery  History of colon cancer that has been removed. No repeat colonoscopy recently. Will send new referral to gen surg.      Morbid obesity ([facility] [address]        Return in about 3 months (around 7/5/2021) for AWV, Chronic pain medication refills.       Seen By:  Satinder Méndez DO

## 2021-04-19 ENCOUNTER — TELEPHONE (OUTPATIENT)
Dept: PRIMARY CARE CLINIC | Age: 71
End: 2021-04-19

## 2021-04-19 RX ORDER — DOXYCYCLINE HYCLATE 100 MG
100 TABLET ORAL 2 TIMES DAILY
Qty: 14 TABLET | Refills: 0 | Status: SHIPPED | OUTPATIENT
Start: 2021-04-19 | End: 2021-04-26

## 2021-04-19 NOTE — TELEPHONE ENCOUNTER
Pt c/o sinus congestion. He has been taking mucinex but it is not working. Wife wants to know if you can send in an antibiotic for him.

## 2021-05-03 DIAGNOSIS — E11.65 TYPE 2 DIABETES MELLITUS WITH HYPERGLYCEMIA, WITHOUT LONG-TERM CURRENT USE OF INSULIN (HCC): Primary | ICD-10-CM

## 2021-05-03 RX ORDER — PEN NEEDLE, DIABETIC 30 GX3/16"
1 NEEDLE, DISPOSABLE MISCELLANEOUS 2 TIMES DAILY
Qty: 200 EACH | Refills: 2 | Status: SHIPPED
Start: 2021-05-03 | End: 2022-01-18

## 2021-05-18 ENCOUNTER — OFFICE VISIT (OUTPATIENT)
Dept: PRIMARY CARE CLINIC | Age: 71
End: 2021-05-18
Payer: MEDICARE

## 2021-05-18 VITALS
TEMPERATURE: 97.6 F | HEIGHT: 71 IN | BODY MASS INDEX: 44.1 KG/M2 | HEART RATE: 87 BPM | SYSTOLIC BLOOD PRESSURE: 142 MMHG | WEIGHT: 315 LBS | OXYGEN SATURATION: 93 % | DIASTOLIC BLOOD PRESSURE: 82 MMHG

## 2021-05-18 DIAGNOSIS — Z85.048 HISTORY OF MALIGNANT NEOPLASM OF RECTUM: Primary | ICD-10-CM

## 2021-05-18 DIAGNOSIS — Z01.818 PRE-OP EVALUATION: ICD-10-CM

## 2021-05-18 DIAGNOSIS — Z12.11 COLON CANCER SCREENING: ICD-10-CM

## 2021-05-18 PROCEDURE — 99214 OFFICE O/P EST MOD 30 MIN: CPT | Performed by: FAMILY MEDICINE

## 2021-05-18 ASSESSMENT — ENCOUNTER SYMPTOMS
VOMITING: 0
ABDOMINAL PAIN: 0
COUGH: 0
NAUSEA: 0
COLOR CHANGE: 1
BACK PAIN: 1
CONSTIPATION: 0
DIARRHEA: 0
SHORTNESS OF BREATH: 0
WHEEZING: 0

## 2021-05-18 NOTE — PROGRESS NOTES
21  Natasha Magallon : 1950 Sex: male  Age: 78 y.o. Chief Complaint   Patient presents with    Mass     on chest    Pre-op Exam     HPI:  78 y.o. male presents today for pre-op exam.  Patient's chart, medical, surgical and medication history all reviewed. Pre-Operative Risk assessment using 2014 ACC/AHA guidelines   Procedure: Colonoscopy   Surgeon: Dr. Nnamdi Yang  Emergent procedure No  Active Cardiac Condition No (decompensated HF, Arrhythmia, MI <3 weeks, severe valve disease)  Risk Level of Procedure Low Risk (endoscopy, superficial skin, breast, ambulatory, or cataract, etc.)  Revised Cardiac Risk Index Risk factors: History of heart failure  Diabetic treated with insulin  Measurement of Exercise Tolerance before Surgery >4 No    According to the 2014 ACC/AHA pre-operative risk assessment guidelines Natsaha Magallon is a low risk for major cardiac complications during a low risk procedure and may continue as planned. Specific medication recommendations are listed below. Medications recommended to continue should be taken with a sip of water even when NPO. Further recommendations from consultants: None    Medication Recommendations:  ACEI/ARB Continue the day of surgery  Diuretics HOLD the morning dose on the day of surgery  Statins should be continued the day of surgery  Diabetes Oral hypoglycemic agents - hold oral hypoglycemic agents for the morning of surgery. Resume oral hypoglycemic agents post procedure with diet  Insulin Long acting insulin continue any long acting insulin (glargine or degludec) with a 30-50% reduction in dose the last dose pre-procedure. I recommend the patient take 25 units  Short acting insulin Hold short acting insulin the morning of the procedure        ROS:  Review of Systems   Constitutional: Negative for chills, fatigue and fever. Respiratory: Negative for cough, shortness of breath and wheezing.     Cardiovascular: Negative for chest pain, palpitations and leg swelling. Gastrointestinal: Negative for abdominal pain, constipation, diarrhea, nausea and vomiting. Musculoskeletal: Positive for arthralgias, back pain and gait problem. Skin: Positive for color change. Negative for rash. Neurological: Positive for weakness and numbness. Negative for dizziness and headaches. Psychiatric/Behavioral: Negative for dysphoric mood. The patient is not nervous/anxious. All other systems reviewed and are negative. Current Outpatient Medications on File Prior to Visit   Medication Sig Dispense Refill    Insulin Pen Needle (PEN NEEDLES) 31G X 5 MM MISC 1 each by Does not apply route 2 times daily 200 each 2    bumetanide (BUMEX) 1 MG tablet Take 1 mg by mouth 2 times daily      Semaglutide,0.25 or 0.5MG/DOS, (OZEMPIC, 0.25 OR 0.5 MG/DOSE,) 2 MG/1.5ML SOPN Ozempic 0.25 mg or 0.5 mg (2 mg/1.5 mL) subcutaneous pen injector   INJECT 0.5MG UNDER THE SKIN ONCE WEEKLY      LORazepam (ATIVAN) 1 MG tablet lorazepam 1 mg tablet   TAKE 1 TABLET BY MOUTH TWICE DAILY      pantoprazole (PROTONIX) 40 MG tablet pantoprazole 40 mg tablet,delayed release   TK 1 T PO D      Insulin Glargine, 2 Unit Dial, (TOUJEO MAX SOLOSTAR) 300 UNIT/ML SOPN Toujeo Max U-300 SoloStar 300 unit/mL (3 mL) subcutaneous insulin pen   INJECT A MAX OF 50 UNITS UNDER THE SKIN DAILY      HYDROcodone-acetaminophen (NORCO) 5-325 MG per tablet hydrocodone 5 mg-acetaminophen 325 mg tablet   TK 1 T PO  Q 6 H PRN FOR PAIN      HYDROcodone-acetaminophen (NORCO) 7.5-325 MG per tablet Take 1 tablet by mouth every 6 hours as needed for Pain for up to 30 days. Intended supply: 30 days 120 tablet 0    [START ON 5/31/2021] HYDROcodone-acetaminophen (NORCO) 7.5-325 MG per tablet Take 1 tablet by mouth every 6 hours as needed for Pain for up to 30 days.  Intended supply: 30 days 120 tablet 0    fluticasone (FLONASE) 50 MCG/ACT nasal spray 1 spray by Each Nostril route daily 3 Bottle 3    LORazepam (ATIVAN) 1 MG tablet Take 1 tablet by mouth every 8 hours as needed for Anxiety for up to 90 days. 180 tablet 0    albuterol sulfate  (90 Base) MCG/ACT inhaler Inhale 2 puffs into the lungs 4 times daily as needed (2 puffs) 1 Inhaler 3    Continuous Blood Gluc Sensor (FREESTYLE MEDHAT 14 DAY SENSOR) [facility] device to check BS 3-4x/day and PRN for abnormal BS 2 each 5    Continuous Blood Gluc  (FREESTYLE MEDHAT 2 READER) [facility] Use device to check BS 3-4x/day and PRN for abnormal BS 1 each 5    atorvastatin (LIPITOR) 20 MG tablet Take 20 mg by mouth daily      losartan (COZAAR) 50 MG tablet Take 50 mg by mouth daily      metoprolol tartrate (LOPRESSOR) 50 MG tablet Take 50 mg by mouth 2 times daily      glimepiride (AMARYL) 4 MG tablet Take 4 mg by mouth every morning (before breakfast)      insulin lispro, 1 Unit Dial, (HUMALOG KWIKPEN) 100 UNIT/ML SOPN Inject 5 Units into the skin 3 times daily      JARDIANCE 25 MG tablet Take 25 mg by mouth Daily      albuterol sulfate  (90 Base) MCG/ACT inhaler Inhale 2 puffs into the lungs      Ferrous Sulfate (IRON) 325 (65 Fe) MG TABS Take 325 mg by mouth every other day 90 tablet 3    potassium chloride (KLOR-CON M) 20 MEQ TBCR extended release tablet TK 1 T PO  tablet 3    Ascorbic Acid (VITAMIN C) 250 MG tablet Take 250 mg by mouth daily      metOLazone (ZAROXOLYN) 2.5 MG tablet Take 1 tablet by mouth three times a week 36 tablet 1     No current facility-administered medications on file prior to visit.        Allergies   Allergen Reactions    Cleocin [Clindamycin Hcl]     Codeine     Eucerin [Albolene]     Glucophage [Metformin Hcl]     Januvia [Sitagliptin]     Lisinopril     Zithromax [Azithromycin]     Ciprofloxacin Hcl Swelling and Rash    Keflex [Cephalexin] Swelling and Rash       Past Medical History:   Diagnosis Date    DDD (degenerative disc disease), cervical     DDD (degenerative disc disease), thoracic     [facility]     Follicular adenocarcinoma, moderately differentiated ([facility] [facility]     rectum     Hyperlipidemia     Hypertension     Obesity     morbid     NICOLLE (obstructive sleep apnea)     Peripheral edema     Stasis dermatitis     Thyroid nodule     Vitamin D insufficiency      Past Surgical History:   Procedure Laterality Date    CERVICAL FUSION      COLONOSCOPY      EYE SURGERY      cataract with lol implants - OU    HERNIA REPAIR      umbilical     RECTAL SURGERY      transanal excision rectal cancer     SIGMOIDOSCOPY      procotoscopy     UPPER GASTROINTESTINAL ENDOSCOPY  06/15/2020    Radha- erosive gastritis     Family History   Problem Relation Age of Onset    Cancer Mother         breast     Diabetes Father     Diabetes Brother     Heart Attack Brother     Other Brother         valvular heart disease    Cancer Brother         lung      Social History     Socioeconomic History    Marital status:      Spouse name: Not on file    Number of children: Not on file    Years of education: Not on file    Highest education level: Not on file   Occupational History    Not on file   Tobacco Use    Smoking status: Never Smoker    Smokeless tobacco: Never Used   Vaping Use    Vaping Use: Never used   Substance and Sexual Activity    Alcohol use: Never    Drug use: Never    Sexual activity: Not on file   Other Topics Concern    Not on file   Social History Narrative    Not on file     Social Determinants of Health     Financial Resource Strain:     Difficulty of Paying Living Expenses:    Food Insecurity:     Worried About Running Out of Food in the Last Year:     [address] in the Last Year:    Transportation Needs:     Lack of Transportation (Medical):      Lack of Transportation (Non-Medical):    Physical Activity:     Days of Exercise per Week:     Minutes of Exercise per Session:    Stress:     Feeling of Stress :    Social Connections:     Frequency of Communication with Friends and Family:     Frequency of Social Gatherings with Friends and Family:     Attends Buddhist Services:     Active Member of Clubs or Organizations:     Attends Club or Organization Meetings:     Marital Status:    Intimate Partner Violence:     Fear of Current or Ex-Partner:     Emotionally Abused:     Physically Abused:     Sexually Abused:        Vitals:    05/18/21 1351   BP: (!) 142/82   Pulse: 87   Temp: 97.6 °F (36.4 °C)   SpO2: 93%   Weight: (!) 374 lb (169.6 kg)   Height: 5' 11\" (1.803 m)       Physical Exam:  Physical Exam  Vitals and nursing note reviewed. Constitutional:       General: He is not in acute distress. Appearance: Normal appearance. He is well-developed. He is morbidly obese. HENT:      Head: Normocephalic and atraumatic. Right Ear: Hearing and external ear normal.      Left Ear: Hearing and external ear normal.      Nose:      Comments: Wearing mask  Eyes:      General: Lids are normal. No scleral icterus. Extraocular Movements: Extraocular movements intact. Conjunctiva/sclera: Conjunctivae normal.   Neck:      Thyroid: No thyromegaly. Cardiovascular:      Rate and Rhythm: Normal rate. Rhythm irregularly irregular. Heart sounds: Normal heart sounds. No murmur heard. Pulmonary:      Effort: Pulmonary effort is normal. No respiratory distress. Breath sounds: Normal breath sounds. No wheezing. Musculoskeletal:         General: No tenderness or deformity. Normal range of motion. Cervical back: Normal range of motion and neck supple. Right lower leg: Edema (non-pitting) present. Left lower leg: Edema (non-pitting) present. Lymphadenopathy:      Cervical: No cervical adenopathy. Skin:     General: Skin is warm and dry. Findings: No rash. Comments: Skin changes consistent with venous insufficiency on bilateral lower legs   Neurological:      General: No focal deficit present.       Mental Status: He is alert and oriented to person, place, and time. Gait: Gait abnormal (using motorized scooter). Psychiatric:         Mood and Affect: Mood and affect normal.         Speech: Speech normal.         Behavior: Behavior normal.         Thought Content:  Thought content normal.         Labs:  CBC with Differential:    Lab Results   Component Value Date    WBC 9.0 03/16/2021    RBC 5.64 03/16/2021    HGB 15.3 03/16/2021    HCT 47.4 03/16/2021     03/16/2021    MCV 84.1 03/16/2021    MCH 27.2 03/16/2021    MCHC 32.3 03/16/2021    RDW 16.8 03/16/2021    SEGSPCT 71.4 02/16/2020    LYMPHOPCT 16.9 03/16/2021    MONOPCT 7.9 03/16/2021    EOSPCT 1.9 03/16/2021    BASOPCT 0.5 03/16/2021    MONOSABS 0.7 03/16/2021    LYMPHSABS 1.5 03/16/2021    EOSABS 0.2 03/16/2021    BASOSABS 0.0 03/16/2021     CMP:    Lab Results   Component Value Date    NA 16 03/16/2021    K 3.5 03/16/2021    CL 91 03/16/2021    CO2 33 03/16/2021    BUN 28 03/16/2021    CREATININE 1.1 03/16/2021    GFRAA 117 02/18/2020    AGRATIO 0.8 02/18/2020    LABGLOM 67 03/16/2021    LABGLOM 51 03/03/2020    GLUCOSE 210 03/16/2021    PROT 6.2 02/18/2020    LABALBU 3.6 03/16/2021    CALCIUM 9.2 03/16/2021    BILITOT 0.8 03/16/2021    ALKPHOS 92 03/16/2021    AST 22 03/16/2021    ALT 28 03/16/2021     U/A:    Lab Results   Component Value Date    COLORU YELLOW 02/08/2020    PROTEINU NEGATIVE 02/08/2020    PHUR 5.0 02/08/2020    WBCUA NEGATIVE 02/08/2020    WBCUA 6-10 02/08/2020    RBCUA LARGE 02/08/2020    MUCUS MODERATE 02/08/2020    BACTERIA Trace 02/08/2020    UROBILINOGEN NEGATIVE 02/08/2020    BILIRUBINUR NEGATIVE 02/08/2020    GLUCOSEU NEGATIVE 02/08/2020    AMORPHOUS FEW 02/08/2020     HgBA1c:    Lab Results   Component Value Date    LABA1C 9.8 03/16/2021     FLP:    Lab Results   Component Value Date    TRIG 137 03/16/2021    HDL 36 03/16/2021    LDLCALC 73 03/16/2021    LABVLDL 25 08/26/2019     TSH:    Lab Results   Component Value Date    TSH 1.14 03/16/2021 Assessment and Plan:  [place] was seen today for mass and pre-op exam.    Diagnoses and all orders for this visit:    History of malignant neoplasm of rectum    Colon cancer screening    Pre-op evaluation    Patient medically optimized for colonoscopy at this time. Low risk for procedure. Reviewed all medications and how to take them perioperatively. Return in about 2 months (around 7/18/2021) for Recheck.       Seen By:  Jose Luis Deshpande DO

## 2021-05-20 ENCOUNTER — TELEPHONE (OUTPATIENT)
Dept: PRIMARY CARE CLINIC | Age: 71
End: 2021-05-20

## 2021-05-20 DIAGNOSIS — E11.65 TYPE 2 DIABETES MELLITUS WITH HYPERGLYCEMIA, WITHOUT LONG-TERM CURRENT USE OF INSULIN (HCC): Primary | ICD-10-CM

## 2021-05-20 RX ORDER — FLASH GLUCOSE SENSOR
KIT MISCELLANEOUS
Qty: 1 EACH | Refills: 5 | Status: SHIPPED
Start: 2021-05-20 | End: 2022-06-02

## 2021-05-20 NOTE — TELEPHONE ENCOUNTER
[facility] is calling because they received a script for the pt for the freestyle adriana sensors but they need one for the freestyle adriaan 2 sensors

## 2021-07-01 DIAGNOSIS — M51.16 LUMBAR DISC DISEASE WITH RADICULOPATHY: ICD-10-CM

## 2021-07-01 RX ORDER — HYDROCODONE BITARTRATE AND ACETAMINOPHEN 7.5; 325 MG/1; MG/1
1 TABLET ORAL EVERY 6 HOURS PRN
Qty: 120 TABLET | Refills: 0 | Status: SHIPPED
Start: 2021-07-01 | End: 2021-07-06 | Stop reason: SDUPTHER

## 2021-07-06 ENCOUNTER — VIRTUAL VISIT (OUTPATIENT)
Dept: PRIMARY CARE CLINIC | Age: 71
End: 2021-07-06
Payer: MEDICARE

## 2021-07-06 DIAGNOSIS — I50.22 CHRONIC SYSTOLIC CONGESTIVE HEART FAILURE (HCC): ICD-10-CM

## 2021-07-06 DIAGNOSIS — Z00.00 ROUTINE GENERAL MEDICAL EXAMINATION AT A HEALTH CARE FACILITY: Primary | ICD-10-CM

## 2021-07-06 DIAGNOSIS — E55.9 VITAMIN D INSUFFICIENCY: ICD-10-CM

## 2021-07-06 DIAGNOSIS — I50.32 CHRONIC CONGESTIVE HEART FAILURE WITH LEFT VENTRICULAR DIASTOLIC DYSFUNCTION (HCC): ICD-10-CM

## 2021-07-06 DIAGNOSIS — J31.0 CHRONIC RHINITIS: ICD-10-CM

## 2021-07-06 DIAGNOSIS — E11.65 TYPE 2 DIABETES MELLITUS WITH HYPERGLYCEMIA, WITHOUT LONG-TERM CURRENT USE OF INSULIN (HCC): ICD-10-CM

## 2021-07-06 DIAGNOSIS — F06.4 ANXIETY DISORDER DUE TO MEDICAL CONDITION: ICD-10-CM

## 2021-07-06 DIAGNOSIS — I10 BENIGN ESSENTIAL HYPERTENSION: ICD-10-CM

## 2021-07-06 DIAGNOSIS — M51.16 LUMBAR DISC DISEASE WITH RADICULOPATHY: ICD-10-CM

## 2021-07-06 PROCEDURE — G0438 PPPS, INITIAL VISIT: HCPCS | Performed by: FAMILY MEDICINE

## 2021-07-06 RX ORDER — HYDROCODONE BITARTRATE AND ACETAMINOPHEN 7.5; 325 MG/1; MG/1
1 TABLET ORAL EVERY 6 HOURS PRN
Qty: 120 TABLET | Refills: 0 | Status: SHIPPED | OUTPATIENT
Start: 2021-08-31 | End: 2021-09-30

## 2021-07-06 RX ORDER — FLASH GLUCOSE SENSOR
KIT MISCELLANEOUS
Qty: 2 EACH | Refills: 5 | Status: SHIPPED
Start: 2021-07-06 | End: 2021-10-08 | Stop reason: SDUPTHER

## 2021-07-06 RX ORDER — SEMAGLUTIDE 1.34 MG/ML
INJECTION, SOLUTION SUBCUTANEOUS
COMMUNITY
Start: 2021-06-17 | End: 2021-10-08 | Stop reason: SDUPTHER

## 2021-07-06 RX ORDER — ALBUTEROL SULFATE 90 UG/1
2 AEROSOL, METERED RESPIRATORY (INHALATION) 4 TIMES DAILY PRN
Qty: 1 INHALER | Refills: 3 | Status: SHIPPED
Start: 2021-07-06 | End: 2022-08-01 | Stop reason: SDUPTHER

## 2021-07-06 RX ORDER — FLASH GLUCOSE SCANNING READER
EACH MISCELLANEOUS
Qty: 1 EACH | Refills: 5 | Status: SHIPPED
Start: 2021-07-06 | End: 2021-10-08 | Stop reason: SDUPTHER

## 2021-07-06 RX ORDER — METOLAZONE 2.5 MG/1
2.5 TABLET ORAL
Qty: 36 TABLET | Refills: 1 | Status: SHIPPED
Start: 2021-07-07 | End: 2021-11-08

## 2021-07-06 RX ORDER — HYDROCODONE BITARTRATE AND ACETAMINOPHEN 7.5; 325 MG/1; MG/1
1 TABLET ORAL EVERY 6 HOURS PRN
Qty: 120 TABLET | Refills: 0 | Status: SHIPPED
Start: 2021-09-28 | End: 2021-10-08 | Stop reason: SDUPTHER

## 2021-07-06 RX ORDER — LORAZEPAM 1 MG/1
1 TABLET ORAL 2 TIMES DAILY PRN
Qty: 180 TABLET | Refills: 0 | Status: SHIPPED
Start: 2021-07-06 | End: 2021-09-28 | Stop reason: SDUPTHER

## 2021-07-06 RX ORDER — LORAZEPAM 1 MG/1
TABLET ORAL
Qty: 90 TABLET | Refills: 0 | Status: SHIPPED | OUTPATIENT
Start: 2021-07-06 | End: 2021-07-06 | Stop reason: SDUPTHER

## 2021-07-06 RX ORDER — FLUTICASONE PROPIONATE 50 MCG
1 SPRAY, SUSPENSION (ML) NASAL DAILY
Qty: 3 BOTTLE | Refills: 3 | Status: SHIPPED | OUTPATIENT
Start: 2021-07-06

## 2021-07-06 RX ORDER — HYDROCODONE BITARTRATE AND ACETAMINOPHEN 7.5; 325 MG/1; MG/1
1 TABLET ORAL EVERY 6 HOURS PRN
Qty: 120 TABLET | Refills: 0 | Status: SHIPPED | OUTPATIENT
Start: 2021-08-03 | End: 2021-09-02

## 2021-07-06 RX ORDER — FLASH GLUCOSE SENSOR
KIT MISCELLANEOUS
Qty: 2 EACH | Refills: 5 | Status: CANCELLED | OUTPATIENT
Start: 2021-07-06

## 2021-07-06 RX ORDER — LOSARTAN POTASSIUM 50 MG/1
50 TABLET ORAL DAILY
Qty: 90 TABLET | Refills: 1 | Status: SHIPPED
Start: 2021-07-06 | End: 2021-08-13

## 2021-07-06 RX ORDER — METOLAZONE 2.5 MG/1
2.5 TABLET ORAL
COMMUNITY
End: 2021-07-06 | Stop reason: SDUPTHER

## 2021-07-06 ASSESSMENT — PATIENT HEALTH QUESTIONNAIRE - PHQ9
SUM OF ALL RESPONSES TO PHQ QUESTIONS 1-9: 0
2. FEELING DOWN, DEPRESSED OR HOPELESS: 0
SUM OF ALL RESPONSES TO PHQ9 QUESTIONS 1 & 2: 0
SUM OF ALL RESPONSES TO PHQ QUESTIONS 1-9: 0
1. LITTLE INTEREST OR PLEASURE IN DOING THINGS: 0
SUM OF ALL RESPONSES TO PHQ QUESTIONS 1-9: 0

## 2021-07-06 NOTE — PATIENT INSTRUCTIONS
Personalized Preventive Plan for Jenna Mancia - 7/6/2021  Medicare offers a range of preventive health benefits. Some of the tests and screenings are paid in full while other may be subject to a deductible, co-insurance, and/or copay. Some of these benefits include a comprehensive review of your medical history including lifestyle, illnesses that may run in your family, and various assessments and screenings as appropriate. After reviewing your medical record and screening and assessments performed today your provider may have ordered immunizations, labs, imaging, and/or referrals for you. A list of these orders (if applicable) as well as your Preventive Care list are included within your After Visit Summary for your review. Other Preventive Recommendations:    · A preventive eye exam performed by an eye specialist is recommended every 1-2 years to screen for glaucoma; cataracts, macular degeneration, and other eye disorders. · A preventive dental visit is recommended every 6 months. · Try to get at least 150 minutes of exercise per week or 10,000 steps per day on a pedometer . · Order or download the FREE \"Exercise & Physical Activity: Your Everyday Guide\" from [employer]. Call 6-262.268.9728 or search [employer] online. · You need 2133-7451 mg of calcium and 9856-4457 IU of vitamin D per day. It is possible to meet your calcium requirement with diet alone, but a vitamin D supplement is usually necessary to meet this goal.  · When exposed to the sun, use a sunscreen that protects against both UVA and UVB radiation with an SPF of 30 or greater. Reapply every 2 to 3 hours or after sweating, drying off with a towel, or swimming. · Always wear a seat belt when traveling in a car. Always wear a helmet when riding a bicycle or motorcycle.

## 2021-07-30 ENCOUNTER — TELEPHONE (OUTPATIENT)
Dept: FAMILY MEDICINE CLINIC | Age: 71
End: 2021-07-30

## 2021-07-30 NOTE — TELEPHONE ENCOUNTER
Donald Fritz from [facility] called to say that pt last filled his norco script on 7/1/21 and is asking to get it filled today. The script states earliest fill date is 8/3/21.   Donald Fritz is asking if ok to fill today?   (verbal notification is ok)

## 2021-08-04 ENCOUNTER — TELEPHONE (OUTPATIENT)
Dept: PRIMARY CARE CLINIC | Age: 71
End: 2021-08-04

## 2021-08-04 NOTE — TELEPHONE ENCOUNTER
----- Message from Cherylstephanie García sent at 8/4/2021 10:44 AM EDT -----  Subject: Referral Request    QUESTIONS   Reason for referral request? Pts wife Andrea Johnson called in stating that   insurance company will not cover oxygen tank unless pt has a breathing/   oxygen test done. Has the physician seen you for this condition before? No   Preferred Specialist (if applicable)? Do you already have an appointment scheduled? No  Additional Information for Provider?   ---------------------------------------------------------------------------  --------------  CALL BACK INFO  What is the best way for the office to contact you? OK to leave message on   voicemail  Preferred Call Back Phone Number?  [phone]

## 2021-08-04 NOTE — TELEPHONE ENCOUNTER
Pts wife Kenyon Haney called in stating that   insurance company will not cover oxygen tank unless pt has a breathing/   oxygen test done.

## 2021-08-13 DIAGNOSIS — I10 BENIGN ESSENTIAL HYPERTENSION: ICD-10-CM

## 2021-08-13 DIAGNOSIS — I10 ESSENTIAL HYPERTENSION: ICD-10-CM

## 2021-08-13 RX ORDER — METOPROLOL TARTRATE 50 MG/1
TABLET, FILM COATED ORAL
Qty: 180 TABLET | Refills: 1 | Status: SHIPPED
Start: 2021-08-13 | End: 2022-02-28

## 2021-08-13 RX ORDER — POTASSIUM CHLORIDE 1500 MG/1
TABLET, FILM COATED, EXTENDED RELEASE ORAL
Qty: 180 TABLET | Refills: 3 | Status: SHIPPED
Start: 2021-08-13 | End: 2021-10-20 | Stop reason: SDUPTHER

## 2021-08-13 RX ORDER — ATORVASTATIN CALCIUM 20 MG/1
TABLET, FILM COATED ORAL
Qty: 90 TABLET | Refills: 0 | Status: SHIPPED
Start: 2021-08-13 | End: 2021-11-23

## 2021-08-13 RX ORDER — LOSARTAN POTASSIUM 50 MG/1
50 TABLET ORAL DAILY
Qty: 90 TABLET | Refills: 1 | Status: SHIPPED
Start: 2021-08-13 | End: 2022-09-19

## 2021-08-13 RX ORDER — GLIMEPIRIDE 4 MG/1
TABLET ORAL
Qty: 90 TABLET | Refills: 0 | Status: SHIPPED
Start: 2021-08-13 | End: 2022-04-26 | Stop reason: SDUPTHER

## 2021-08-31 DIAGNOSIS — F06.4 ANXIETY DISORDER DUE TO MEDICAL CONDITION: ICD-10-CM

## 2021-08-31 RX ORDER — LORAZEPAM 1 MG/1
TABLET ORAL
Qty: 180 TABLET | OUTPATIENT
Start: 2021-08-31

## 2021-09-23 ENCOUNTER — TELEPHONE (OUTPATIENT)
Dept: FAMILY MEDICINE CLINIC | Age: 71
End: 2021-09-23

## 2021-09-23 NOTE — TELEPHONE ENCOUNTER
The first I have is Oct 8th at [place] [id number] in [facility].   We tried doing just a nurse visit, but according to his insurance, it has to be at an office visit for some reason

## 2021-09-23 NOTE — TELEPHONE ENCOUNTER
Marcy Moise was not able to make it to his appointment yesterday due to the weather. He needs to reschedule this and soon. I dont see anything available soon. Does he need to see you or can the oxygen check be done on a Nurse Visit?

## 2021-09-28 DIAGNOSIS — F06.4 ANXIETY DISORDER DUE TO MEDICAL CONDITION: ICD-10-CM

## 2021-09-28 RX ORDER — LORAZEPAM 1 MG/1
1 TABLET ORAL 2 TIMES DAILY PRN
Qty: 180 TABLET | Refills: 0 | Status: SHIPPED
Start: 2021-09-28 | End: 2021-11-08 | Stop reason: SDUPTHER

## 2021-10-06 LAB
25-HYDROXY VITAMIN D-3: 49 NG/ML (ref 30–100)
A/G RATIO: 1.1 RATIO (ref 1.1–2.2)
ALBUMIN SERPL-MCNC: 3.7 G/DL (ref 3.4–4.8)
ALP BLD-CCNC: 70 U/L (ref 42–121)
ALT SERPL-CCNC: 19 U/L (ref 10–63)
ANION GAP SERPL CALCULATED.3IONS-SCNC: 12 MEQ/L (ref 3–11)
APPEARANCE, BODY FLUID: CLEAR
AST SERPL-CCNC: 18 U/L (ref 10–41)
BASOPHILS ABSOLUTE: 0 K/UL (ref 0–0.2)
BASOPHILS RELATIVE PERCENT: 0.5 % (ref 0–1.5)
BILIRUB SERPL-MCNC: 0.8 MG/DL (ref 0.3–1.5)
BILIRUBIN URINE: NEGATIVE
BUN BLDV-MCNC: 32 MG/DL (ref 6–20)
CALCIUM SERPL-MCNC: 9.3 MG/DL (ref 8.5–10.5)
CHLORIDE BLD-SCNC: 93 MEQ/L (ref 98–107)
CO2: 30 MEQ/L (ref 21–31)
COLOR, URINE: YELLOW
CREAT SERPL-MCNC: 1 MG/DL (ref 0.6–1.3)
CREATININE + EGFR PANEL: 90 ML/MIN
CREATININE URINE: 71.7 MG/DL (ref 22–328)
EOSINOPHILS ABSOLUTE: 0.1 K/UL (ref 0–0.33)
EOSINOPHILS RELATIVE PERCENT: 1.4 % (ref 0–3)
FOLATE: 18.5 NG/ML
GFR NON-AFRICAN AMERICAN: 74 ML/MIN
GLOBULIN: 3.5 G/DL (ref 1.9–3.9)
GLUCOSE BLD-MCNC: 169 MG/DL (ref 70–99)
GLUCOSE URINE: >=500 MG/DL
HCT VFR BLD CALC: 46.4 % (ref 41–50)
HEMOGLOBIN: 15.4 G/DL (ref 13.5–16.5)
KETONES, URINE: NEGATIVE MG/DL
LEUKOCYTES, UA: NORMAL /HPF
LYMPHOCYTES ABSOLUTE: 1.6 K/UL (ref 1.1–4.8)
LYMPHOCYTES RELATIVE PERCENT: 19.2 % (ref 24–44)
MCH RBC QN AUTO: 28.1 PG (ref 28–34)
MCHC RBC AUTO-ENTMCNC: 33.3 G/DL (ref 33–37)
MCV RBC AUTO: 84.5 FL (ref 80–100)
MICROALBUMIN UR-MCNC: 2.7 UG/ML
MICROALBUMIN/CREAT UR-RTO: 3.8 MG/G
MICROSCOPIC URINE: YES
MONOCYTES ABSOLUTE: 0.7 K/UL (ref 0.2–0.7)
MONOCYTES RELATIVE PERCENT: 8 % (ref 3.4–9)
NEUTROPHILS ABSOLUTE: 5.8 K/UL (ref 1.83–8.7)
NITRATE, UA: NEGATIVE
PDW BLD-RTO: 16.4 % (ref 10.9–14.3)
PH UA: 5 (ref 4.6–8)
PLATELET # BLD: 263 K/UL (ref 150–450)
PMV BLD AUTO: 8.2 FL (ref 7.4–10.4)
POTASSIUM SERPL-SCNC: 3 MEQ/L (ref 3.6–5)
PROTEIN UA: NEGATIVE MG/DL
RBC # BLD: 5.49 M/UL (ref 4.5–5.5)
RBC URINE: NEGATIVE
SEGMENTED NEUTROPHILS RELATIVE PERCENT: 70.9 % (ref 40–74)
SODIUM BLD-SCNC: 135 MEQ/L (ref 135–145)
SPECIFIC GRAVITY, URINE: 1.02 (ref 1–1.03)
TOTAL PROTEIN: 7.2 G/DL (ref 5.9–7.8)
TRANSITIONAL EPITHELIAL: NORMAL /HPF
TSH SERPL DL<=0.05 MIU/L-ACNC: 0.87 UIU/ML (ref 0.34–5.6)
URATE: 9.1 MG/DL (ref 4.4–7.6)
UROBILINOGEN, URINE: NEGATIVE MG/DL
VITAMIN B-12: 248 PG/ML (ref 180–914)
WBC # BLD: 8.2 K/UL (ref 4.5–11)
WBC URINE: ABNORMAL

## 2021-10-07 LAB
ESTIMATED AVERAGE GLUCOSE: 214 MG/DL
HBA1C MFR BLD: 9.1 % (ref 4–6)

## 2021-10-08 ENCOUNTER — OFFICE VISIT (OUTPATIENT)
Dept: FAMILY MEDICINE CLINIC | Age: 71
End: 2021-10-08
Payer: MEDICARE

## 2021-10-08 DIAGNOSIS — I48.91 ATRIAL FIBRILLATION WITH CONTROLLED VENTRICULAR RESPONSE (HCC): ICD-10-CM

## 2021-10-08 DIAGNOSIS — E11.65 TYPE 2 DIABETES MELLITUS WITH HYPERGLYCEMIA, WITHOUT LONG-TERM CURRENT USE OF INSULIN (HCC): Primary | ICD-10-CM

## 2021-10-08 DIAGNOSIS — E66.01 MORBID OBESITY (HCC): ICD-10-CM

## 2021-10-08 DIAGNOSIS — I10 BENIGN ESSENTIAL HYPERTENSION: ICD-10-CM

## 2021-10-08 DIAGNOSIS — M51.16 LUMBAR DISC DISEASE WITH RADICULOPATHY: ICD-10-CM

## 2021-10-08 DIAGNOSIS — E78.2 MIXED HYPERLIPIDEMIA: ICD-10-CM

## 2021-10-08 DIAGNOSIS — I50.32 CHRONIC CONGESTIVE HEART FAILURE WITH LEFT VENTRICULAR DIASTOLIC DYSFUNCTION (HCC): ICD-10-CM

## 2021-10-08 PROCEDURE — 99214 OFFICE O/P EST MOD 30 MIN: CPT | Performed by: FAMILY MEDICINE

## 2021-10-08 RX ORDER — HYDROCODONE BITARTRATE AND ACETAMINOPHEN 7.5; 325 MG/1; MG/1
1 TABLET ORAL EVERY 6 HOURS PRN
Qty: 120 TABLET | Refills: 0 | Status: SHIPPED | OUTPATIENT
Start: 2021-10-08 | End: 2021-11-07

## 2021-10-08 RX ORDER — SEMAGLUTIDE 1.34 MG/ML
1 INJECTION, SOLUTION SUBCUTANEOUS WEEKLY
Qty: 9 ML | Refills: 1 | Status: SHIPPED | OUTPATIENT
Start: 2021-10-08 | End: 2022-01-06

## 2021-10-08 RX ORDER — INSULIN LISPRO 100 [IU]/ML
5 INJECTION, SOLUTION INTRAVENOUS; SUBCUTANEOUS
Qty: 13.5 ML | Refills: 1 | Status: SHIPPED
Start: 2021-10-08 | End: 2022-02-08

## 2021-10-08 RX ORDER — INSULIN GLARGINE 300 U/ML
50 INJECTION, SOLUTION SUBCUTANEOUS DAILY
Qty: 5 PEN | Refills: 1 | Status: SHIPPED
Start: 2021-10-08 | End: 2022-02-08

## 2021-10-08 RX ORDER — EMPAGLIFLOZIN 25 MG/1
25 TABLET, FILM COATED ORAL DAILY
Qty: 90 TABLET | Refills: 1 | Status: SHIPPED
Start: 2021-10-08 | End: 2022-04-26 | Stop reason: SDUPTHER

## 2021-10-08 RX ORDER — FLASH GLUCOSE SCANNING READER
EACH MISCELLANEOUS
Qty: 1 EACH | Refills: 5 | Status: SHIPPED | OUTPATIENT
Start: 2021-10-08

## 2021-10-08 SDOH — ECONOMIC STABILITY: FOOD INSECURITY: WITHIN THE PAST 12 MONTHS, THE FOOD YOU BOUGHT JUST DIDN'T LAST AND YOU DIDN'T HAVE MONEY TO GET MORE.: NEVER TRUE

## 2021-10-08 SDOH — ECONOMIC STABILITY: FOOD INSECURITY: WITHIN THE PAST 12 MONTHS, YOU WORRIED THAT YOUR FOOD WOULD RUN OUT BEFORE YOU GOT MONEY TO BUY MORE.: NEVER TRUE

## 2021-10-08 ASSESSMENT — ENCOUNTER SYMPTOMS
COLOR CHANGE: 1
ABDOMINAL PAIN: 0
CONSTIPATION: 0
WHEEZING: 0
DIARRHEA: 0
BACK PAIN: 1
COUGH: 0
SHORTNESS OF BREATH: 0
NAUSEA: 0
VOMITING: 0

## 2021-10-08 ASSESSMENT — SOCIAL DETERMINANTS OF HEALTH (SDOH): HOW HARD IS IT FOR YOU TO PAY FOR THE VERY BASICS LIKE FOOD, HOUSING, MEDICAL CARE, AND HEATING?: NOT VERY HARD

## 2021-10-08 NOTE — PROGRESS NOTES
10/8/21  Yasir Burkett   : 1950 Sex: male  Age: 73 y.o. Chief Complaint   Patient presents with    Diabetes    Hypertension    Hyperlipidemia     HPI:  73 y.o. male patient presents today for 3 month(s) follow up of chronic medical conditions, medication refills and FBW results. Patient's chart, medical, surgical and medication history all reviewed. Diabetes Mellitus  73 y.o. male presents for follow up of type 2 diabetes. Current diabetic medications include: oral agents (dual therapy): glimepiride (Amaryl), Jardiance, insulin injections: Toujeo and SSI prn for BS >200 and Ozempic. Patient seen by Dr. Yvan Steen and had Bydureon changed to Ozempic and added Toujeo. Previous medications tried include: oral agents (triple therapy): metformin (generic), pioglitazone (Actos), sitagliptin (Januvia) and insulin injections: Lantus : nightly, but failed due to various reasons. Most recent HgA1c was 9.1% Jerson 2021)---9.8% (2021)---6.7% (2020)--- 6.5% (2020)---7.4% (2019). His most recent TSH was 0.87. LDL is 72. Renal function is improved. The patient has evidence of end organ damage including nephropathy, peripheral neuropathy and peripheral vascular disease. He does not see a Podiatrist for foot care . Last eye exam was unknown. Hypertension   The patient presents today for follow up of HTN. The problem is well controlled. Risk factors for coronary artery disease include Age > 27, male, HTN, diabetes and elevated cholesterol. Current treatments include bumetanide (Bumex), losartan (Cozaar), metolazone (Zaroxolyn) and metoprolol (Lopressor, Toprol). The patient is compliant all of the time. Lifestyle changes the patient has made include none. Today the patient is complaining of peripheral edema. He does wear compression stockings.          Hyperlipidemia  The 10-year ASCVD risk score (Sharee Quinn., et al., 2013) is: 34.9%    Values used to calculate the score:      Age: 73 years      Sex: Male      Is Non- : No      Diabetic: Yes      Tobacco smoker: No      Systolic Blood Pressure: 789 mmHg      Is BP treated: Yes      HDL Cholesterol: 31 mg/dL      Total Cholesterol: 131 mg/dL    Back pain  Patient has a history of ongoing lumbar back pain. Significant neurologic dysfunction due to back issues. Unable to walk due to the weakness of lower extremities- wheelchair bound. Only able to walk approximately 20 feet. ROS:  Review of Systems   Constitutional: Negative for chills, fatigue and fever. Respiratory: Negative for cough, shortness of breath and wheezing. Cardiovascular: Positive for leg swelling. Negative for chest pain and palpitations. Gastrointestinal: Negative for abdominal pain, constipation, diarrhea, nausea and vomiting. Musculoskeletal: Positive for arthralgias, back pain and gait problem. Skin: Positive for color change. Negative for rash. Neurological: Positive for weakness and numbness. Negative for dizziness and headaches. Psychiatric/Behavioral: Negative for dysphoric mood. The patient is not nervous/anxious. All other systems reviewed and are negative. Current Outpatient Medications on File Prior to Visit   Medication Sig Dispense Refill    LORazepam (ATIVAN) 1 MG tablet Take 1 tablet by mouth 2 times daily as needed for Anxiety for up to 90 days.  180 tablet 0    potassium chloride (KLOR-CON M) 20 MEQ TBCR extended release tablet TAKE 1 TABLET BY MOUTH TWICE DAILY 180 tablet 3    losartan (COZAAR) 50 MG tablet TAKE 1 TABLET BY MOUTH DAILY 90 tablet 1    metoprolol tartrate (LOPRESSOR) 50 MG tablet TAKE 1 TABLET BY MOUTH TWICE DAILY 180 tablet 1    glimepiride (AMARYL) 4 MG tablet TAKE 1 TABLET BY MOUTH EVERY MORNING BEFORE BREAKFAST 90 tablet 0    atorvastatin (LIPITOR) 20 MG tablet TAKE 1 TABLET BY MOUTH EVERY MORNING 90 tablet 0    fluticasone (FLONASE) 50 MCG/ACT nasal spray 1 spray by Each Nostril route daily 3 Bottle 3    albuterol sulfate  (90 Base) MCG/ACT inhaler Inhale 2 puffs into the lungs 4 times daily as needed (2 puffs) 1 Inhaler 3    Continuous Blood Gluc Sensor (FREESTYLE MEDHAT 2 SENSOR) MISC Use as directed to check blood sugar 4x/day and PRN 1 each 5    Insulin Pen Needle (PEN NEEDLES) 31G X 5 MM MISC 1 each by Does not apply route 2 times daily 200 each 2    bumetanide (BUMEX) 1 MG tablet Take 1 mg by mouth 2 times daily      Ferrous Sulfate (IRON) 325 (65 Fe) MG TABS Take 325 mg by mouth every other day 90 tablet 3    Ascorbic Acid (VITAMIN C) 250 MG tablet Take 250 mg by mouth daily      metOLazone (ZAROXOLYN) 2.5 MG tablet Take 1 tablet by mouth three times a week 36 tablet 1    metOLazone (ZAROXOLYN) 2.5 MG tablet Take 1 tablet by mouth three times a week 36 tablet 1     No current facility-administered medications on file prior to visit.        Allergies   Allergen Reactions    Cleocin [Clindamycin Hcl]     Codeine     Eucerin [Albolene]     Glucophage [Metformin Hcl]     Januvia [Sitagliptin]     Lisinopril     Zithromax [Azithromycin]     Ciprofloxacin Hcl Swelling and Rash    Keflex [Cephalexin] Swelling and Rash       Past Medical History:   Diagnosis Date    DDD (degenerative disc disease), cervical     DDD (degenerative disc disease), thoracic     Diabetes mellitus ([address]     Follicular adenocarcinoma, moderately differentiated ([address]     rectum     Hyperlipidemia     Hypertension     Obesity     morbid     NICOLLE (obstructive sleep apnea)     Peripheral edema     Stasis dermatitis     Thyroid nodule     Vitamin D insufficiency      Past Surgical History:   Procedure Laterality Date    CERVICAL FUSION      COLONOSCOPY      EYE SURGERY      cataract with lol implants - [facility]U    HERNIA REPAIR      umbilical     RECTAL SURGERY      transanal excision rectal cancer     SIGMOIDOSCOPY      procotoscopy     UPPER GASTROINTESTINAL ENDOSCOPY  06/15/2020    Radha- erosive gastritis     Family History   Problem Relation Age of Onset    Cancer Mother         breast     Diabetes Father     Diabetes Brother     Heart Attack Brother     Other Brother         valvular heart disease    Cancer Brother         lung      Social History     Socioeconomic History    Marital status:      Spouse name: Not on file    Number of children: Not on file    Years of education: Not on file    Highest education level: Not on file   Occupational History    Not on file   Tobacco Use    Smoking status: Never Smoker    Smokeless tobacco: Never Used   Vaping Use    Vaping Use: Never used   Substance and Sexual Activity    Alcohol use: Never    Drug use: Never    Sexual activity: Not on file   Other Topics Concern    Not on file   Social History Narrative    Not on file     Social Determinants of Health     Financial Resource Strain: Low Risk     Difficulty of Paying Living Expenses: Not very hard   Food Insecurity: No Food Insecurity    Worried About Running Out of Food in the Last Year: Never true    [address] in the Last Year: Never true   Transportation Needs:     Lack of Transportation (Medical):  Lack of Transportation (Non-Medical):    Physical Activity:     Days of Exercise per Week:     Minutes of Exercise per Session:    Stress:     Feeling of Stress :    Social Connections:     Frequency of Communication with Friends and Family:     Frequency of Social Gatherings with Friends and Family:     Attends Hinduism Services:     Active Member of Clubs or Organizations:     Attends Club or Organization Meetings:     Marital Status:    Intimate Partner Violence:     Fear of Current or Ex-Partner:     Emotionally Abused:     Physically Abused:     Sexually Abused:      Vitals:    10/08/21 1037   BP:    Pulse:    Resp:    Temp:    SpO2: 90%        Physical Exam:  Physical Exam  Vitals and nursing note reviewed. Constitutional:       General: He is not in acute distress. Appearance: Normal appearance. He is well-developed. He is morbidly obese. He is not ill-appearing. HENT:      Head: Normocephalic and atraumatic. Right Ear: Hearing and external ear normal.      Left Ear: Hearing and external ear normal.      Nose:      Comments: Wearing mask  Eyes:      General: Lids are normal. No scleral icterus. Extraocular Movements: Extraocular movements intact. Conjunctiva/sclera: Conjunctivae normal.   Neck:      Thyroid: No thyromegaly. Cardiovascular:      Rate and Rhythm: Normal rate. Rhythm irregularly irregular. Heart sounds: Normal heart sounds. No murmur heard. Pulmonary:      Effort: Pulmonary effort is normal. No respiratory distress. Breath sounds: Normal breath sounds. No wheezing. Musculoskeletal:         General: No tenderness or deformity. Normal range of motion. Cervical back: Normal range of motion and neck supple. Right lower leg: Edema (non-pitting) present. Left lower leg: Edema (non-pitting) present. Lymphadenopathy:      Cervical: No cervical adenopathy. Skin:     General: Skin is warm and dry. Findings: No rash. Comments: Skin changes consistent with venous insufficiency on bilateral lower legs   Neurological:      General: No focal deficit present. Mental Status: He is alert and oriented to person, place, and time. Gait: Gait abnormal (using motorized scooter). Psychiatric:         Mood and Affect: Mood and affect normal.         Speech: Speech normal.         Behavior: Behavior normal.         Thought Content:  Thought content normal.           Labs:  CBC with Differential:    Lab Results   Component Value Date    WBC 8.2 10/06/2021    RBC 5.49 10/06/2021    HGB 15.4 10/06/2021    HCT 46.4 10/06/2021     10/06/2021    MCV 84.5 10/06/2021    MCH 28.1 10/06/2021    MCHC 33.3 10/06/2021    RDW 16.4 10/06/2021    SEGSPCT 70.9 10/06/2021    LYMPHOPCT 19.2 10/06/2021    MONOPCT 8.0 10/06/2021    EOSPCT 1.9 03/16/2021    BASOPCT 0.5 10/06/2021    MONOSABS 0.7 10/06/2021    LYMPHSABS 1.6 10/06/2021    EOSABS 0.1 10/06/2021    BASOSABS 0.0 10/06/2021     CMP:    Lab Results   Component Value Date     10/06/2021    K 3.0 10/06/2021    CL 93 10/06/2021    CO2 30 10/06/2021    BUN 32 10/06/2021    CREATININE 1.0 10/06/2021    GFRAA 117 02/18/2020    AGRATIO 1.1 10/06/2021    LABGLOM 74 10/06/2021    GLUCOSE 169 10/06/2021    PROT 7.2 10/06/2021    LABALBU 3.7 10/06/2021    CALCIUM 9.3 10/06/2021    BILITOT 0.8 10/06/2021    ALKPHOS 70 10/06/2021    AST 18 10/06/2021    ALT 19 10/06/2021     HgBA1c:    Lab Results   Component Value Date    LABA1C 9.1 10/06/2021     Microalbumen/Creatinine ratio:  No components found for: RUCREAT  FLP:    Lab Results   Component Value Date    TRIG 149 10/06/2021    HDL 31 10/06/2021    LDLCALC 73 03/16/2021    LABVLDL 25 08/26/2019     TSH:    Lab Results   Component Value Date    TSH 0.87 10/06/2021     PSA:   Lab Results   Component Value Date    PSA 3.37 03/16/2021        Assessment and Plan:  Joshua Caruso was seen today for diabetes, hypertension and hyperlipidemia. Diagnoses and all orders for this visit:    Type 2 diabetes mellitus with hyperglycemia, without long-term current use of insulin (HCC)  -     Continuous Blood Gluc  (FREESTYLE MEDHAT 2 READER) GERALD; Use device to check BS 3-4x/day and PRN for abnormal BS  -     JARDIANCE 25 MG tablet; Take 25 mg by mouth Daily  -     insulin lispro, 1 Unit Dial, (HUMALOG KWIKPEN) 100 UNIT/ML SOPN; Inject 5 Units into the skin 3 times daily (before meals)  -     Insulin Glargine, 2 Unit Dial, (TOUJEO MAX SOLOSTAR) 300 UNIT/ML SOPN; Inject 50 Units into the skin daily  -     OZEMPIC, 1 MG/DOSE, 4 MG/3ML SOPN; Inject 1 mg into the skin once a week  Labs reviewed in detail. Poor control of blood sugars. Patient [facility] Dr. Kwadwo Johnson at this time. Benign essential hypertension  Well controlled    Atrial fibrillation with controlled ventricular response (HCC)  Rate controlled      Chronic congestive heart failure with left ventricular diastolic dysfunction (HCC)  Explained to patient that he needs to be following with cardiology. He cannot walk even 20 ft without becoming fatigued and SOB. O2 drops to 90% on room air with only 20 ft of ambulation. Already taking 2 diuretics (changed during hospital admission). K low even with supplementation. Patient to make cardiology appointment and if he can't we will place new referral.      Lumbar disc disease with radiculopathy  -     HYDROcodone-acetaminophen (NORCO) 7.5-325 MG per tablet; Take 1 tablet by mouth every 6 hours as needed for Pain for up to 30 days. Intended supply: 30 days  -     Handicap Placard MIS; by Does not apply route Duration:  Lifetime  Exp: 10/2026  OARRS appropriate    Mixed hyperlipidemia    Morbid obesity ([facility] [address]      Return in about 4 weeks (around 11/5/2021), or if symptoms worsen or fail to improve, for Chronic medical conditions.       Seen By:  Kasia Lai DO

## 2021-10-17 VITALS
WEIGHT: 315 LBS | BODY MASS INDEX: 44.1 KG/M2 | HEIGHT: 71 IN | DIASTOLIC BLOOD PRESSURE: 66 MMHG | SYSTOLIC BLOOD PRESSURE: 130 MMHG | TEMPERATURE: 98 F | RESPIRATION RATE: 16 BRPM | HEART RATE: 74 BPM | OXYGEN SATURATION: 90 %

## 2021-10-20 ENCOUNTER — TELEPHONE (OUTPATIENT)
Dept: PRIMARY CARE CLINIC | Age: 71
End: 2021-10-20

## 2021-10-20 DIAGNOSIS — I10 ESSENTIAL HYPERTENSION: ICD-10-CM

## 2021-10-20 RX ORDER — POTASSIUM CHLORIDE 1500 MG/1
20 TABLET, FILM COATED, EXTENDED RELEASE ORAL 3 TIMES DAILY
Qty: 270 TABLET | Refills: 1 | Status: SHIPPED
Start: 2021-10-20 | End: 2022-02-08

## 2021-11-08 ENCOUNTER — TELEPHONE (OUTPATIENT)
Dept: PRIMARY CARE CLINIC | Age: 71
End: 2021-11-08

## 2021-11-08 ENCOUNTER — VIRTUAL VISIT (OUTPATIENT)
Dept: PRIMARY CARE CLINIC | Age: 71
End: 2021-11-08
Payer: MEDICARE

## 2021-11-08 DIAGNOSIS — M51.16 LUMBAR DISC DISEASE WITH RADICULOPATHY: ICD-10-CM

## 2021-11-08 DIAGNOSIS — I48.91 ATRIAL FIBRILLATION WITH CONTROLLED VENTRICULAR RESPONSE (HCC): ICD-10-CM

## 2021-11-08 DIAGNOSIS — E11.65 TYPE 2 DIABETES MELLITUS WITH HYPERGLYCEMIA, WITH LONG-TERM CURRENT USE OF INSULIN (HCC): Primary | ICD-10-CM

## 2021-11-08 DIAGNOSIS — I50.32 CHRONIC CONGESTIVE HEART FAILURE WITH LEFT VENTRICULAR DIASTOLIC DYSFUNCTION (HCC): ICD-10-CM

## 2021-11-08 DIAGNOSIS — I10 ESSENTIAL HYPERTENSION: ICD-10-CM

## 2021-11-08 DIAGNOSIS — Z85.048 HISTORY OF MALIGNANT NEOPLASM OF RECTUM: ICD-10-CM

## 2021-11-08 DIAGNOSIS — I50.22 CHRONIC SYSTOLIC CONGESTIVE HEART FAILURE (HCC): ICD-10-CM

## 2021-11-08 DIAGNOSIS — Z79.4 TYPE 2 DIABETES MELLITUS WITH HYPERGLYCEMIA, WITH LONG-TERM CURRENT USE OF INSULIN (HCC): Primary | ICD-10-CM

## 2021-11-08 DIAGNOSIS — E66.01 MORBID OBESITY (HCC): ICD-10-CM

## 2021-11-08 DIAGNOSIS — F06.4 ANXIETY DISORDER DUE TO MEDICAL CONDITION: ICD-10-CM

## 2021-11-08 DIAGNOSIS — S81.801A WOUND OF RIGHT LOWER EXTREMITY, INITIAL ENCOUNTER: ICD-10-CM

## 2021-11-08 PROCEDURE — 99214 OFFICE O/P EST MOD 30 MIN: CPT | Performed by: FAMILY MEDICINE

## 2021-11-08 RX ORDER — LORAZEPAM 1 MG/1
1 TABLET ORAL 2 TIMES DAILY PRN
Qty: 180 TABLET | Refills: 0 | Status: SHIPPED
Start: 2021-11-08 | End: 2022-02-06

## 2021-11-08 RX ORDER — METOLAZONE 2.5 MG/1
2.5 TABLET ORAL
COMMUNITY
End: 2021-12-27

## 2021-11-08 RX ORDER — HYDROCODONE BITARTRATE AND ACETAMINOPHEN 7.5; 325 MG/1; MG/1
1 TABLET ORAL EVERY 6 HOURS PRN
Qty: 120 TABLET | Refills: 0 | Status: SHIPPED
Start: 2022-01-03 | End: 2022-02-01 | Stop reason: SDUPTHER

## 2021-11-08 RX ORDER — HYDROCODONE BITARTRATE AND ACETAMINOPHEN 7.5; 325 MG/1; MG/1
1 TABLET ORAL EVERY 6 HOURS PRN
Qty: 120 TABLET | Refills: 0 | Status: SHIPPED
Start: 2021-11-08 | End: 2022-02-08 | Stop reason: SDUPTHER

## 2021-11-08 RX ORDER — HYDROCODONE BITARTRATE AND ACETAMINOPHEN 7.5; 325 MG/1; MG/1
1 TABLET ORAL EVERY 6 HOURS PRN
Qty: 120 TABLET | Refills: 0 | Status: SHIPPED
Start: 2021-12-06 | End: 2022-02-08 | Stop reason: SDUPTHER

## 2021-11-08 ASSESSMENT — ENCOUNTER SYMPTOMS
COUGH: 0
ABDOMINAL PAIN: 0
WHEEZING: 0
SHORTNESS OF BREATH: 0
CONSTIPATION: 0
DIARRHEA: 0
VOMITING: 0
COLOR CHANGE: 1
NAUSEA: 0
BACK PAIN: 1

## 2021-11-08 NOTE — PROGRESS NOTES
21  Lloyd Harrell   : 1950 Sex: male  Age: 73 y.o. Chief Complaint   Patient presents with    Diabetes    Referral - General     needs referral for wound on his leg      HPI:  73 y.o. male patient presents today for 3 month(s) follow up of chronic medical conditions, medication refills and request for referral.  Patient's chart, medical, surgical and medication history all reviewed. TeleMedicine Patient Consent    This visit was performed as a virtual video visit using a synchronous, two-way, audio-video telehealth technology platform. Patient identification was verified at the start of the visit, including the patient's telephone number and physical location. I discussed with the patient the nature of our telehealth visits, that:     1. Due to the nature of an audio- video modality, the only components of a physical exam that could be done are the elements supported by direct observation. 2. I would evaluate the patient and recommend diagnostics and treatments based on my assessment. 3. If it was felt that the patient should be evaluated in clinic or an emergency room setting, then they would be directed there. 4. Our sessions are not being recorded and that personal health information is protected. 5. Our team would provide follow up care in person if/when the patient needs it. Patient does agree to proceed with telemedicine consultation. Patient's location: home address in [facility]. Physician location: home address in [facility]. Other people involved in call:  [place], [place]. Time spent: Greater than Not billed by time    This visit was completed virtually using Doxy. me      Diabetes Mellitus  73 y.o. male presents for follow up of type 2 diabetes. Current diabetic medications include: oral agents (dual therapy): glimepiride (Amaryl), Jardiance, insulin injections: Toujeo and SSI prn for BS >200 and Ozempic.    Patient seen by Dr. Deedee Marcano and had Bydureon changed to Ozempic and added Toujeo. Previous medications tried include: oral agents (triple therapy): metformin (generic), pioglitazone (Actos), sitagliptin (Januvia) and insulin injections: Lantus : nightly, but failed due to various reasons. Most recent HgA1c was 9.1% (October 2021)---9.8% (March 2021)---6.7% (June 2020)--- 6.5% (February 2020)---7.4% (August 2019). His most recent TSH was 0.87. LDL is 72. Renal function is improved. The patient has evidence of end organ damage including nephropathy, peripheral neuropathy and peripheral vascular disease. He does not see a Podiatrist for foot care . Last eye exam was unknown. Patient had chronic wound to LE. States that wound seeps and scab falls off. No surrounding erythema. No fevers. Hypertension   The patient presents today for follow up of HTN. The problem is unable to determine via VV. Risk factors for coronary artery disease include Age > 27, male, HTN, diabetes and elevated cholesterol. Current treatments include bumetanide (Bumex), losartan (Cozaar), metolazone (Zaroxolyn) and metoprolol (Lopressor, Toprol). The patient is compliant all of the time. Lifestyle changes the patient has made include none. Today the patient is complaining of peripheral edema. He does wear compression stockings. He has a history of CHF which required hospitalization in January 2020. Has not followed up with Cardiology in some time. Needs new referral to someone close by. Hyperlipidemia  The 10-year ASCVD risk score (Cely Staton, et al., 2013) is: 34.9%    Values used to calculate the score:      Age: 73 years      Sex: Male      Is Non- : No      Diabetic: Yes      Tobacco smoker: No      Systolic Blood Pressure: 094 mmHg      Is BP treated: Yes      HDL Cholesterol: 31 mg/dL      Total Cholesterol: 131 mg/dL    Back pain  Patient has a history of ongoing lumbar back pain. Significant neurologic dysfunction due to back issues.   Unable to walk due to the weakness of lower extremities- wheelchair bound. Only able to walk approximately 20 feet. ROS:  Review of Systems   Constitutional: Negative for chills, fatigue and fever. Respiratory: Negative for cough, shortness of breath and wheezing. Cardiovascular: Positive for leg swelling. Negative for chest pain and palpitations. Gastrointestinal: Negative for abdominal pain, constipation, diarrhea, nausea and vomiting. Musculoskeletal: Positive for arthralgias, back pain and gait problem. Skin: Positive for color change. Negative for rash. Neurological: Positive for weakness and numbness. Negative for dizziness and headaches. Psychiatric/Behavioral: Negative for dysphoric mood. The patient is not nervous/anxious. All other systems reviewed and are negative.        Current Outpatient Medications on File Prior to Visit   Medication Sig Dispense Refill    metOLazone (ZAROXOLYN) 2.5 MG tablet Take 2.5 mg by mouth Take one tablet three times a week      potassium chloride (KLOR-CON M) 20 MEQ TBCR extended release tablet Take 1 tablet by mouth 3 times daily 270 tablet 1    Continuous Blood Gluc  (FREESTYLE MEDHAT 2 READER) GERALD Use device to check BS 3-4x/day and PRN for abnormal BS 1 each 5    JARDIANCE 25 MG tablet Take 25 mg by mouth Daily 90 tablet 1    insulin lispro, 1 Unit Dial, (HUMALOG KWIKPEN) 100 UNIT/ML SOPN Inject 5 Units into the skin 3 times daily (before meals) 13.5 mL 1    Insulin Glargine, 2 Unit Dial, (TOUJEO MAX SOLOSTAR) 300 UNIT/ML SOPN Inject 50 Units into the skin daily (Patient taking differently: Inject 58 Units into the skin daily ) 5 pen 1    OZEMPIC, 1 MG/DOSE, 4 MG/3ML SOPN Inject 1 mg into the skin once a week 9 mL 1    Handicap Placard MISC by Does not apply route Duration:  Lifetime  Exp: 10/2026 1 each 0    losartan (COZAAR) 50 MG tablet TAKE 1 TABLET BY MOUTH DAILY 90 tablet 1    metoprolol tartrate (LOPRESSOR) 50 MG tablet TAKE 1 TABLET BY MOUTH TWICE DAILY 180 tablet 1    glimepiride (AMARYL) 4 MG tablet TAKE 1 TABLET BY MOUTH EVERY MORNING BEFORE BREAKFAST 90 tablet 0    atorvastatin (LIPITOR) 20 MG tablet TAKE 1 TABLET BY MOUTH EVERY MORNING 90 tablet 0    fluticasone (FLONASE) 50 MCG/ACT nasal spray 1 spray by Each Nostril route daily 3 Bottle 3    albuterol sulfate  (90 Base) MCG/ACT inhaler Inhale 2 puffs into the lungs 4 times daily as needed (2 puffs) 1 Inhaler 3    Continuous Blood Gluc Sensor (FREESTYLE MEDHAT 2 SENSOR) MISC Use as directed to check blood sugar 4x/day and PRN 1 each 5    Insulin Pen Needle (PEN NEEDLES) 31G X 5 MM MISC 1 each by Does not apply route 2 times daily 200 each 2    bumetanide (BUMEX) 1 MG tablet Take 1 mg by mouth 2 times daily      Ferrous Sulfate (IRON) 325 (65 Fe) MG TABS Take 325 mg by mouth every other day 90 tablet 3    Ascorbic Acid (VITAMIN C) 250 MG tablet Take 250 mg by mouth daily       No current facility-administered medications on file prior to visit.        Allergies   Allergen Reactions    Cleocin [Clindamycin Hcl]     Codeine     Eucerin [Albolene]     Glucophage [Metformin Hcl]     Januvia [Sitagliptin]     Lisinopril     Zithromax [Azithromycin]     Ciprofloxacin Hcl Swelling and Rash    Keflex [Cephalexin] Swelling and Rash       Past Medical History:   Diagnosis Date    DDD (degenerative disc disease), cervical     DDD (degenerative disc disease), thoracic     Diabetes mellitus ([address]     Follicular adenocarcinoma, moderately differentiated ([address]     rectum     Hyperlipidemia     Hypertension     Obesity     morbid     NICOLLE (obstructive sleep apnea)     Peripheral edema     Stasis dermatitis     Thyroid nodule     Vitamin D insufficiency      Past Surgical History:   Procedure Laterality Date    CERVICAL FUSION      COLONOSCOPY      EYE SURGERY      cataract with lol implants - OU    HERNIA REPAIR      umbilical     RECTAL SURGERY      transanal excision rectal cancer     SIGMOIDOSCOPY      procotoscopy     UPPER GASTROINTESTINAL ENDOSCOPY  06/15/2020    Radha- erosive gastritis     Family History   Problem Relation Age of Onset    Cancer Mother         breast     Diabetes Father     Diabetes Brother     Heart Attack Brother     Other Brother         valvular heart disease    Cancer Brother         lung      Social History     Socioeconomic History    Marital status:      Spouse name: Not on file    Number of children: Not on file    Years of education: Not on file    Highest education level: Not on file   Occupational History    Not on file   Tobacco Use    Smoking status: Never Smoker    Smokeless tobacco: Never Used   Vaping Use    Vaping Use: Never used   Substance and Sexual Activity    Alcohol use: Never    Drug use: Never    Sexual activity: Not on file   Other Topics Concern    Not on file   Social History Narrative    Not on file     Social Determinants of Health     Financial Resource Strain: Low Risk     Difficulty of Paying Living Expenses: Not very hard   Food Insecurity: No Food Insecurity    Worried About Running Out of Food in the Last Year: Never true    [address] in the Last Year: Never true   Transportation Needs:     Lack of Transportation (Medical): Not on file    Lack of Transportation (Non-Medical):  Not on file   Physical Activity:     Days of Exercise per Week: Not on file    Minutes of Exercise per Session: Not on file   Stress:     Feeling of Stress : Not on file   Social Connections:     Frequency of Communication with Friends and Family: Not on file    Frequency of Social Gatherings with Friends and Family: Not on file    Attends Baptism Services: Not on file    Active Member of Clubs or Organizations: Not on file    Attends Club or Organization Meetings: Not on file    Marital Status: Not on file   Intimate Partner Violence:     Fear of Current or Ex-Partner: Not on file   [facility] Emotionally Abused: Not on file    Physically Abused: Not on file    Sexually Abused: Not on file   Housing Stability:     Unable to Pay for Housing in the Last Year: Not on file    Number of Places Lived in the Last Year: Not on file    Unstable Housing in the Last Year: Not on file     There were no vitals filed for this visit. Physical Exam:  Physical Exam  Vitals and nursing note reviewed. Constitutional:       General: He is not in acute distress. Appearance: Normal appearance. He is well-developed. He is morbidly obese. He is not ill-appearing. HENT:      Head: Normocephalic and atraumatic. Right Ear: Hearing and external ear normal.      Left Ear: Hearing and external ear normal.      Nose: Nose normal.   Eyes:      General: Lids are normal. No scleral icterus. Extraocular Movements: Extraocular movements intact. Conjunctiva/sclera: Conjunctivae normal.   Pulmonary:      Effort: Pulmonary effort is normal. No respiratory distress. Breath sounds: No wheezing. Musculoskeletal:         General: Normal range of motion. Cervical back: Normal range of motion. Skin:     Findings: No rash. Comments: Wound noted to R LE with overlying scab. No surround erythema visible on VV   Neurological:      Mental Status: He is alert and oriented to person, place, and time. Psychiatric:         Mood and Affect: Mood and affect normal.         Speech: Speech normal.         Behavior: Behavior normal.         Thought Content:  Thought content normal.           Labs:  CBC with Differential:    Lab Results   Component Value Date    WBC 8.2 10/06/2021    RBC 5.49 10/06/2021    HGB 15.4 10/06/2021    HCT 46.4 10/06/2021     10/06/2021    MCV 84.5 10/06/2021    MCH 28.1 10/06/2021    MCHC 33.3 10/06/2021    RDW 16.4 10/06/2021    SEGSPCT 70.9 10/06/2021    LYMPHOPCT 19.2 10/06/2021    MONOPCT 8.0 10/06/2021    EOSPCT 1.9 03/16/2021    BASOPCT 0.5 10/06/2021    MONOSABS 0.7 10/06/2021    LYMPHSABS 1.6 10/06/2021    EOSABS 0.1 10/06/2021    BASOSABS 0.0 10/06/2021     CMP:    Lab Results   Component Value Date     10/06/2021    K 3.0 10/06/2021    CL 93 10/06/2021    CO2 30 10/06/2021    BUN 32 10/06/2021    CREATININE 1.0 10/06/2021    GFRAA 117 02/18/2020    AGRATIO 1.1 10/06/2021    LABGLOM 74 10/06/2021    GLUCOSE 169 10/06/2021    PROT 7.2 10/06/2021    LABALBU 3.7 10/06/2021    CALCIUM 9.3 10/06/2021    BILITOT 0.8 10/06/2021    ALKPHOS 70 10/06/2021    AST 18 10/06/2021    ALT 19 10/06/2021     HgBA1c:    Lab Results   Component Value Date    LABA1C 9.1 10/06/2021     Microalbumen/Creatinine ratio:  No components found for: RUCREAT  FLP:    Lab Results   Component Value Date    TRIG 149 10/06/2021    HDL 31 10/06/2021    LDLCALC 73 03/16/2021    LABVLDL 25 08/26/2019     TSH:    Lab Results   Component Value Date    TSH 0.87 10/06/2021     PSA:   Lab Results   Component Value Date    PSA 3.37 03/16/2021        Assessment and Plan:  Mis Mckeon was seen today for diabetes and referral - general.    Diagnoses and all orders for this visit:    Type 2 diabetes mellitus with hyperglycemia, with long-term current use of insulin ([facility] [address]  Follows with Endo. Chronic congestive heart failure with left ventricular diastolic dysfunction [facility])  -     External Referral To Cardiology  Explained to patient that he needs to be following with cardiology. He cannot walk even 20 ft without becoming fatigued and SOB. O2 drops to 90% on room air with only 20 ft of ambulation. Already taking 2 diuretics (changed during hospital admission). K low even with supplementation.      Chronic systolic congestive heart failure ([facility] [address]  -     External Referral To Cardiology    Atrial fibrillation with controlled ventricular response [facility])  -     External Referral To Cardiology    Wound of right lower extremity, initial encounter  -     External Referral To Wound Clinic  Patient with chronic venous stasis and severe leg swelling. Suspect reason for chronic wound. Needs wound management. Anxiety disorder due to medical condition  -     LORazepam (ATIVAN) 1 MG tablet; Take 1 tablet by mouth 2 times daily as needed for Anxiety for up to 90 days. Lumbar disc disease with radiculopathy  -     HYDROcodone-acetaminophen (NORCO) 7.5-325 MG per tablet; Take 1 tablet by mouth every 6 hours as needed for Pain for up to 30 days. Intended supply: 30 days  -     HYDROcodone-acetaminophen (NORCO) 7.5-325 MG per tablet; Take 1 tablet by mouth every 6 hours as needed for Pain for up to 30 days. Intended supply: 30 days  -     HYDROcodone-acetaminophen (NORCO) 7.5-325 MG per tablet; Take 1 tablet by mouth every 6 hours as needed for Pain for up to 30 days. Intended supply: 30 days  OARRS reviewed and is appropriate. Essential hypertension    History of malignant neoplasm of rectum  Had repeat colonoscopy which was normal.  Repeat 5 years. Morbid obesity ([facility] [address]          Return in about 3 months (around 2/8/2022), or if symptoms worsen or fail to improve, for Chronic pain medication refills.       Seen By:  Parul Ward DO

## 2021-11-09 RX ORDER — BROMPHENIRAMINE MALEATE, PSEUDOEPHEDRINE HYDROCHLORIDE, AND DEXTROMETHORPHAN HYDROBROMIDE 2; 30; 10 MG/5ML; MG/5ML; MG/5ML
5 SYRUP ORAL 4 TIMES DAILY PRN
Qty: 473 ML | Refills: 0 | Status: SHIPPED | OUTPATIENT
Start: 2021-11-09 | End: 2021-11-23

## 2021-11-09 NOTE — TELEPHONE ENCOUNTER
----- Message from Sheryle Lis sent at 11/9/2021  9:32 AM EST -----  Subject: Message to Provider    QUESTIONS  Information for Provider? Basim Cevallos wife calling. Pt had a VV yesterday for   cold symptoms. Pt is trying Mucinex but is feeling worse over the night   and now thinks he may need an abx. Please contact pt to let him know if an   abx can be sent over to [facility], [facility]  ---------------------------------------------------------------------------  --------------  [address]  What is the best way for the office to contact you? OK to leave message on   voicemail  Preferred Call Back Phone Number? [phone]  ---------------------------------------------------------------------------  --------------  SCRIPT ANSWERS  Relationship to Patient? Other  Representative Name? Basim Cevallos wife, verbal ok from pt  Is the Representative on the appropriate HIPAA document in Epic?  Yes

## 2021-11-09 NOTE — TELEPHONE ENCOUNTER
Bromfed sent for cough. He stated yesterday that symptoms had only just started and he was only using Mucinex. He needs to try conservative treatments like Flonase, Zyrtec and Mucinex daily for symptoms to clear. This can take 7-10 days.

## 2021-11-19 RX ORDER — BUMETANIDE 1 MG/1
TABLET ORAL
Qty: 180 TABLET | Status: CANCELLED | OUTPATIENT
Start: 2021-11-19

## 2021-11-23 RX ORDER — BUMETANIDE 1 MG/1
TABLET ORAL
Qty: 180 TABLET | Refills: 1 | Status: SHIPPED
Start: 2021-11-23 | End: 2022-05-31

## 2021-12-27 RX ORDER — METOLAZONE 2.5 MG/1
2.5 TABLET ORAL
Qty: 36 TABLET | Refills: 1 | Status: SHIPPED
Start: 2021-12-27 | End: 2022-04-26 | Stop reason: SDUPTHER

## 2022-01-25 LAB
LEFT VENTRICULAR EJECTION FRACTION MODE: NORMAL
LV EF: 50 %

## 2022-01-31 DIAGNOSIS — M51.16 LUMBAR DISC DISEASE WITH RADICULOPATHY: ICD-10-CM

## 2022-01-31 NOTE — TELEPHONE ENCOUNTER
Please check with the pharmacy to see if a script is available at the pharmacy first.  Should have one more

## 2022-02-01 RX ORDER — HYDROCODONE BITARTRATE AND ACETAMINOPHEN 7.5; 325 MG/1; MG/1
1 TABLET ORAL EVERY 6 HOURS PRN
Qty: 120 TABLET | Refills: 0 | Status: SHIPPED
Start: 2022-02-01 | End: 2022-05-09 | Stop reason: SDUPTHER

## 2022-02-08 ENCOUNTER — VIRTUAL VISIT (OUTPATIENT)
Dept: PRIMARY CARE CLINIC | Age: 72
End: 2022-02-08
Payer: MEDICARE

## 2022-02-08 DIAGNOSIS — I50.32 CHRONIC CONGESTIVE HEART FAILURE WITH LEFT VENTRICULAR DIASTOLIC DYSFUNCTION (HCC): ICD-10-CM

## 2022-02-08 DIAGNOSIS — I10 ESSENTIAL HYPERTENSION: ICD-10-CM

## 2022-02-08 DIAGNOSIS — Z79.4 TYPE 2 DIABETES MELLITUS WITH HYPERGLYCEMIA, WITH LONG-TERM CURRENT USE OF INSULIN (HCC): Primary | ICD-10-CM

## 2022-02-08 DIAGNOSIS — F06.4 ANXIETY DISORDER DUE TO MEDICAL CONDITION: ICD-10-CM

## 2022-02-08 DIAGNOSIS — M51.16 LUMBAR DISC DISEASE WITH RADICULOPATHY: ICD-10-CM

## 2022-02-08 DIAGNOSIS — E55.9 VITAMIN D DEFICIENCY: ICD-10-CM

## 2022-02-08 DIAGNOSIS — I48.91 ATRIAL FIBRILLATION WITH CONTROLLED VENTRICULAR RESPONSE (HCC): ICD-10-CM

## 2022-02-08 DIAGNOSIS — I50.22 CHRONIC SYSTOLIC CONGESTIVE HEART FAILURE (HCC): ICD-10-CM

## 2022-02-08 DIAGNOSIS — E11.65 TYPE 2 DIABETES MELLITUS WITH HYPERGLYCEMIA, WITH LONG-TERM CURRENT USE OF INSULIN (HCC): Primary | ICD-10-CM

## 2022-02-08 PROCEDURE — 99214 OFFICE O/P EST MOD 30 MIN: CPT | Performed by: FAMILY MEDICINE

## 2022-02-08 RX ORDER — DOXYCYCLINE HYCLATE 100 MG
TABLET ORAL
COMMUNITY
Start: 2022-02-01 | End: 2022-03-15

## 2022-02-08 RX ORDER — POTASSIUM CHLORIDE 20 MEQ/1
20 TABLET, EXTENDED RELEASE ORAL 3 TIMES DAILY
COMMUNITY
End: 2022-04-26 | Stop reason: SDUPTHER

## 2022-02-08 RX ORDER — HYDROCODONE BITARTRATE AND ACETAMINOPHEN 7.5; 325 MG/1; MG/1
1 TABLET ORAL EVERY 6 HOURS PRN
Qty: 120 TABLET | Refills: 0 | Status: SHIPPED
Start: 2022-03-29 | End: 2022-05-09 | Stop reason: SDUPTHER

## 2022-02-08 RX ORDER — INSULIN GLARGINE 300 U/ML
58 INJECTION, SOLUTION SUBCUTANEOUS DAILY
COMMUNITY

## 2022-02-08 RX ORDER — HYDROCODONE BITARTRATE AND ACETAMINOPHEN 7.5; 325 MG/1; MG/1
1 TABLET ORAL EVERY 6 HOURS PRN
Qty: 120 TABLET | Refills: 0 | Status: SHIPPED
Start: 2022-03-01 | End: 2022-05-09 | Stop reason: SDUPTHER

## 2022-02-08 RX ORDER — LORAZEPAM 1 MG/1
1 TABLET ORAL 2 TIMES DAILY PRN
Qty: 180 TABLET | Refills: 0 | Status: SHIPPED
Start: 2022-02-08 | End: 2022-05-09 | Stop reason: SDUPTHER

## 2022-02-08 RX ORDER — HYDROCODONE BITARTRATE AND ACETAMINOPHEN 7.5; 325 MG/1; MG/1
1 TABLET ORAL EVERY 6 HOURS PRN
Qty: 120 TABLET | Refills: 0 | Status: CANCELLED | OUTPATIENT
Start: 2022-02-08 | End: 2022-03-10

## 2022-02-08 RX ORDER — HYDROCODONE BITARTRATE AND ACETAMINOPHEN 7.5; 325 MG/1; MG/1
1 TABLET ORAL EVERY 6 HOURS PRN
Qty: 120 TABLET | Refills: 0 | Status: SHIPPED | OUTPATIENT
Start: 2022-04-26 | End: 2022-05-26

## 2022-02-08 RX ORDER — INSULIN LISPRO 100 [IU]/ML
5 INJECTION, SOLUTION INTRAVENOUS; SUBCUTANEOUS 3 TIMES DAILY
COMMUNITY

## 2022-02-08 ASSESSMENT — ENCOUNTER SYMPTOMS
SHORTNESS OF BREATH: 0
DIARRHEA: 0
COLOR CHANGE: 1
COUGH: 0
NAUSEA: 0
VOMITING: 0
BACK PAIN: 1
CONSTIPATION: 0
ABDOMINAL PAIN: 0
WHEEZING: 0

## 2022-02-08 NOTE — PROGRESS NOTES
include: oral agents (triple therapy): metformin (generic), pioglitazone (Actos), sitagliptin (Januvia) and insulin injections: Lantus : nightly, but failed due to various reasons. Most recent HgA1c was 9.1% (October 2021)---9.8% (March 2021)---6.7% (June 2020)--- 6.5% (February 2020)---7.4% (August 2019). His most recent TSH was 0.87. LDL is 72. Renal function is improved. The patient has evidence of end organ damage including nephropathy, peripheral neuropathy and peripheral vascular disease. He does not see a Podiatrist for foot care . Last eye exam was unknown. Hypertension   The patient presents today for follow up of HTN. The problem is unable to determine via VV. Risk factors for coronary artery disease include Age > 27, male, HTN, diabetes and elevated cholesterol. Current treatments include bumetanide (Bumex), losartan (Cozaar), metolazone (Zaroxolyn) and metoprolol (Lopressor, Toprol). The patient is compliant all of the time. Lifestyle changes the patient has made include none. Today the patient is complaining of peripheral edema. He does wear compression stockings. He has a history of CHF which required hospitalization in January 2020. Recently seen by Dr. Adriel Hoskins who checked Echo in January. No change from 2020. Planning possible ablation this Summer 2022. Hyperlipidemia  The 10-year ASCVD risk score (Lucy Reid, et al., 2013) is: 34.9%    Values used to calculate the score:      Age: 73 years      Sex: Male      Is Non- : No      Diabetic: Yes      Tobacco smoker: No      Systolic Blood Pressure: 414 mmHg      Is BP treated: Yes      HDL Cholesterol: 31 mg/dL      Total Cholesterol: 131 mg/dL    Back pain  Patient has a history of ongoing lumbar back pain. Significant neurologic dysfunction due to back issues. Unable to walk due to the weakness of lower extremities- wheelchair bound. Only able to walk approximately 20 feet.      ROS:  Review of Systems   Constitutional: Negative for chills, fatigue and fever. Respiratory: Negative for cough, shortness of breath and wheezing. Cardiovascular: Positive for leg swelling. Negative for chest pain and palpitations. Gastrointestinal: Negative for abdominal pain, constipation, diarrhea, nausea and vomiting. Musculoskeletal: Positive for arthralgias, back pain and gait problem. Skin: Positive for color change. Negative for rash. Neurological: Positive for weakness and numbness. Negative for dizziness and headaches. Psychiatric/Behavioral: Negative for dysphoric mood. The patient is not nervous/anxious. All other systems reviewed and are negative. Current Outpatient Medications on File Prior to Visit   Medication Sig Dispense Refill    doxycycline hyclate (VIBRA-TABS) 100 MG tablet TAKE 1 TABLET BY MOUTH IN THE MORNING AND IN THE EVENING WITH FOOD      ciclopirox (LOPROX) 0.77 % cream APPLY TWICE DAILY TO BOTH FEET/LEGS      potassium chloride (KLOR-CON M) 20 MEQ extended release tablet Take 20 mEq by mouth 3 times daily      Insulin Glargine, 2 Unit Dial, (TOUJEO MAX SOLOSTAR) 300 UNIT/ML SOPN Inject 58 Units into the skin daily      insulin lispro, 1 Unit Dial, (HUMALOG KWIKPEN) 100 UNIT/ML SOPN Inject 5 Units into the skin 3 times daily      HYDROcodone-acetaminophen (NORCO) 7.5-325 MG per tablet Take 1 tablet by mouth every 6 hours as needed for Pain for up to 30 days.  Intended supply: 30 days 120 tablet 0    B-D UF III MINI PEN NEEDLES 31G X 5 MM MISC USE TWICE DAILY 100 each 5    metOLazone (ZAROXOLYN) 2.5 MG tablet Take 1 tablet by mouth three times a week 36 tablet 1    bumetanide (BUMEX) 1 MG tablet TAKE 1 TABLET BY MOUTH TWICE DAILY 180 tablet 1    atorvastatin (LIPITOR) 20 MG tablet TAKE 1 TABLET BY MOUTH EVERY MORNING 90 tablet 1    Continuous Blood Gluc  (FREESTYLE MEDHAT 2 READER) GERALD Use device to check BS 3-4x/day and PRN for abnormal BS 1 each 5    JARDIANCE 25 MG tablet Take 25 mg by mouth Daily 90 tablet 1    Handicap Placard MISC by Does not apply route Duration:  Lifetime  Exp: 10/2026 1 each 0    losartan (COZAAR) 50 MG tablet TAKE 1 TABLET BY MOUTH DAILY 90 tablet 1    metoprolol tartrate (LOPRESSOR) 50 MG tablet TAKE 1 TABLET BY MOUTH TWICE DAILY 180 tablet 1    glimepiride (AMARYL) 4 MG tablet TAKE 1 TABLET BY MOUTH EVERY MORNING BEFORE BREAKFAST 90 tablet 0    fluticasone (FLONASE) 50 MCG/ACT nasal spray 1 spray by Each Nostril route daily 3 Bottle 3    albuterol sulfate  (90 Base) MCG/ACT inhaler Inhale 2 puffs into the lungs 4 times daily as needed (2 puffs) 1 Inhaler 3    Continuous Blood Gluc Sensor (FREESTYLE MEDHAT 2 SENSOR) MISC Use as directed to check blood sugar 4x/day and PRN 1 each 5    Ferrous Sulfate (IRON) 325 (65 Fe) MG TABS Take 325 mg by mouth every other day 90 tablet 3    Ascorbic Acid (VITAMIN C) 250 MG tablet Take 250 mg by mouth daily       No current facility-administered medications on file prior to visit.        Allergies   Allergen Reactions    Cleocin [Clindamycin Hcl]     Codeine     Eucerin [Albolene]     Glucophage [Metformin Hcl]     Januvia [Sitagliptin]     Lisinopril     Zithromax [Azithromycin]     Ciprofloxacin Hcl Swelling and Rash    Keflex [Cephalexin] Swelling and Rash       Past Medical History:   Diagnosis Date    DDD (degenerative disc disease), cervical     DDD (degenerative disc disease), thoracic     Diabetes mellitus ([address]     Follicular adenocarcinoma, moderately differentiated ([address]     rectum     Hyperlipidemia     Hypertension     Obesity     morbid     NICOLLE (obstructive sleep apnea)     Peripheral edema     Stasis dermatitis     Thyroid nodule     Vitamin D insufficiency      Past Surgical History:   Procedure Laterality Date    CERVICAL FUSION      COLONOSCOPY      EYE SURGERY      cataract with lol implants - OU    HERNIA REPAIR      umbilical     RECTAL SURGERY      transanal excision rectal cancer     SIGMOIDOSCOPY      procotoscopy     UPPER GASTROINTESTINAL ENDOSCOPY  06/15/2020    Radha- erosive gastritis     Family History   Problem Relation Age of Onset    Cancer Mother         breast     Diabetes Father     Diabetes Brother     Heart Attack Brother     Other Brother         valvular heart disease    Cancer Brother         lung      Social History     Socioeconomic History    Marital status:      Spouse name: Not on file    Number of children: Not on file    Years of education: Not on file    Highest education level: Not on file   Occupational History    Not on file   Tobacco Use    Smoking status: Never Smoker    Smokeless tobacco: Never Used   Vaping Use    Vaping Use: Never used   Substance and Sexual Activity    Alcohol use: Never    Drug use: Never    Sexual activity: Not on file   Other Topics Concern    Not on file   Social History Narrative    Not on file     Social Determinants of Health     Financial Resource Strain: Low Risk     Difficulty of Paying Living Expenses: Not very hard   Food Insecurity: No Food Insecurity    Worried About Running Out of Food in the Last Year: Never true    [address] in the Last Year: Never true   Transportation Needs:     Lack of Transportation (Medical): Not on file    Lack of Transportation (Non-Medical):  Not on file   Physical Activity:     Days of Exercise per Week: Not on file    Minutes of Exercise per Session: Not on file   Stress:     Feeling of Stress : Not on file   Social Connections:     Frequency of Communication with Friends and Family: Not on file    Frequency of Social Gatherings with Friends and Family: Not on file    Attends Orthodox Services: Not on file    Active Member of Clubs or Organizations: Not on file    Attends Club or Organization Meetings: Not on file    Marital Status: Not on file   Intimate Partner Violence:     Fear of Current or Ex-Partner: Not on file    Emotionally Abused: Not on file    Physically Abused: Not on file    Sexually Abused: Not on file   Housing Stability:     Unable to Pay for Housing in the Last Year: Not on file    Number of Places Lived in the Last Year: Not on file    Unstable Housing in the Last Year: Not on file     There were no vitals filed for this visit. Physical Exam:  Physical Exam  Vitals and nursing note reviewed. Constitutional:       General: He is not in acute distress. Appearance: Normal appearance. He is well-developed. He is morbidly obese. He is not ill-appearing. HENT:      Head: Normocephalic and atraumatic. Right Ear: Hearing and external ear normal.      Left Ear: Hearing and external ear normal.      Nose: Nose normal.   Eyes:      General: Lids are normal. No scleral icterus. Extraocular Movements: Extraocular movements intact. Conjunctiva/sclera: Conjunctivae normal.   Pulmonary:      Effort: Pulmonary effort is normal. No respiratory distress. Breath sounds: No wheezing. Musculoskeletal:         General: Normal range of motion. Cervical back: Normal range of motion. Skin:     Findings: No rash. Neurological:      Mental Status: He is alert and oriented to person, place, and time. Psychiatric:         Mood and Affect: Mood and affect normal.         Speech: Speech normal.         Behavior: Behavior normal.         Thought Content:  Thought content normal.           Labs:  CBC with Differential:    Lab Results   Component Value Date    WBC 8.2 10/06/2021    RBC 5.49 10/06/2021    HGB 15.4 10/06/2021    HCT 46.4 10/06/2021     10/06/2021    MCV 84.5 10/06/2021    MCH 28.1 10/06/2021    MCHC 33.3 10/06/2021    RDW 16.4 10/06/2021    SEGSPCT 70.9 10/06/2021    LYMPHOPCT 19.2 10/06/2021    MONOPCT 8.0 10/06/2021    EOSPCT 1.9 03/16/2021    BASOPCT 0.5 10/06/2021    MONOSABS 0.7 10/06/2021    LYMPHSABS 1.6 10/06/2021    EOSABS 0.1 10/06/2021 BASOSABS 0.0 10/06/2021     CMP:    Lab Results   Component Value Date     10/06/2021    K 3.0 10/06/2021    CL 93 10/06/2021    CO2 30 10/06/2021    BUN 32 10/06/2021    CREATININE 1.0 10/06/2021    GFRAA 117 02/18/2020    AGRATIO 1.1 10/06/2021    LABGLOM 74 10/06/2021    GLUCOSE 169 10/06/2021    PROT 7.2 10/06/2021    LABALBU 3.7 10/06/2021    CALCIUM 9.3 10/06/2021    BILITOT 0.8 10/06/2021    ALKPHOS 70 10/06/2021    AST 18 10/06/2021    ALT 19 10/06/2021     HgBA1c:    Lab Results   Component Value Date    LABA1C 9.1 10/06/2021     Microalbumen/Creatinine ratio:  No components found for: RUCREAT  FLP:    Lab Results   Component Value Date    TRIG 149 10/06/2021    HDL 31 10/06/2021    LDLCALC 73 03/16/2021    LABVLDL 25 08/26/2019     TSH:    Lab Results   Component Value Date    TSH 0.87 10/06/2021     PSA:   Lab Results   Component Value Date    PSA 3.37 03/16/2021        Assessment and Plan:  Neena Lopez was seen today for diabetes and medication refill. Diagnoses and all orders for this visit:    Type 2 diabetes mellitus with hyperglycemia, with long-term current use of insulin (HCC)  -     CBC Auto Differential; Future  -     Comprehensive Metabolic Panel; Future  -     Hemoglobin A1C; Future  -     Lipid Panel; Future  -     TSH without Reflex; Future  -     Vitamin B12 & Folate; Future  -     Urinalysis; Future  -     Microalbumin / Creatinine Urine Ratio; Future  Uncontrolled. Due for repeat labs. Will mail to [facility] to complete with Isela's labs. Chronic congestive heart failure with left ventricular diastolic dysfunction (HCC)  Stable per cardio. Chronic systolic congestive heart failure (HCC)    Atrial fibrillation with controlled ventricular response ([address]  Plan for possible cardioversion per cardio    Essential hypertension  Unable to determine via VV    Lumbar disc disease with radiculopathy  -     HYDROcodone-acetaminophen (NORCO) 7.5-325 MG per tablet;  Take 1 tablet by mouth every 6 hours as needed for Pain for up to 30 days. Intended supply: 30 days  -     HYDROcodone-acetaminophen (NORCO) 7.5-325 MG per tablet; Take 1 tablet by mouth every 6 hours as needed for Pain for up to 30 days. Intended supply: 30 days  -     HYDROcodone-acetaminophen (NORCO) 7.5-325 MG per tablet; Take 1 tablet by mouth every 6 hours as needed for Pain for up to 30 days. Intended supply: 30 days  OARRS reviewed. Just filled 2/1/22. Will post date    Anxiety disorder due to medical condition  -     LORazepam (ATIVAN) 1 MG tablet; Take 1 tablet by mouth 2 times daily as needed for Anxiety for up to 90 days. Vitamin D deficiency  -     Vitamin D 25 Hydroxy; Future          Return in about 3 months (around 5/8/2022), or if symptoms worsen or fail to improve, for Chronic medical conditions, Chronic pain medication refills.       Seen By:  Quinton Zabala, DO

## 2022-02-28 RX ORDER — METOPROLOL TARTRATE 50 MG/1
TABLET, FILM COATED ORAL
Qty: 180 TABLET | Refills: 1 | Status: SHIPPED
Start: 2022-02-28 | End: 2022-08-30

## 2022-03-09 ENCOUNTER — TELEPHONE (OUTPATIENT)
Dept: PRIMARY CARE CLINIC | Age: 72
End: 2022-03-09

## 2022-03-09 NOTE — TELEPHONE ENCOUNTER
----- Message from Angelo Choi sent at 3/9/2022 11:21 AM EST -----  Subject: Appointment Request    Reason for Call: Routine (Patient Request) No Script    QUESTIONS  Type of Appointment? Established Patient  Reason for appointment request? Available appointments did not meet   patient need  Additional Information for Provider? PT needs to come in for an appt to   have papers filled out by PCP in regards to him still needing his oxygen   tanks at home. Pt needs the appt ASAP so they do not charge him over 300   dollars a month for his tanks. Next available was 4/26 or 5/20. Please   advise.   ---------------------------------------------------------------------------  --------------  CALL BACK INFO  What is the best way for the office to contact you? OK to leave message on   voicemail  Preferred Call Back Phone Number? [phone]  ---------------------------------------------------------------------------  --------------  SCRIPT ANSWERS  Relationship to Patient? Other  Representative Name? Dewayne Forgrowan  Additional information verified (besides Name and Date of Birth)? Phone   Number  (Is the patient requesting to see the provider for a procedure?)? No  (Is the patient requesting to see the provider urgently  today or   tomorrow. )? No  Have you been diagnosed with, awaiting test results for, or told that you   are suspected of having COVID-19 (Coronavirus)? (If patient has tested   negative or was tested as a requirement for work, school, or travel and   not based on symptoms, answer no)? No  Within the past 10 days have you developed any of the following symptoms   (answer no if symptoms have been present longer than 10 days or began   more than 10 days ago)?  Fever or Chills, Cough, Shortness of breath or   difficulty breathing, Loss of taste or smell, Sore throat, Nasal   congestion, Sneezing or runny nose, Fatigue or generalized body aches   (answer no if pain is specific to a body part e.g. back pain), Diarrhea, Headache? No  Have you had close contact with someone with COVID-19 in the last 7 days? No  (Service Expert  click yes below to proceed with [email or web address] As [employer]ual   Scheduling)?  Yes

## 2022-03-15 ENCOUNTER — OFFICE VISIT (OUTPATIENT)
Dept: PRIMARY CARE CLINIC | Age: 72
End: 2022-03-15
Payer: MEDICARE

## 2022-03-15 VITALS
HEART RATE: 55 BPM | SYSTOLIC BLOOD PRESSURE: 132 MMHG | HEIGHT: 70 IN | DIASTOLIC BLOOD PRESSURE: 78 MMHG | OXYGEN SATURATION: 97 % | WEIGHT: 315 LBS | BODY MASS INDEX: 45.1 KG/M2 | TEMPERATURE: 97.3 F

## 2022-03-15 DIAGNOSIS — E11.65 TYPE 2 DIABETES MELLITUS WITH HYPERGLYCEMIA, WITHOUT LONG-TERM CURRENT USE OF INSULIN (HCC): ICD-10-CM

## 2022-03-15 DIAGNOSIS — Z01.818 PRE-OP EXAMINATION: ICD-10-CM

## 2022-03-15 DIAGNOSIS — I48.91 ATRIAL FIBRILLATION WITH CONTROLLED VENTRICULAR RESPONSE (HCC): ICD-10-CM

## 2022-03-15 DIAGNOSIS — C44.91 NODULAR BASAL CELL CARCINOMA (BCC): Primary | ICD-10-CM

## 2022-03-15 DIAGNOSIS — I50.32 CHRONIC CONGESTIVE HEART FAILURE WITH LEFT VENTRICULAR DIASTOLIC DYSFUNCTION (HCC): ICD-10-CM

## 2022-03-15 PROCEDURE — 99214 OFFICE O/P EST MOD 30 MIN: CPT | Performed by: FAMILY MEDICINE

## 2022-03-15 RX ORDER — SEMAGLUTIDE 1.34 MG/ML
INJECTION, SOLUTION SUBCUTANEOUS
COMMUNITY
Start: 2022-02-23 | End: 2022-10-31

## 2022-03-15 ASSESSMENT — ENCOUNTER SYMPTOMS
COLOR CHANGE: 1
ABDOMINAL PAIN: 0
VOMITING: 0
SHORTNESS OF BREATH: 0
COUGH: 0
DIARRHEA: 0
BACK PAIN: 1
CONSTIPATION: 0
WHEEZING: 0
NAUSEA: 0

## 2022-03-15 NOTE — PROGRESS NOTES
3/15/22  Ramesh Paulino   : 1950 Sex: male  Age: 73 y.o. No chief complaint on file. HPI:  73 y.o. male patient presents today for 3 month(s) follow up of chronic medical conditions, medication refills and FBW. Patient's chart, medical, surgical and medication history all reviewed. Diabetes Mellitus  73 y.o. male presents for follow up of type 2 diabetes. Current diabetic medications include: oral agents (dual therapy): glimepiride (Amaryl), Jardiance, insulin injections: Toujeo and SSI prn for BS >200 and Ozempic. Patient seen by [facility] at Dr. Ciro Shone office and had Bydureon changed to [address] and added Toujeo. Previous medications tried include: oral agents (triple therapy): metformin (generic), pioglitazone (Actos), sitagliptin (Januvia) and insulin injections: Lantus : nightly, but failed due to various reasons. Most recent HgA1c was 9.1% (2021)---9.8% (2021)---6.7% (2020)--- 6.5% (2020)---7.4% (2019). His most recent TSH was 0.87. LDL is 72. Renal function is improved. The patient has evidence of end organ damage including nephropathy, peripheral neuropathy and peripheral vascular disease. He does not see a Podiatrist for foot care . Last eye exam was unknown. Hypertension   The patient presents today for follow up of HTN. The problem is unable to determine via VV. Risk factors for coronary artery disease include Age > 27, male, HTN, diabetes and elevated cholesterol. Current treatments include bumetanide (Bumex), losartan (Cozaar), metolazone (Zaroxolyn) and metoprolol (Lopressor, Toprol). The patient is compliant all of the time. Lifestyle changes the patient has made include none. Today the patient is complaining of peripheral edema. He does wear compression stockings. He has a history of CHF which required hospitalization in 2020. Recently seen by Dr. Malvin Oseguera who checked Echo in January.   No change from 2020.  Planning possible ablation this Summer 2022. Hyperlipidemia  The 10-year ASCVD risk score (Zora Sis., et al., 2013) is: 34.9%    Values used to calculate the score:      Age: 73 years      Sex: Male      Is Non- : No      Diabetic: Yes      Tobacco smoker: No      Systolic Blood Pressure: 191 mmHg      Is BP treated: Yes      HDL Cholesterol: 31 mg/dL      Total Cholesterol: 131 mg/dL    Back pain  Patient has a history of ongoing lumbar back pain. Significant neurologic dysfunction due to back issues. Unable to walk due to the weakness of lower extremities- wheelchair bound. Only able to walk approximately 20 feet. ROS:  Review of Systems   Constitutional: Negative for chills, fatigue and fever. Respiratory: Negative for cough, shortness of breath and wheezing. Cardiovascular: Positive for leg swelling. Negative for chest pain and palpitations. Gastrointestinal: Negative for abdominal pain, constipation, diarrhea, nausea and vomiting. Musculoskeletal: Positive for arthralgias, back pain and gait problem. Skin: Positive for color change. Negative for rash. Neurological: Positive for weakness and numbness. Negative for dizziness and headaches. Psychiatric/Behavioral: Negative for dysphoric mood. The patient is not nervous/anxious. All other systems reviewed and are negative.        Current Outpatient Medications on File Prior to Visit   Medication Sig Dispense Refill    OZEMPIC, 1 MG/DOSE, 4 MG/3ML SOPN INJECT 1MG SUBCUTANEOUSLY EVERY WEEK      metoprolol tartrate (LOPRESSOR) 50 MG tablet TAKE 1 TABLET BY MOUTH TWICE DAILY 180 tablet 1    ciclopirox (LOPROX) 0.77 % cream APPLY TWICE DAILY TO BOTH FEET/LEGS      potassium chloride (KLOR-CON M) 20 MEQ extended release tablet Take 20 mEq by mouth 3 times daily      Insulin Glargine, 2 Unit Dial, (TOUJEO MAX SOLOSTAR) 300 UNIT/ML SOPN Inject 58 Units into the skin daily      insulin lispro, 1 Unit Dial, (HUMALOG KWIKPEN) 100 UNIT/ML SOPN Inject 5 Units into the skin 3 times daily      [START ON 3/29/2022] HYDROcodone-acetaminophen (NORCO) 7.5-325 MG per tablet Take 1 tablet by mouth every 6 hours as needed for Pain for up to 30 days. Intended supply: 30 days 120 tablet 0    HYDROcodone-acetaminophen (NORCO) 7.5-325 MG per tablet Take 1 tablet by mouth every 6 hours as needed for Pain for up to 30 days. Intended supply: 30 days 120 tablet 0    LORazepam (ATIVAN) 1 MG tablet Take 1 tablet by mouth 2 times daily as needed for Anxiety for up to 90 days. 180 tablet 0    [START ON 4/26/2022] HYDROcodone-acetaminophen (NORCO) 7.5-325 MG per tablet Take 1 tablet by mouth every 6 hours as needed for Pain for up to 30 days.  Intended supply: 30 days 120 tablet 0    B-D UF III MINI PEN NEEDLES 31G X 5 MM MISC USE TWICE DAILY 100 each 5    metOLazone (ZAROXOLYN) 2.5 MG tablet Take 1 tablet by mouth three times a week 36 tablet 1    bumetanide (BUMEX) 1 MG tablet TAKE 1 TABLET BY MOUTH TWICE DAILY 180 tablet 1    atorvastatin (LIPITOR) 20 MG tablet TAKE 1 TABLET BY MOUTH EVERY MORNING 90 tablet 1    Continuous Blood Gluc  (FREESTYLE MEDHAT 2 READER) GERALD Use device to check BS 3-4x/day and PRN for abnormal BS 1 each 5    JARDIANCE 25 MG tablet Take 25 mg by mouth Daily 90 tablet 1    Handicap Placard MISC by Does not apply route Duration:  Lifetime  Exp: 10/2026 1 each 0    losartan (COZAAR) 50 MG tablet TAKE 1 TABLET BY MOUTH DAILY 90 tablet 1    glimepiride (AMARYL) 4 MG tablet TAKE 1 TABLET BY MOUTH EVERY MORNING BEFORE BREAKFAST 90 tablet 0    fluticasone (FLONASE) 50 MCG/ACT nasal spray 1 spray by Each Nostril route daily 3 Bottle 3    albuterol sulfate  (90 Base) MCG/ACT inhaler Inhale 2 puffs into the lungs 4 times daily as needed (2 puffs) 1 Inhaler 3    Continuous Blood Gluc Sensor (FREESTYLE MEDHAT 2 SENSOR) MISC Use as directed to check blood sugar 4x/day and PRN 1 each 5    Ferrous Sulfate (IRON) 325 (65 Fe) MG TABS Take 325 mg by mouth every other day 90 tablet 3    Ascorbic Acid (VITAMIN C) 250 MG tablet Take 250 mg by mouth daily       No current facility-administered medications on file prior to visit.        Allergies   Allergen Reactions    Cleocin [Clindamycin Hcl]     Codeine     Eucerin [Albolene]     Glucophage [Metformin Hcl]     Januvia [Sitagliptin]     Lisinopril     Zithromax [Azithromycin]     Ciprofloxacin Hcl Swelling and Rash    Keflex [Cephalexin] Swelling and Rash       Past Medical History:   Diagnosis Date    DDD (degenerative disc disease), cervical     DDD (degenerative disc disease), thoracic     Diabetes mellitus ([address]     Follicular adenocarcinoma, moderately differentiated ([address]     rectum     Hyperlipidemia     Hypertension     Obesity     morbid     NICOLLE (obstructive sleep apnea)     Peripheral edema     Stasis dermatitis     Thyroid nodule     Vitamin D insufficiency      Past Surgical History:   Procedure Laterality Date    CERVICAL FUSION      COLONOSCOPY      EYE SURGERY      cataract with lol implants - OU    HERNIA REPAIR      umbilical     RECTAL SURGERY      transanal excision rectal cancer     SIGMOIDOSCOPY      procotoscopy     UPPER GASTROINTESTINAL ENDOSCOPY  06/15/2020    Radha- erosive gastritis     Family History   Problem Relation Age of Onset    Cancer Mother         breast     Diabetes Father     Diabetes Brother     Heart Attack Brother     Other Brother         valvular heart disease    Cancer Brother         lung      Social History     Socioeconomic History    Marital status:      Spouse name: Not on file    Number of children: Not on file    Years of education: Not on file    Highest education level: Not on file   Occupational History    Not on file   Tobacco Use    Smoking status: Never Smoker    Smokeless tobacco: Never Used   Vaping Use    Vaping Use: Never used   Substance and Sexual Activity    Alcohol use: Never    Drug use: Never    Sexual activity: Not on file   Other Topics Concern    Not on file   Social History Narrative    Not on file     Social Determinants of Health     Financial Resource Strain: Low Risk     Difficulty of Paying Living Expenses: Not very hard   Food Insecurity: No Food Insecurity    Worried About Running Out of Food in the Last Year: Never true    Linda of Food in the Last Year: Never true   Transportation Needs:     Lack of Transportation (Medical): Not on file    Lack of Transportation (Non-Medical): Not on file   Physical Activity:     Days of Exercise per Week: Not on file    Minutes of Exercise per Session: Not on file   Stress:     Feeling of Stress : Not on file   Social Connections:     Frequency of Communication with Friends and Family: Not on file    Frequency of Social Gatherings with Friends and Family: Not on file    Attends Synagogue Services: Not on file    Active Member of [address] [place] [address] or Organizations: Not on file    Attends Club or Organization Meetings: Not on file    Marital Status: Not on file   Intimate Partner Violence:     Fear of Current or Ex-Partner: Not on file    Emotionally Abused: Not on file    Physically Abused: Not on file    Sexually Abused: Not on file   Housing Stability:     Unable to Pay for Housing in the Last Year: Not on file    Number of [place] in the Last Year: Not on file    Unstable Housing in the Last Year: Not on file     There were no vitals filed for this visit. Physical Exam:  Physical Exam  Vitals and nursing note reviewed. Constitutional:       General: He is not in acute distress. Appearance: Normal appearance. He is well-developed. He is morbidly obese. He is not ill-appearing. HENT:      Head: Normocephalic and atraumatic.       Right Ear: Hearing and external ear normal.      Left Ear: Hearing and external ear normal.      Nose: Nose normal.   Eyes:      General: Lids are normal. No scleral icterus. Extraocular Movements: Extraocular movements intact. Conjunctiva/sclera: Conjunctivae normal.   Pulmonary:      Effort: Pulmonary effort is normal. No respiratory distress. Breath sounds: No wheezing. Musculoskeletal:         General: Normal range of motion. Cervical back: Normal range of motion. Skin:     Findings: No rash. Neurological:      Mental Status: He is alert and oriented to person, place, and time. Psychiatric:         Mood and Affect: Mood and affect normal.         Speech: Speech normal.         Behavior: Behavior normal.         Thought Content:  Thought content normal.           Labs:  CBC with Differential:    Lab Results   Component Value Date    WBC 8.2 10/06/2021    RBC 5.49 10/06/2021    HGB 15.4 10/06/2021    HCT 46.4 10/06/2021     10/06/2021    MCV 84.5 10/06/2021    MCH 28.1 10/06/2021    MCHC 33.3 10/06/2021    RDW 16.4 10/06/2021    SEGSPCT 70.9 10/06/2021    LYMPHOPCT 19.2 10/06/2021    MONOPCT 8.0 10/06/2021    EOSPCT 1.9 03/16/2021    BASOPCT 0.5 10/06/2021    MONOSABS 0.7 10/06/2021    LYMPHSABS 1.6 10/06/2021    EOSABS 0.1 10/06/2021    BASOSABS 0.0 10/06/2021     CMP:    Lab Results   Component Value Date     10/06/2021    K 3.0 10/06/2021    CL 93 10/06/2021    CO2 30 10/06/2021    BUN 32 10/06/2021    CREATININE 1.0 10/06/2021    GFRAA 117 02/18/2020    AGRATIO 1.1 10/06/2021    LABGLOM 74 10/06/2021    GLUCOSE 169 10/06/2021    PROT 7.2 10/06/2021    LABALBU 3.7 10/06/2021    CALCIUM 9.3 10/06/2021    BILITOT 0.8 10/06/2021    ALKPHOS 70 10/06/2021    AST 18 10/06/2021    ALT 19 10/06/2021     HgBA1c:    Lab Results   Component Value Date    LABA1C 9.1 10/06/2021     Microalbumen/Creatinine ratio:  No components found for: RUCREAT  FLP:    Lab Results   Component Value Date    TRIG 149 10/06/2021    HDL 31 10/06/2021    LDLCALC 73 03/16/2021    LABVLDL 25 08/26/2019     TSH:    Lab Results   Component Value Date TSH 0.87 10/06/2021     PSA:   Lab Results   Component Value Date    PSA 3.37 03/16/2021        Assessment and Plan:  Alyssa Astudillo was seen today for diabetes and medication refill. Diagnoses and all orders for this visit:    Type 2 diabetes mellitus with hyperglycemia, with long-term current use of insulin (HCC)  -     CBC Auto Differential; Future  -     Comprehensive Metabolic Panel; Future  -     Hemoglobin A1C; Future  -     Lipid Panel; Future  -     TSH without Reflex; Future  -     Vitamin B12 & Folate; Future  -     Urinalysis; Future  -     Microalbumin / Creatinine Urine Ratio; Future  Uncontrolled. Due for repeat labs. Will mail to [facility] to complete with Isela's labs. Chronic congestive heart failure with left ventricular diastolic dysfunction (HCC)  Stable per cardio. Chronic systolic congestive heart failure (HCC)    Atrial fibrillation with controlled ventricular response ([address]  Plan for possible cardioversion per cardio    Essential hypertension  Unable to determine via VV    Lumbar disc disease with radiculopathy  -     HYDROcodone-acetaminophen (NORCO) 7.5-325 MG per tablet; Take 1 tablet by mouth every 6 hours as needed for Pain for up to 30 days. Intended supply: 30 days  -     HYDROcodone-acetaminophen (NORCO) 7.5-325 MG per tablet; Take 1 tablet by mouth every 6 hours as needed for Pain for up to 30 days. Intended supply: 30 days  -     HYDROcodone-acetaminophen (NORCO) 7.5-325 MG per tablet; Take 1 tablet by mouth every 6 hours as needed for Pain for up to 30 days. Intended supply: 30 days  OARRS reviewed. Just filled 2/1/22. Will post date    Anxiety disorder due to medical condition  -     LORazepam (ATIVAN) 1 MG tablet; Take 1 tablet by mouth 2 times daily as needed for Anxiety for up to 90 days. Vitamin D deficiency  -     Vitamin D 25 Hydroxy; Future          No follow-ups on file.       Seen By:  Sirena Babb DO

## 2022-03-15 NOTE — PROGRESS NOTES
3/15/22  Ramesh Paulino : 1950 Sex: male  Age: 73 y.o. Chief Complaint   Patient presents with    Pre-op Exam     Cancer removal with Dr. Shawn Robins     HPI:  73 y.o. male presents today for pre-op exam.  Patient's chart, medical, surgical and medication history all reviewed. Pre-Operative Risk assessment using 2014 ACC/AHA guidelines   Procedure: Wide excision of cancer from R LE   Date of procedure:  Not yet scheduled  Surgeon: Dr. Maria G Flores  Emergent procedure No  Active Cardiac Condition Yes (decompensated HF, Arrhythmia, MI <3 weeks, severe valve disease)  Risk Level of Procedure Low Risk (endoscopy, superficial skin, breast, ambulatory, or cataract, etc.)  Revised Cardiac Risk Index Risk factors: History of heart failure  Diabetic treated with insulin  Measurement of Exercise Tolerance before Surgery >4 No    According to the 2014 ACC/AHA pre-operative risk assessment guidelines Ramesh Paulino is a moderate risk for major cardiac complications during a low risk procedure and may continue as planned. Specific medication recommendations are listed below. Medications recommended to continue should be taken with a sip of water even when NPO. According to Lew[place]raham Louis perioperative risk calculation, there is a 0.51% risk of myocardial infarction or cardiac arrest, intraoperatively or up to 30 days post-op. Further recommendations from consultants: Cardiology    Medication Recommendations:  ACEI/ARB Hold one dose piror to surgery  Betablocker should be continued the day of surgery   Diuretics HOLD the morning dose on the day of surgery  Statins should be continued the day of surgery  Diabetes Oral hypoglycemic agents - hold oral hypoglycemic agents for the morning of surgery. Resume oral hypoglycemic agents post procedure with diet  Insulin Long acting insulin continue any long acting insulin (glargine or degludec) with a 30-50% reduction in dose the last dose pre-procedure.  I recommend the patient take 20 units      ROS:  Review of Systems   Constitutional: Negative for chills, fatigue and fever. Respiratory: Negative for cough, shortness of breath and wheezing. Cardiovascular: Positive for leg swelling. Negative for chest pain and palpitations. Gastrointestinal: Negative for abdominal pain, constipation, diarrhea, nausea and vomiting. Musculoskeletal: Positive for arthralgias, back pain and gait problem. Skin: Positive for color change and wound. Negative for rash. Neurological: Positive for weakness and numbness. Negative for dizziness and headaches. Psychiatric/Behavioral: Negative for dysphoric mood. The patient is not nervous/anxious. All other systems reviewed and are negative. Current Outpatient Medications on File Prior to Visit   Medication Sig Dispense Refill    OZEMPIC, 1 MG/DOSE, 4 MG/3ML SOPN INJECT 1MG SUBCUTANEOUSLY EVERY WEEK      metoprolol tartrate (LOPRESSOR) 50 MG tablet TAKE 1 TABLET BY MOUTH TWICE DAILY 180 tablet 1    ciclopirox (LOPROX) 0.77 % cream APPLY TWICE DAILY TO BOTH FEET/LEGS      potassium chloride (KLOR-CON M) 20 MEQ extended release tablet Take 20 mEq by mouth 3 times daily      Insulin Glargine, 2 Unit Dial, (TOUJEO MAX SOLOSTAR) 300 UNIT/ML SOPN Inject 58 Units into the skin daily      insulin lispro, 1 Unit Dial, (HUMALOG KWIKPEN) 100 UNIT/ML SOPN Inject 5 Units into the skin 3 times daily      [START ON 3/29/2022] HYDROcodone-acetaminophen (NORCO) 7.5-325 MG per tablet Take 1 tablet by mouth every 6 hours as needed for Pain for up to 30 days. Intended supply: 30 days 120 tablet 0    HYDROcodone-acetaminophen (NORCO) 7.5-325 MG per tablet Take 1 tablet by mouth every 6 hours as needed for Pain for up to 30 days. Intended supply: 30 days 120 tablet 0    LORazepam (ATIVAN) 1 MG tablet Take 1 tablet by mouth 2 times daily as needed for Anxiety for up to 90 days.  180 tablet 0    [START ON 4/26/2022] HYDROcodone-acetaminophen (NORCO) 7.5-325 MG per tablet Take 1 tablet by mouth every 6 hours as needed for Pain for up to 30 days. Intended supply: 30 days 120 tablet 0    B-D UF III MINI PEN NEEDLES 31G X 5 MM MISC USE TWICE DAILY 100 each 5    metOLazone (ZAROXOLYN) 2.5 MG tablet Take 1 tablet by mouth three times a week 36 tablet 1    bumetanide (BUMEX) 1 MG tablet TAKE 1 TABLET BY MOUTH TWICE DAILY 180 tablet 1    atorvastatin (LIPITOR) 20 MG tablet TAKE 1 TABLET BY MOUTH EVERY MORNING 90 tablet 1    Continuous Blood Gluc  (FREESTYLE MEDHAT 2 READER) GERALD Use device to check BS 3-4x/day and PRN for abnormal BS 1 each 5    JARDIANCE 25 MG tablet Take 25 mg by mouth Daily 90 tablet 1    Handicap Placard MISC by Does not apply route Duration:  Lifetime  Exp: 10/2026 1 each 0    losartan (COZAAR) 50 MG tablet TAKE 1 TABLET BY MOUTH DAILY 90 tablet 1    glimepiride (AMARYL) 4 MG tablet TAKE 1 TABLET BY MOUTH EVERY MORNING BEFORE BREAKFAST 90 tablet 0    fluticasone (FLONASE) 50 MCG/ACT nasal spray 1 spray by Each Nostril route daily 3 Bottle 3    albuterol sulfate  (90 Base) MCG/ACT inhaler Inhale 2 puffs into the lungs 4 times daily as needed (2 puffs) 1 Inhaler 3    Continuous Blood Gluc Sensor (FREESTYLE MEDHAT 2 SENSOR) MISC Use as directed to check blood sugar 4x/day and PRN 1 each 5    Ferrous Sulfate (IRON) 325 (65 Fe) MG TABS Take 325 mg by mouth every other day 90 tablet 3    Ascorbic Acid (VITAMIN C) 250 MG tablet Take 250 mg by mouth daily       No current facility-administered medications on file prior to visit.        Allergies   Allergen Reactions    Cleocin [Clindamycin Hcl]     Codeine     Eucerin [Albolene]     Glucophage [Metformin Hcl]     Januvia [Sitagliptin]     Lisinopril     Zithromax [Azithromycin]     Ciprofloxacin Hcl Swelling and Rash    Keflex [Cephalexin] Swelling and Rash       Past Medical History:   Diagnosis Date    DDD (degenerative disc disease), cervical     DDD (degenerative disc disease), thoracic     Diabetes mellitus ([address]     Follicular adenocarcinoma, moderately differentiated ([address]     rectum     Hyperlipidemia     Hypertension     Obesity     morbid     NICOLLE (obstructive sleep apnea)     Peripheral edema     Stasis dermatitis     Thyroid nodule     Vitamin D insufficiency      Past Surgical History:   Procedure Laterality Date    CERVICAL FUSION      COLONOSCOPY      EYE SURGERY      cataract with lol implants - OU    HERNIA REPAIR      umbilical     RECTAL SURGERY      transanal excision rectal cancer     SIGMOIDOSCOPY      procotoscopy     UPPER GASTROINTESTINAL ENDOSCOPY  06/15/2020    Radha- erosive gastritis     Family History   Problem Relation Age of Onset    Cancer Mother         breast     Diabetes Father     Diabetes Brother     Heart Attack Brother     Other Brother         valvular heart disease    Cancer Brother         lung      Social History     Socioeconomic History    Marital status:      Spouse name: Not on file    Number of children: Not on file    Years of education: Not on file    Highest education level: Not on file   Occupational History    Not on file   Tobacco Use    Smoking status: Never Smoker    Smokeless tobacco: Never Used   Vaping Use    Vaping Use: Never used   Substance and Sexual Activity    Alcohol use: Never    Drug use: Never    Sexual activity: Not on file   Other Topics Concern    Not on file   Social History Narrative    Not on file     Social Determinants of Health     Financial Resource Strain: Low Risk     Difficulty of Paying Living Expenses: Not very hard   Food Insecurity: No Food Insecurity    Worried About Running Out of Food in the Last Year: Never true    [address] in the Last Year: Never true   Transportation Needs:     Lack of Transportation (Medical): Not on file    Lack of Transportation (Non-Medical):  Not on file   Physical Activity:     Days of Exercise per Week: Not on file   [place] of Exercise per Session: Not on file   Stress:     Feeling of Stress : Not on file   Social Connections:     Frequency of Communication with Friends and Family: Not on file    Frequency of Social Gatherings with Friends and Family: Not on file    Attends Holiness Services: Not on file    Active Member of Clubs or Organizations: Not on file    Attends Club or Organization Meetings: Not on file    Marital Status: Not on file   Intimate Partner Violence:     Fear of Current or Ex-Partner: Not on file    Emotionally Abused: Not on file    Physically Abused: Not on file    Sexually Abused: Not on file   Housing Stability:     Unable to Pay for Housing in the Last Year: Not on file    Number of [place] in the Last Year: Not on file    Unstable Housing in the Last Year: Not on file       Vitals:    03/15/22 0805 03/15/22 0828   BP: (!) 140/82 132/78   Pulse: 55    Temp: 97.3 °F (36.3 °C)    SpO2: 97%    Weight: (!) 363 lb (164.7 kg)    Height: 5' 10\" (1.778 m)        Physical Exam:  Physical Exam  Vitals and nursing note reviewed. Constitutional:       General: He is not in acute distress. Appearance: Normal appearance. He is well-developed. He is morbidly obese. He is not ill-appearing. HENT:      Head: Normocephalic and atraumatic. Right Ear: Hearing and external ear normal.      Left Ear: Hearing and external ear normal.      Nose:      Comments: Wearing mask  Eyes:      General: Lids are normal. No scleral icterus. Extraocular Movements: Extraocular movements intact. Conjunctiva/sclera: Conjunctivae normal.   Neck:      Thyroid: No thyromegaly. Cardiovascular:      Rate and Rhythm: Normal rate. Rhythm irregularly irregular. Heart sounds: Normal heart sounds. No murmur heard. Pulmonary:      Effort: Pulmonary effort is normal. No respiratory distress. Breath sounds: Normal breath sounds. No wheezing.    Musculoskeletal:         General: No tenderness or deformity. Normal range of motion. Cervical back: Normal range of motion and neck supple. Right lower leg: Edema (non-pitting) present. Left lower leg: Edema (non-pitting) present. Lymphadenopathy:      Cervical: No cervical adenopathy. Skin:     General: Skin is warm and dry. Findings: Wound (see photo below) present. No rash. Comments: Skin changes consistent with venous insufficiency on bilateral lower legs   Neurological:      General: No focal deficit present. Mental Status: He is alert and oriented to person, place, and time. Gait: Gait abnormal (antalgic). Psychiatric:         Mood and Affect: Mood and affect normal.         Speech: Speech normal.         Behavior: Behavior normal.         Thought Content:  Thought content normal.             Labs:  CBC with Differential:    Lab Results   Component Value Date    WBC 9.8 03/17/2022    RBC 5.18 03/17/2022    HGB 14.6 03/17/2022    HCT 44.6 03/17/2022     03/17/2022    MCV 86.0 03/17/2022    MCH 28.1 03/17/2022    MCHC 32.7 03/17/2022    RDW 15.9 03/17/2022    SEGSPCT 75.1 03/17/2022    LYMPHOPCT 16.5 03/17/2022    MONOPCT 6.8 03/17/2022    EOSPCT 1.9 03/16/2021    BASOPCT 0.5 03/17/2022    MONOSABS 0.7 03/17/2022    LYMPHSABS 1.6 03/17/2022    EOSABS 0.1 03/17/2022    BASOSABS 0.1 03/17/2022     CMP:    Lab Results   Component Value Date     03/17/2022    K 3.2 03/17/2022    CL 95 03/17/2022    CO2 32 03/17/2022    BUN 26 03/17/2022    CREATININE 1.0 03/17/2022    GFRAA 117 02/18/2020    AGRATIO 1.1 03/17/2022    LABGLOM 74 03/17/2022    GLUCOSE 159 03/17/2022    PROT 6.5 03/17/2022    LABALBU 3.4 03/17/2022    CALCIUM 9.1 03/17/2022    BILITOT 0.7 03/17/2022    ALKPHOS 82 03/17/2022    AST 17 03/17/2022    ALT 18 03/17/2022     U/A:    Lab Results   Component Value Date    COLORU YELLOW 03/17/2022    PROTEINU NEGATIVE 03/17/2022    PHUR 6.0 03/17/2022    WBCUA TRACE 03/17/2022    RBCUA NEGATIVE 03/17/2022    MUCUS MODERATE 02/08/2020    BACTERIA Trace 02/08/2020    LEUKOCYTESUR 0-5 10/06/2021    UROBILINOGEN NEGATIVE 03/17/2022    BILIRUBINUR NEGATIVE 03/17/2022    GLUCOSEU >=500 03/17/2022    AMORPHOUS FEW 02/08/2020     HgBA1c:    Lab Results   Component Value Date    LABA1C 9.1 10/06/2021     Microalbumen/Creatinine ratio:  No components found for: RUCREAT  FLP:    Lab Results   Component Value Date    TRIG 113 03/17/2022    HDL 32 03/17/2022    LDLCALC 73 03/16/2021    LABVLDL 25 08/26/2019     TSH:    Lab Results   Component Value Date    TSH 0.71 03/17/2022     VITAMIN B12: No components found for: B12  FOLATE:    Lab Results   Component Value Date    FOLATE 13.7 03/17/2022     PSA:   Lab Results   Component Value Date    PSA 3.37 03/16/2021        Assessment and Plan:  Migdalia Elmore was seen today for pre-op exam.    Diagnoses and all orders for this visit:    Nodular basal cell carcinoma (BCC)  Patient does not have an appointment scheduled yet. He was seen by Cardio in December and had Echo in February. Echo stable, but need records from cardio. Needs cleared by them prior to approval to move forward with surgery. Pre-op examination    Type 2 diabetes mellitus with hyperglycemia, without long-term current use of insulin (HCC)  -     CBC with Auto Differential  -     Microalbumin / Creatinine Urine Ratio  -     Urinalysis with Reflex Culture  -     Microscopic Urinalysis  -     Comprehensive Metabolic Panel  -     Folate  -     Lipid Panel  -     Vitamin B12  -     TSH  -     Vitamin D 25 Hydroxy  Already had labs ordered. Needs to complete to see what renal function and Hb levels are.      Chronic congestive heart failure with left ventricular diastolic dysfunction (HCC)  Stable per patient from recent visit with cardio    Atrial fibrillation with controlled ventricular response ([address]  Rate controlled today      Return in about 2 months (around 5/15/2022), or if symptoms worsen or fail to improve, for Chronic medical conditions.       Seen By:  Mariel Castro DO

## 2022-03-16 ENCOUNTER — TELEPHONE (OUTPATIENT)
Dept: PRIMARY CARE CLINIC | Age: 72
End: 2022-03-16

## 2022-03-16 NOTE — TELEPHONE ENCOUNTER
Called Dr Salmon Kassidy office to get last OV and see if they did an EKG at his last apt.  If not, he will need to see them to be cleared for surgery

## 2022-03-17 LAB
25-HYDROXY VITAMIN D-3: 53.2 NG/ML (ref 30–100)
A/G RATIO: 1.1 RATIO (ref 1.1–2.2)
ALBUMIN SERPL-MCNC: 3.4 G/DL (ref 3.4–4.8)
ALP BLD-CCNC: 82 U/L (ref 42–121)
ALT SERPL-CCNC: 18 U/L (ref 10–63)
ANION GAP SERPL CALCULATED.3IONS-SCNC: 12 MEQ/L (ref 3–11)
APPEARANCE, BODY FLUID: CLEAR
AST SERPL-CCNC: 17 U/L (ref 10–41)
BASOPHILS ABSOLUTE: 0.1 K/UL (ref 0–0.2)
BASOPHILS RELATIVE PERCENT: 0.5 % (ref 0–1.5)
BILIRUB SERPL-MCNC: 0.7 MG/DL (ref 0.3–1.5)
BILIRUBIN URINE: NEGATIVE
BUN BLDV-MCNC: 26 MG/DL (ref 6–20)
CALCIUM SERPL-MCNC: 9.1 MG/DL (ref 8.5–10.5)
CHLORIDE BLD-SCNC: 95 MEQ/L (ref 98–107)
CHOLESTEROL: 123 MG/DL (ref 140–200)
CO2: 32 MEQ/L (ref 21–31)
COLOR, URINE: YELLOW
CREAT SERPL-MCNC: 1 MG/DL (ref 0.6–1.3)
CREATININE + EGFR PANEL: 90 ML/MIN
CREATININE URINE: 78.8 MG/DL (ref 22–328)
EOSINOPHILS ABSOLUTE: 0.1 K/UL (ref 0–0.33)
EOSINOPHILS RELATIVE PERCENT: 1.1 % (ref 0–3)
ERYTHROCYTES URINE: NORMAL /HPF
FOLATE: 13.7 NG/ML
GFR NON-AFRICAN AMERICAN: 74 ML/MIN
GLOBULIN: 3.1 G/DL (ref 1.9–3.9)
GLUCOSE BLD-MCNC: 159 MG/DL (ref 70–99)
GLUCOSE URINE: >=500 MG/DL
HCT VFR BLD CALC: 44.6 % (ref 41–50)
HDLC SERPL-MCNC: 32 MG/DL (ref 29–71)
HEMOGLOBIN: 14.6 G/DL (ref 13.5–16.5)
KETONES, URINE: NEGATIVE MG/DL
LDL CHOLESTEROL: 69 MG/DL
LDL/HDL RATIO: 2.2 RATIO
LYMPHOCYTES ABSOLUTE: 1.6 K/UL (ref 1.1–4.8)
LYMPHOCYTES RELATIVE PERCENT: 16.5 % (ref 24–44)
MCH RBC QN AUTO: 28.1 PG (ref 28–34)
MCHC RBC AUTO-ENTMCNC: 32.7 G/DL (ref 33–37)
MCV RBC AUTO: 86 FL (ref 80–100)
MICROALBUMIN UR-MCNC: < 2 UG/ML
MICROALBUMIN/CREAT UR-RTO: NORMAL MG/G
MICROSCOPIC URINE: YES
MONOCYTES ABSOLUTE: 0.7 K/UL (ref 0.2–0.7)
MONOCYTES RELATIVE PERCENT: 6.8 % (ref 3.4–9)
NEUTROPHILS ABSOLUTE: 7.4 K/UL (ref 1.83–8.7)
NITRATE, UA: NEGATIVE
PDW BLD-RTO: 15.9 % (ref 10.9–14.3)
PH UA: 6 (ref 4.6–8)
PLATELET # BLD: 287 K/UL (ref 150–450)
PMV BLD AUTO: 8.1 FL (ref 7.4–10.4)
POTASSIUM SERPL-SCNC: 3.2 MEQ/L (ref 3.6–5)
PROTEIN UA: NEGATIVE MG/DL
RBC # BLD: 5.18 M/UL (ref 4.5–5.5)
RBC URINE: NEGATIVE
SEGMENTED NEUTROPHILS RELATIVE PERCENT: 75.1 % (ref 40–74)
SODIUM BLD-SCNC: 139 MEQ/L (ref 135–145)
SPECIFIC GRAVITY, URINE: 1.02 (ref 1–1.03)
TOTAL PROTEIN: 6.5 G/DL (ref 5.9–7.8)
TRANSITIONAL EPITHELIAL: NORMAL /HPF
TRIGL SERPL-MCNC: 113 MG/DL (ref 41–189)
TSH SERPL DL<=0.05 MIU/L-ACNC: 0.71 UIU/ML (ref 0.34–5.6)
UROBILINOGEN, URINE: NEGATIVE MG/DL
VITAMIN B-12: 218 PG/ML (ref 180–914)
WBC # BLD: 9.8 K/UL (ref 4.5–11)
WBC COUNT, UR AUTO: NORMAL /HPF
WBC URINE: ABNORMAL

## 2022-03-18 LAB
ESTIMATED AVERAGE GLUCOSE: 174 MG/DL
HBA1C MFR BLD: 7.7 % (ref 4–6)

## 2022-03-18 ASSESSMENT — ENCOUNTER SYMPTOMS
ABDOMINAL PAIN: 0
BACK PAIN: 1
WHEEZING: 0
NAUSEA: 0
CONSTIPATION: 0
DIARRHEA: 0
COUGH: 0
COLOR CHANGE: 1
SHORTNESS OF BREATH: 0
VOMITING: 0

## 2022-04-26 ENCOUNTER — TELEPHONE (OUTPATIENT)
Dept: PRIMARY CARE CLINIC | Age: 72
End: 2022-04-26

## 2022-04-26 DIAGNOSIS — E11.65 TYPE 2 DIABETES MELLITUS WITH HYPERGLYCEMIA, WITHOUT LONG-TERM CURRENT USE OF INSULIN (HCC): ICD-10-CM

## 2022-04-26 RX ORDER — GLIMEPIRIDE 4 MG/1
4 TABLET ORAL 2 TIMES DAILY
Qty: 180 TABLET | Refills: 1 | Status: SHIPPED | OUTPATIENT
Start: 2022-04-26

## 2022-04-26 RX ORDER — POTASSIUM CHLORIDE 20 MEQ/1
20 TABLET, EXTENDED RELEASE ORAL 3 TIMES DAILY
Qty: 270 TABLET | Refills: 1 | Status: SHIPPED | OUTPATIENT
Start: 2022-04-26

## 2022-04-26 RX ORDER — ATORVASTATIN CALCIUM 20 MG/1
20 TABLET, FILM COATED ORAL DAILY
Qty: 90 TABLET | Refills: 1 | Status: SHIPPED | OUTPATIENT
Start: 2022-04-26

## 2022-04-26 RX ORDER — EMPAGLIFLOZIN 25 MG/1
25 TABLET, FILM COATED ORAL DAILY
Qty: 90 TABLET | Refills: 1 | Status: SHIPPED | OUTPATIENT
Start: 2022-04-26

## 2022-04-26 RX ORDER — METOLAZONE 2.5 MG/1
2.5 TABLET ORAL
Qty: 36 TABLET | Refills: 1 | Status: SHIPPED | OUTPATIENT
Start: 2022-04-27 | End: 2022-07-26

## 2022-04-26 RX ORDER — PANTOPRAZOLE SODIUM 40 MG/1
40 TABLET, DELAYED RELEASE ORAL DAILY
Qty: 90 TABLET | Refills: 1 | Status: SHIPPED | OUTPATIENT
Start: 2022-04-26

## 2022-04-26 NOTE — TELEPHONE ENCOUNTER
Unfortunately, he does not qualify for continuous oxygen based on recently pulse ox testing in the office

## 2022-04-26 NOTE — TELEPHONE ENCOUNTER
Sharath Antonio [phone] called the pt's wife to tell her to contact the office about them not receiving the pt's oxygen orders. I found it scanned into [employer] and advised that it was faxed over to them with a note from Dr Joni Enrique stating \"Pt does not wear continuously wears @ night w/CPAP.  The pt's wife says that he does wear it during the day during the summer if they are going somewhere for a long amount of time

## 2022-05-02 DIAGNOSIS — F06.4 ANXIETY DISORDER DUE TO MEDICAL CONDITION: ICD-10-CM

## 2022-05-02 RX ORDER — LORAZEPAM 1 MG/1
TABLET ORAL
Qty: 180 TABLET | OUTPATIENT
Start: 2022-05-02

## 2022-05-03 DIAGNOSIS — G47.33 OBSTRUCTIVE SLEEP APNEA SYNDROME: Primary | ICD-10-CM

## 2022-05-09 ENCOUNTER — OFFICE VISIT (OUTPATIENT)
Dept: PRIMARY CARE CLINIC | Age: 72
End: 2022-05-09
Payer: MEDICARE

## 2022-05-09 VITALS
BODY MASS INDEX: 45.1 KG/M2 | HEART RATE: 110 BPM | SYSTOLIC BLOOD PRESSURE: 116 MMHG | WEIGHT: 315 LBS | OXYGEN SATURATION: 96 % | TEMPERATURE: 97.7 F | HEIGHT: 70 IN | DIASTOLIC BLOOD PRESSURE: 64 MMHG

## 2022-05-09 DIAGNOSIS — G47.33 OBSTRUCTIVE SLEEP APNEA SYNDROME: ICD-10-CM

## 2022-05-09 DIAGNOSIS — Z12.5 SCREENING FOR PROSTATE CANCER: ICD-10-CM

## 2022-05-09 DIAGNOSIS — I48.91 ATRIAL FIBRILLATION WITH CONTROLLED VENTRICULAR RESPONSE (HCC): ICD-10-CM

## 2022-05-09 DIAGNOSIS — M51.16 LUMBAR DISC DISEASE WITH RADICULOPATHY: ICD-10-CM

## 2022-05-09 DIAGNOSIS — I10 BENIGN ESSENTIAL HYPERTENSION: ICD-10-CM

## 2022-05-09 DIAGNOSIS — E55.9 VITAMIN D DEFICIENCY: ICD-10-CM

## 2022-05-09 DIAGNOSIS — I50.32 CHRONIC CONGESTIVE HEART FAILURE WITH LEFT VENTRICULAR DIASTOLIC DYSFUNCTION (HCC): ICD-10-CM

## 2022-05-09 DIAGNOSIS — E11.65 TYPE 2 DIABETES MELLITUS WITH HYPERGLYCEMIA, WITHOUT LONG-TERM CURRENT USE OF INSULIN (HCC): Primary | ICD-10-CM

## 2022-05-09 DIAGNOSIS — F06.4 ANXIETY DISORDER DUE TO MEDICAL CONDITION: ICD-10-CM

## 2022-05-09 DIAGNOSIS — G47.34 NOCTURNAL HYPOXIA: ICD-10-CM

## 2022-05-09 PROCEDURE — 99214 OFFICE O/P EST MOD 30 MIN: CPT | Performed by: FAMILY MEDICINE

## 2022-05-09 PROCEDURE — 3051F HG A1C>EQUAL 7.0%<8.0%: CPT | Performed by: FAMILY MEDICINE

## 2022-05-09 RX ORDER — HYDROCODONE BITARTRATE AND ACETAMINOPHEN 7.5; 325 MG/1; MG/1
1 TABLET ORAL EVERY 6 HOURS PRN
Qty: 120 TABLET | Refills: 0 | Status: SHIPPED
Start: 2022-07-04 | End: 2022-08-01 | Stop reason: SDUPTHER

## 2022-05-09 RX ORDER — HYDROCODONE BITARTRATE AND ACETAMINOPHEN 7.5; 325 MG/1; MG/1
1 TABLET ORAL EVERY 6 HOURS PRN
Qty: 120 TABLET | Refills: 0 | Status: SHIPPED
Start: 2022-06-06 | End: 2022-08-09 | Stop reason: SDUPTHER

## 2022-05-09 RX ORDER — LORAZEPAM 1 MG/1
1 TABLET ORAL 2 TIMES DAILY PRN
Qty: 180 TABLET | Refills: 0 | Status: SHIPPED
Start: 2022-05-09 | End: 2022-08-09 | Stop reason: SDUPTHER

## 2022-05-09 RX ORDER — HYDROCODONE BITARTRATE AND ACETAMINOPHEN 7.5; 325 MG/1; MG/1
1 TABLET ORAL EVERY 6 HOURS PRN
Qty: 120 TABLET | Refills: 0 | Status: SHIPPED
Start: 2022-05-09 | End: 2022-08-09 | Stop reason: SDUPTHER

## 2022-05-09 RX ORDER — POTASSIUM CHLORIDE 1500 MG/1
TABLET, FILM COATED, EXTENDED RELEASE ORAL
COMMUNITY
Start: 2022-04-23

## 2022-05-09 ASSESSMENT — ENCOUNTER SYMPTOMS
COUGH: 0
ABDOMINAL PAIN: 0
BACK PAIN: 1
VOMITING: 0
WHEEZING: 0
DIARRHEA: 0
NAUSEA: 0
SHORTNESS OF BREATH: 0
COLOR CHANGE: 1
CONSTIPATION: 0

## 2022-05-09 NOTE — PROGRESS NOTES
22  Randell Larios   : 1950 Sex: male  Age: 73 y.o. Chief Complaint   Patient presents with    Diabetes    Medication Refill    Other     needs  pulse ox testing for [facility] medical for oxygen recertification     HPI:  73 y.o. male patient presents today for 3 month(s) follow up of chronic medical conditions, medication refills and FBW. Patient's chart, medical, surgical and medication history all reviewed. Diabetes Mellitus  73 y.o. male presents for follow up of type 2 diabetes. Current diabetic medications include: oral agents (dual therapy): glimepiride (Amaryl), Jardiance, insulin injections: Toujeo and SSI prn for BS >200 and Ozempic. Patient seen by Mary Santiago at Dr. Rachel Flores office and had Bydureon changed to Lg Juan and added Toujeo. Previous medications tried include: oral agents (triple therapy): metformin (generic), pioglitazone (Actos), sitagliptin (Januvia) and insulin injections: Lantus : nightly, but failed due to various reasons. Most recent HgA1c was 7.7% (2022)---9.1% (2021)---9.8% (2021)---6.7% (2020)--- 6.5% (2020)---7.4% (2019). His most recent TSH was 0.71. LDL is 69. Renal function is improved. The patient has evidence of end organ damage including nephropathy, peripheral neuropathy and peripheral vascular disease. He does not see a Podiatrist for foot care . Last eye exam was unknown. Patient now has a "[employer] which has really helped with BS awareness. Hypertension   The patient presents today for follow up of HTN. The problem is well controlled. Risk factors for coronary artery disease include Age > 27, male, HTN, diabetes and elevated cholesterol. Current treatments include bumetanide (Bumex), losartan (Cozaar), metolazone (Zaroxolyn) and metoprolol (Lopressor, Toprol). The patient is compliant all of the time. Lifestyle changes the patient has made include none.   Today the patient is complaining of peripheral edema. He does wear compression stockings. He has a history of CHF which required hospitalization in January 2020. Recently seen by Dr. Mckenna Martinez who checked Echo in January. No change from 2020. Planning possible ablation this Summer 2022. Follow[facility]. Hyperlipidemia  The ASCVD Risk score (Brea Lino et al., 2013) failed to calculate for the following reasons: The valid total cholesterol range is 130 to 320 mg/dL    Back pain  Patient has a history of ongoing lumbar back pain. Significant neurologic dysfunction due to back issues. Unable to walk due to the weakness of lower extremities- wheelchair bound. Only able to walk approximately 20 feet. NICOLLE:   Patient presents for follow up of NICOLLE. Patient is  using CPAP 6-8 hours/night. Patient does use during naps. does use humidifier. No snoring on CPAP. Tolerating mask and pressure well?: Yes. Mask fitting problem including leak?: No  Significant daytime sleepiness?: No  Dry nose or throat?: No   Nocturia?: No  Edema? Yes  BP is well controlled. Headache in am?: No  Weight?: stable    ROS:  Review of Systems   Constitutional: Negative for chills, fatigue and fever. Respiratory: Negative for cough, shortness of breath and wheezing. Cardiovascular: Positive for leg swelling. Negative for chest pain and palpitations. Gastrointestinal: Negative for abdominal pain, constipation, diarrhea, nausea and vomiting. Musculoskeletal: Positive for arthralgias, back pain and gait problem. Skin: Positive for color change. Negative for rash. Neurological: Positive for weakness and numbness. Negative for dizziness and headaches. Psychiatric/Behavioral: Negative for dysphoric mood. The patient is not nervous/anxious. All other systems reviewed and are negative.        Current Outpatient Medications on File Prior to Visit   Medication Sig Dispense Refill    potassium chloride (KLOR-CON M) 20 MEQ TBCR extended release tablet TAKE 1 TABLET BY MOUTH TWICE DAILY      pantoprazole (PROTONIX) 40 MG tablet Take 1 tablet by mouth daily 90 tablet 1    JARDIANCE 25 MG tablet Take 25 mg by mouth Daily 90 tablet 1    glimepiride (AMARYL) 4 MG tablet Take 1 tablet by mouth 2 times daily 180 tablet 1    atorvastatin (LIPITOR) 20 MG tablet Take 1 tablet by mouth daily 90 tablet 1    metOLazone (ZAROXOLYN) 2.5 MG tablet Take 1 tablet by mouth three times a week 36 tablet 1    potassium chloride (KLOR-CON M) 20 MEQ extended release tablet Take 1 tablet by mouth 3 times daily 270 tablet 1    OZEMPIC, 1 MG/DOSE, 4 MG/3ML SOPN INJECT 1MG SUBCUTANEOUSLY EVERY WEEK      metoprolol tartrate (LOPRESSOR) 50 MG tablet TAKE 1 TABLET BY MOUTH TWICE DAILY 180 tablet 1    ciclopirox (LOPROX) 0.77 % cream APPLY TWICE DAILY TO BOTH FEET/LEGS      Insulin Glargine, 2 Unit Dial, (TOUJEO MAX SOLOSTAR) 300 UNIT/ML SOPN Inject 58 Units into the skin daily      insulin lispro, 1 Unit Dial, (HUMALOG KWIKPEN) 100 UNIT/ML SOPN Inject 5 Units into the skin 3 times daily      HYDROcodone-acetaminophen (NORCO) 7.5-325 MG per tablet Take 1 tablet by mouth every 6 hours as needed for Pain for up to 30 days.  Intended supply: 30 days 120 tablet 0    B-D UF III MINI PEN NEEDLES 31G X 5 MM MISC USE TWICE DAILY 100 each 5    bumetanide (BUMEX) 1 MG tablet TAKE 1 TABLET BY MOUTH TWICE DAILY 180 tablet 1    Continuous Blood Gluc  (FREESTYLE MEDHAT 2 READER) GERALD Use device to check BS 3-4x/day and PRN for abnormal BS 1 each 5    Handicap Placard MISC by Does not apply route Duration:  Lifetime  Exp: 10/2026 1 each 0    losartan (COZAAR) 50 MG tablet TAKE 1 TABLET BY MOUTH DAILY 90 tablet 1    fluticasone (FLONASE) 50 MCG/ACT nasal spray 1 spray by Each Nostril route daily 3 Bottle 3    albuterol sulfate  (90 Base) MCG/ACT inhaler Inhale 2 puffs into the lungs 4 times daily as needed (2 puffs) 1 Inhaler 3    Continuous Blood Gluc Sensor (FREESTYLE MEDHAT 2 SENSOR) MISC Use as directed to check blood sugar 4x/day and PRN 1 each 5    Ferrous Sulfate (IRON) 325 (65 Fe) MG TABS Take 325 mg by mouth every other day 90 tablet 3    Ascorbic Acid (VITAMIN C) 250 MG tablet Take 250 mg by mouth daily       No current facility-administered medications on file prior to visit.        Allergies   Allergen Reactions    Cleocin [Clindamycin Hcl]     Codeine     Eucerin [Albolene]     Glucophage [Metformin Hcl]     Januvia [Sitagliptin]     Lisinopril     Zithromax [Azithromycin]     Ciprofloxacin Hcl Swelling and Rash    Keflex [Cephalexin] Swelling and Rash       Past Medical History:   Diagnosis Date    DDD (degenerative disc disease), cervical     DDD (degenerative disc disease), thoracic     Diabetes mellitus ([address]     Follicular adenocarcinoma, moderately differentiated ([address]     rectum     Hyperlipidemia     Hypertension     Obesity     morbid     NICOLLE (obstructive sleep apnea)     Peripheral edema     Stasis dermatitis     Thyroid nodule     Vitamin D insufficiency      Past Surgical History:   Procedure Laterality Date    CERVICAL FUSION      COLONOSCOPY      EYE SURGERY      cataract with lol implants - OU    HERNIA REPAIR      umbilical     RECTAL SURGERY      transanal excision rectal cancer     SIGMOIDOSCOPY      procotoscopy     UPPER GASTROINTESTINAL ENDOSCOPY  06/15/2020    Radha- erosive gastritis     Family History   Problem Relation Age of Onset    Cancer Mother         breast     Diabetes Father     Diabetes Brother     Heart Attack Brother     Other Brother         valvular heart disease    Cancer Brother         lung      Social History     Socioeconomic History    Marital status:      Spouse name: Not on file    Number of children: Not on file    Years of education: Not on file    Highest education level: Not on file   Occupational History    Not on file Tobacco Use    Smoking status: Never Smoker    Smokeless tobacco: Never Used   Vaping Use    Vaping Use: Never used   Substance and Sexual Activity    Alcohol use: Never    Drug use: Never    Sexual activity: Not on file   Other Topics Concern    Not on file   Social History Narrative    Not on file     Social Determinants of Health     Financial Resource Strain: Low Risk     Difficulty of Paying Living Expenses: Not very hard   Food Insecurity: No Food Insecurity    Worried About Running Out of Food in the Last Year: Never true    Linda of Food in the Last Year: Never true   Transportation Needs:     Lack of Transportation (Medical): Not on file    Lack of Transportation (Non-Medical): Not on file   Physical Activity:     Days of Exercise per Week: Not on file    Minutes of Exercise per Session: Not on file   Stress:     Feeling of Stress : Not on file   Social Connections:     Frequency of Communication with Friends and Family: Not on file    Frequency of Social Gatherings with Friends and Family: Not on file    Attends Druze Services: Not on file    Active Member of [address] [place] [address] or Organizations: Not on file    Attends Club or Organization Meetings: Not on file    Marital Status: Not on file   Intimate Partner Violence:     Fear of Current or Ex-Partner: Not on file    Emotionally Abused: Not on file    Physically Abused: Not on file    Sexually Abused: Not on file   Housing Stability:     Unable to Pay for Housing in the Last Year: Not on file    Number of [place] in the Last Year: Not on file    Unstable Housing in the Last Year: Not on file     Vitals:    05/09/22 1129   BP:    Pulse: 110   Temp:    SpO2: 96%        Physical Exam:  Physical Exam  Vitals and nursing note reviewed. Constitutional:       General: He is not in acute distress. Appearance: Normal appearance. He is well-developed. He is morbidly obese. He is not ill-appearing.    HENT:      Head: Normocephalic and atraumatic. Right Ear: Hearing and external ear normal.      Left Ear: Hearing and external ear normal.      Nose:      Comments: Wearing mask  Eyes:      General: Lids are normal. No scleral icterus. Extraocular Movements: Extraocular movements intact. Conjunctiva/sclera: Conjunctivae normal.   Neck:      Thyroid: No thyromegaly. Cardiovascular:      Rate and Rhythm: Normal rate. Rhythm irregularly irregular. Heart sounds: Normal heart sounds. No murmur heard. Pulmonary:      Effort: Pulmonary effort is normal. No respiratory distress. Breath sounds: Normal breath sounds. No wheezing. Musculoskeletal:         General: No tenderness or deformity. Normal range of motion. Cervical back: Normal range of motion and neck supple. Right lower leg: Edema (non-pitting) present. Left lower leg: Edema (non-pitting) present. Lymphadenopathy:      Cervical: No cervical adenopathy. Skin:     General: Skin is warm and dry. Findings: No rash. Comments: Skin changes consistent with venous insufficiency on bilateral lower legs   Neurological:      General: No focal deficit present. Mental Status: He is alert and oriented to person, place, and time. Gait: Gait abnormal (antalgic). Psychiatric:         Mood and Affect: Mood and affect normal.         Speech: Speech normal.         Behavior: Behavior normal.         Thought Content:  Thought content normal.           Labs:  CBC with Differential:    Lab Results   Component Value Date    WBC 9.8 03/17/2022    RBC 5.18 03/17/2022    HGB 14.6 03/17/2022    HCT 44.6 03/17/2022     03/17/2022    MCV 86.0 03/17/2022    MCH 28.1 03/17/2022    MCHC 32.7 03/17/2022    RDW 15.9 03/17/2022    SEGSPCT 75.1 03/17/2022    LYMPHOPCT 16.5 03/17/2022    MONOPCT 6.8 03/17/2022    EOSPCT 1.9 03/16/2021    BASOPCT 0.5 03/17/2022    MONOSABS 0.7 03/17/2022    LYMPHSABS 1.6 03/17/2022    EOSABS 0.1 03/17/2022    BASOSABS 0.1 03/17/2022     CMP:    Lab Results   Component Value Date     03/17/2022    K 3.2 03/17/2022    CL 95 03/17/2022    CO2 32 03/17/2022    BUN 26 03/17/2022    CREATININE 1.0 03/17/2022    GFRAA 117 02/18/2020    AGRATIO 1.1 03/17/2022    LABGLOM 74 03/17/2022    GLUCOSE 159 03/17/2022    PROT 6.5 03/17/2022    LABALBU 3.4 03/17/2022    CALCIUM 9.1 03/17/2022    BILITOT 0.7 03/17/2022    ALKPHOS 82 03/17/2022    AST 17 03/17/2022    ALT 18 03/17/2022     HgBA1c:    Lab Results   Component Value Date    LABA1C 7.7 03/17/2022     Microalbumen/Creatinine ratio:  No components found for: RUCREAT  FLP:    Lab Results   Component Value Date    TRIG 113 03/17/2022    HDL 32 03/17/2022    LDLCALC 73 03/16/2021    LABVLDL 25 08/26/2019     TSH:    Lab Results   Component Value Date    TSH 0.71 03/17/2022     PSA:   Lab Results   Component Value Date    PSA 3.37 03/16/2021        Assessment and Plan:  [place] was seen today for diabetes, medication refill and other. Diagnoses and all orders for this visit:    Type 2 diabetes mellitus with hyperglycemia, without long-term current use of insulin (HCC)  -     CBC with Auto Differential; Future  -     Comprehensive Metabolic Panel; Future  -     Hemoglobin A1C; Future  -     Lipid Panel; Future  -     TSH; Future  -     Vitamin B12 & Folate; Future  -     Urinalysis; Future  -     Microalbumin / Creatinine Urine Ratio; Future  Improved control of BS with use of Freestyle Rolando glucometer. Due for labs in 3 months. Benign essential hypertension  Well controlled    Atrial fibrillation with controlled ventricular response (HCC)  Rate controlled. Follows with Cardio    Chronic congestive heart failure with left ventricular diastolic dysfunction ([address]  Follows with Cardio, will see them in June    Anxiety disorder due to medical condition  -     LORazepam (ATIVAN) 1 MG tablet; Take 1 tablet by mouth 2 times daily as needed for Anxiety for up to 90 days.   OARRS appropriate    Obstructive sleep apnea syndrome  Patient currently using CPAP machine with O2 bled into machine at night due to severity of NICOLLE. Needing [facility] order [facility] O[facility]. Order sent on 5/3/22 for overnight pulse oximetry study. Medically necessary to keep O2 levels above 90% during sleep. No issues during the day. O2 today in the office only dropped to 96% with ambulation on room air. Nocturnal hypoxia    Lumbar disc disease with radiculopathy  -     HYDROcodone-acetaminophen (NORCO) 7.5-325 MG per tablet; Take 1 tablet by mouth every 6 hours as needed for Pain for up to 30 days. Intended supply: 30 days  -     HYDROcodone-acetaminophen (NORCO) 7.5-325 MG per tablet; Take 1 tablet by mouth every 6 hours as needed for Pain for up to 30 days. Intended supply: 30 days  -     HYDROcodone-acetaminophen (NORCO) 7.5-325 MG per tablet; Take 1 tablet by mouth every 6 hours as needed for Pain for up to 30 days. Intended supply: 30 days    Vitamin D deficiency  -     Vitamin D 25 Hydroxy; Future    Screening for prostate cancer  -     PSA Screening; Future    Body mass index (BMI) 50.0-59.9, adult (HCC)          Return in about 3 months (around 8/9/2022), or if symptoms worsen or fail to improve, for AWV.       Seen By:  Ela Luo DO

## 2022-05-31 RX ORDER — BUMETANIDE 1 MG/1
TABLET ORAL
Qty: 180 TABLET | Refills: 1 | Status: SHIPPED | OUTPATIENT
Start: 2022-05-31

## 2022-06-01 DIAGNOSIS — E11.65 TYPE 2 DIABETES MELLITUS WITH HYPERGLYCEMIA, WITHOUT LONG-TERM CURRENT USE OF INSULIN (HCC): ICD-10-CM

## 2022-06-02 RX ORDER — FLASH GLUCOSE SENSOR
KIT MISCELLANEOUS
Qty: 2 EACH | Refills: 11 | Status: SHIPPED
Start: 2022-06-02 | End: 2022-08-09 | Stop reason: SDUPTHER

## 2022-08-01 DIAGNOSIS — I50.22 CHRONIC SYSTOLIC CONGESTIVE HEART FAILURE (HCC): ICD-10-CM

## 2022-08-01 DIAGNOSIS — M51.16 LUMBAR DISC DISEASE WITH RADICULOPATHY: ICD-10-CM

## 2022-08-01 DIAGNOSIS — F06.4 ANXIETY DISORDER DUE TO MEDICAL CONDITION: ICD-10-CM

## 2022-08-01 RX ORDER — ALBUTEROL SULFATE 90 UG/1
2 AEROSOL, METERED RESPIRATORY (INHALATION) 4 TIMES DAILY PRN
Qty: 1 EACH | Refills: 11 | Status: SHIPPED | OUTPATIENT
Start: 2022-08-01

## 2022-08-01 RX ORDER — HYDROCODONE BITARTRATE AND ACETAMINOPHEN 7.5; 325 MG/1; MG/1
1 TABLET ORAL EVERY 6 HOURS PRN
Qty: 120 TABLET | Refills: 0 | Status: SHIPPED | OUTPATIENT
Start: 2022-08-01 | End: 2022-08-31

## 2022-08-01 RX ORDER — LORAZEPAM 1 MG/1
1 TABLET ORAL 2 TIMES DAILY PRN
Qty: 180 TABLET | Refills: 0 | Status: CANCELLED | OUTPATIENT
Start: 2022-08-01 | End: 2022-10-30

## 2022-08-01 NOTE — TELEPHONE ENCOUNTER
----- Message from Tavon Gilbert sent at 8/1/2022 11:31 AM EDT -----  Subject: Refill Request    QUESTIONS  Name of Medication? HYDROcodone-acetaminophen (NORCO) 7.5-325 MG per   tablet  Patient-reported dosage and instructions? 4 tablets per day as needed  How many days do you have left? 0  Preferred Pharmacy? [facility] #[id number]  Pharmacy phone number (if available)? [phone]  Additional Information for Provider? Please fill asap. He is out of this   med.  ---------------------------------------------------------------------------  --------------,  Name of Medication? LORazepam (ATIVAN) 1 MG tablet  Patient-reported dosage and instructions? 1 tablet twice daily  How many days do you have left? 1  Preferred Pharmacy? [facility] #[id number]  Pharmacy phone number (if available)? [phone]  ---------------------------------------------------------------------------  --------------,  Name of Medication? albuterol sulfate  (90 Base) MCG/ACT inhaler  Patient-reported dosage and instructions? as needed  How many days do you have left? 0  Preferred Pharmacy? [facility] #[id number]  Pharmacy phone number (if available)? [phone]  ---------------------------------------------------------------------------  --------------  CALL BACK INFO  What is the best way for the office to contact you? OK to leave message on   voicemail  Preferred Call Back Phone Number? [phone]  ---------------------------------------------------------------------------  --------------  SCRIPT ANSWERS  Relationship to Patient?  Self

## 2022-08-01 NOTE — TELEPHONE ENCOUNTER
Spoke with pharmacy. They advised he picked up 180 tablets on 6/10 for a 90 day supply. Left msg for pt to return call to advise.

## 2022-08-01 NOTE — TELEPHONE ENCOUNTER
He filled Ativan on 6/9 for a 90 day supply. Should have enough until September.   Please confirm with pharmacy

## 2022-08-09 ENCOUNTER — TELEMEDICINE (OUTPATIENT)
Dept: PRIMARY CARE CLINIC | Age: 72
End: 2022-08-09
Payer: MEDICARE

## 2022-08-09 DIAGNOSIS — M51.16 LUMBAR DISC DISEASE WITH RADICULOPATHY: ICD-10-CM

## 2022-08-09 DIAGNOSIS — F06.4 ANXIETY DISORDER DUE TO MEDICAL CONDITION: ICD-10-CM

## 2022-08-09 DIAGNOSIS — E11.65 TYPE 2 DIABETES MELLITUS WITH HYPERGLYCEMIA, WITHOUT LONG-TERM CURRENT USE OF INSULIN (HCC): ICD-10-CM

## 2022-08-09 DIAGNOSIS — Z00.00 MEDICARE ANNUAL WELLNESS VISIT, SUBSEQUENT: Primary | ICD-10-CM

## 2022-08-09 PROCEDURE — 3051F HG A1C>EQUAL 7.0%<8.0%: CPT | Performed by: FAMILY MEDICINE

## 2022-08-09 PROCEDURE — G0439 PPPS, SUBSEQ VISIT: HCPCS | Performed by: FAMILY MEDICINE

## 2022-08-09 PROCEDURE — 1123F ACP DISCUSS/DSCN MKR DOCD: CPT | Performed by: FAMILY MEDICINE

## 2022-08-09 RX ORDER — HYDROCODONE BITARTRATE AND ACETAMINOPHEN 7.5; 325 MG/1; MG/1
1 TABLET ORAL EVERY 6 HOURS PRN
Qty: 120 TABLET | Refills: 0 | Status: SHIPPED | OUTPATIENT
Start: 2022-08-29 | End: 2022-09-28

## 2022-08-09 RX ORDER — LORAZEPAM 1 MG/1
1 TABLET ORAL 2 TIMES DAILY PRN
Qty: 180 TABLET | Refills: 0 | Status: SHIPPED | OUTPATIENT
Start: 2022-09-01 | End: 2022-11-30

## 2022-08-09 RX ORDER — HYDROCODONE BITARTRATE AND ACETAMINOPHEN 7.5; 325 MG/1; MG/1
1 TABLET ORAL EVERY 6 HOURS PRN
Qty: 120 TABLET | Refills: 0 | Status: SHIPPED | OUTPATIENT
Start: 2022-09-26 | End: 2022-10-26

## 2022-08-09 RX ORDER — FLASH GLUCOSE SENSOR
KIT MISCELLANEOUS
Qty: 2 EACH | Refills: 11 | Status: SHIPPED | OUTPATIENT
Start: 2022-08-09

## 2022-08-09 RX ORDER — HYDROCODONE BITARTRATE AND ACETAMINOPHEN 7.5; 325 MG/1; MG/1
1 TABLET ORAL EVERY 6 HOURS PRN
Qty: 120 TABLET | Refills: 0 | Status: SHIPPED | OUTPATIENT
Start: 2022-10-24 | End: 2022-11-23

## 2022-08-09 ASSESSMENT — PATIENT HEALTH QUESTIONNAIRE - PHQ9
1. LITTLE INTEREST OR PLEASURE IN DOING THINGS: 0
SUM OF ALL RESPONSES TO PHQ QUESTIONS 1-9: 0
2. FEELING DOWN, DEPRESSED OR HOPELESS: 0
SUM OF ALL RESPONSES TO PHQ QUESTIONS 1-9: 0
SUM OF ALL RESPONSES TO PHQ QUESTIONS 1-9: 0
SUM OF ALL RESPONSES TO PHQ9 QUESTIONS 1 & 2: 0
SUM OF ALL RESPONSES TO PHQ QUESTIONS 1-9: 0

## 2022-08-09 ASSESSMENT — LIFESTYLE VARIABLES
HOW OFTEN DO YOU HAVE A DRINK CONTAINING ALCOHOL: NEVER
HOW MANY STANDARD DRINKS CONTAINING ALCOHOL DO YOU HAVE ON A TYPICAL DAY: PATIENT DOES NOT DRINK

## 2022-08-09 NOTE — PROGRESS NOTES
Medicare Annual Wellness Visit    Narcisa Mehta is here for Medicare AWV and Hand Pain (R hand, a few years ago he did something to his middle finger and had stitches on knuckle. Recently he is experiencing pain in his palm by middle finger and thumb when he is using his hand, it feels like an electrical shock )    Assessment & Plan   Medicare annual wellness visit, subsequent  HRA reviewed and addressed. UTD on HM. Labs for next visit already ordered. Type 2 diabetes mellitus with hyperglycemia, without long-term current use of insulin ([facility])  -     Continuous Blood Gluc Sensor (FREESTYLE MEDHAT 2 SENSOR) [facility]; USE AS DIRECTED, Disp-2 each, R-11Normal  Due for recheck next month. Already ordered    Lumbar disc disease with radiculopathy  -     HYDROcodone-acetaminophen (NORCO) 7.5-325 MG per tablet; Take 1 tablet by mouth every 6 hours as needed for Pain for up to 30 days. Intended supply: 30 days, Disp-120 tablet, R-0Normal  -     HYDROcodone-acetaminophen (NORCO) 7.5-325 MG per tablet; Take 1 tablet by mouth every 6 hours as needed for Pain for up to 30 days. Intended supply: 30 days, Disp-120 tablet, R-0Normal  -     HYDROcodone-acetaminophen (NORCO) 7.5-325 MG per tablet; Take 1 tablet by mouth every 6 hours as needed for Pain for up to 30 days. Intended supply: 30 days, Disp-120 tablet, R-0Normal  [place]AR[place] reviewed. Just filled on 8/1/22. Will post date scripts. Anxiety disorder due to medical condition  -     LORazepam (ATIVAN) 1 MG tablet; Take 1 tablet by mouth 2 times daily as needed for Anxiety for up to 90 days. , Disp-180 tablet, R-0Normal        Recommendations for Preventive Services Due: see orders and patient instructions/AVS.  Recommended screening schedule for the next 5-10 years is provided to the patient in written form: see Patient Instructions/AVS.     Return in about 3 months (around 11/9/2022), or if symptoms worsen or fail to improve, for Chronic medical conditions. Subjective   The following acute and/or chronic problems were also addressed today:    TeleMedicine Patient Consent    This visit was performed as a virtual video visit using a synchronous, two-way, audio-video telehealth technology platform. Patient identification was verified at the start of the visit, including the patient's telephone number and physical location. I discussed with the patient the nature of our telehealth visits, that:     Due to the nature of an audio- video modality, the only components of a physical exam that could be done are the elements supported by direct observation. I would evaluate the patient and recommend diagnostics and treatments based on my assessment. If it was felt that the patient should be evaluated in clinic or an emergency room setting, then they would be directed there. Our sessions are not being recorded and that personal health information is protected. Our team would provide follow up care in person if/when the patient needs it. Patient does agree to proceed with telemedicine consultation. Patient's location: home address in [facility]. Physician location: home address in [facility]. Other people involved in call:  Annabella Sesay, [address] [place] [address] Gregory. Time spent: Greater than Not billed by time    This visit was completed virtually using Doxy. me        Patient having shooting pain in R third digit. Comes and goes. Patient's complete Health Risk Assessment and screening values have been reviewed and are found in Flowsheets. The following problems were reviewed today and where indicated follow up appointments were made and/or referrals ordered.     Positive Risk Factor Screenings with Interventions:    Fall Risk:  Do you feel unsteady or are you worried about falling? : (!) yes  2 or more falls in past year?: no  Fall with injury in past year?: no   Fall Risk Interventions:    Patient declines any further evaluation/treatment for this issue            Opioid Risk: (Low risk score <55) Opioid risk score: 21    Patient is low risk for opioid use disorder or overdose. Last PDMP Karly Rodriguez as Reviewed:  Review User Review Instant Review Result   Dania Alex 8/9/2022 11:37 AM Reviewed PDMP [1]     Last Controlled Substance Monitoring Documentation      Flowsheet Row Telemedicine from 8/9/2022 in [facility]   Periodic Controlled Substance Monitoring Possible medication side effects, risk of tolerance/dependence & alternative treatments discussed., No signs of potential drug abuse or diversion identified. , Assessed functional status. filed at 08/09/2022 1248   Chronic Pain > 50 MEDD Re-evaluated the status of the patient's underlying condition causing pain.  filed at 08/09/2022 Sarahy Booth 399 and ACP:  General  In general, how would you say your health is?: Fair  In the past 7 days, have you experienced any of the following: New or Increased Pain, New or Increased Fatigue, Loneliness, Social Isolation, Stress or Anger?: No  Do you get the social and emotional support that you need?: Yes  Do you have a Living Will?: Yes    Advance Directives       Power of [address] [place] [address] Will ACP-Advance Directive ACP-Power of     Not on File Not on File Not on File Not on File        General Health Risk Interventions:  Poor self-assessment of health status: patient declines any further evaluation/treatment for this issue    Health Habits/Nutrition:  Physical Activity: Inactive    Days of Exercise per Week: 0 days    Minutes of Exercise per Session: 0 min     Have you lost any weight without trying in the past 3 months?: No     Have you seen the dentist within the past year?: (!) No  Health Habits/Nutrition Interventions:  Inadequate physical activity:  patient is not ready to increase his/her physical activity level at this time  Dental exam overdue:  patient encouraged to make appointment with his/her dentist    Hearing/Vision:  Do you or your family notice any trouble with your hearing that hasn't been managed with hearing aids?: No  Do you have difficulty driving, watching TV, or doing any of your daily activities because of your eyesight?: No  Have you had an eye exam within the past year?: (!) No  No results found. Hearing/Vision Interventions:  Vision concerns:  patient encouraged to make appointment with his/her eye specialist    Safety:  Do you have working smoke detectors?: (!) No  Do you have any tripping hazards - loose or unsecured carpets or rugs?: No  Do you have any tripping hazards - clutter in doorways, halls, or stairs?: No  Do you have either shower bars, grab bars, non-slip mats or non-slip surfaces in your shower or bathtub?: (!) No  Do all of your stairways have a railing or banister?: Yes  Do you always fasten your seatbelt when you are in a car?: (!) No  Safety Interventions:  Home safety tips provided           Objective      Patient-Reported Vitals  No data recorded   Physical Exam  Vitals and nursing note reviewed. Constitutional:       General: He is not in acute distress. Appearance: Normal appearance. He is well-developed. He is morbidly obese. He is not ill-appearing. HENT:      Head: Normocephalic and atraumatic. Right Ear: Hearing and external ear normal.      Left Ear: Hearing and external ear normal.      Nose: Nose normal.   Eyes:      General: Lids are normal. No scleral icterus. Extraocular Movements: Extraocular movements intact. Conjunctiva/sclera: Conjunctivae normal.   Pulmonary:      Effort: Pulmonary effort is normal. No respiratory distress. Breath sounds: No wheezing. Musculoskeletal:         General: Normal range of motion. Cervical back: Normal range of motion. Skin:     Findings: No rash. Neurological:      Mental Status: He is alert and oriented to person, place, and time.    Psychiatric:         Mood and Affect: Mood and affect normal.         Speech: Speech normal.         Behavior: Behavior normal.         Thought Content: Thought content normal.            Allergies   Allergen Reactions    Cleocin [Clindamycin Hcl]     Codeine     Eucerin [Albolene]     Glucophage [Metformin Hcl]     Januvia [Sitagliptin]     Lisinopril     Zithromax [Azithromycin]     Ciprofloxacin Hcl Swelling and Rash    Keflex [Cephalexin] Swelling and Rash     Prior to Visit Medications    Medication Sig Taking? Authorizing Provider   HYDROcodone-acetaminophen (NORCO) 7.5-325 MG per tablet Take 1 tablet by mouth every 6 hours as needed for Pain for up to 30 days. Intended supply: 30 days Yes Meeta Marquez, DO   HYDROcodone-acetaminophen (NORCO) 7.5-325 MG per tablet Take 1 tablet by mouth every 6 hours as needed for Pain for up to 30 days. Intended supply: 30 days Yes Meeta Marquez, DO   HYDROcodone-acetaminophen (NORCO) 7.5-325 MG per tablet Take 1 tablet by mouth every 6 hours as needed for Pain for up to 30 days. Intended supply: 30 days Yes Meeta Marquez DO   LORazepam (ATIVAN) 1 MG tablet Take 1 tablet by mouth 2 times daily as needed for Anxiety for up to 90 days. Yes Meeta Marquez, DO   Continuous Blood Gluc Sensor (FREESTYLE MEDHAT 2 SENSOR) [facility] USE AS DIRECTED Yes Meeta Marquez DO   HYDROcodone-acetaminophen (NORCO) 7.5-325 MG per tablet Take 1 tablet by mouth every 6 hours as needed for Pain for up to 30 days.  Intended supply: 30 days Yes Meeta Marquez DO   albuterol sulfate HFA (PROVENTIL;VENTOLIN;PROAIR) 108 (90 Base) MCG/ACT inhaler Inhale 2 puffs into the lungs 4 times daily as needed (2 puffs) Yes Meeta Marquez DO   bumetanide (BUMEX) 1 MG tablet TAKE 1 TABLET BY MOUTH TWICE DAILY Yes Meeta Marquez DO   potassium chloride (KLOR-CON M) 20 MEQ TBCR extended release tablet TAKE 1 TABLET BY MOUTH TWICE DAILY Yes Historical Provider, MD   pantoprazole (PROTONIX) 40 MG tablet Take 1 tablet by mouth daily Yes Meeta Marquez DO   JARDIANCE 25 MG tablet Take 25 mg by mouth Daily Yes Kimberly Patiño    glimepiride (AMARYL) 4 MG tablet Take 1 tablet by mouth 2 times daily Yes Metea Marquez DO   atorvastatin (LIPITOR) 20 MG tablet Take 1 tablet by mouth daily Yes Meeta Marquez DO   potassium chloride (KLOR-CON M) 20 MEQ extended release tablet Take 1 tablet by mouth 3 times daily Yes Meeta Marquez DO   OZEMPIC, 1 MG/DOSE, 4 MG/3ML SOPN INJECT 1MG SUBCUTANEOUSLY EVERY WEEK Yes Historical Provider, MD   metoprolol tartrate (LOPRESSOR) 50 MG tablet TAKE 1 TABLET BY MOUTH TWICE DAILY Yes Parul Ward DO   ciclopirox (LOPROX) 0.77 % cream APPLY TWICE DAILY TO BOTH FEET/LEGS Yes Historical Provider, MD   Insulin Glargine, 2 Unit Dial, (TOUJEO MAX SOLOSTAR) 300 UNIT/ML SOPN Inject 58 Units into the skin daily Yes Historical Provider, MD   insulin lispro, 1 Unit Dial, 100 UNIT/ML SOPN Inject 5 Units into the skin 3 times daily Yes Historical Provider, MD SPENCER UF III MINI PEN NEEDLES 31G X 5 MM MISC USE TWICE DAILY Yes Parul Ward DO   Continuous Blood Gluc  (FREESTYLE MEDHAT 2 READER) GERALD Use device to check BS 3-4x/day and PRN for abnormal BS Yes Meeta Marquez DO   Handicap Placard MISC by Does not apply route Duration:  Lifetime  Exp: 10/2026 Yes Meeta Marquez DO   losartan (COZAAR) 50 MG tablet TAKE 1 TABLET BY MOUTH DAILY Yes Juwan Walls,    fluticasone (FLONASE) 50 MCG/ACT nasal spray 1 spray by Each Nostril route daily Yes Meeta Marquez DO   Ferrous Sulfate (IRON) 325 (65 Fe) MG TABS Take 325 mg by mouth every other day Yes Meeta Marquez DO   Ascorbic Acid (VITAMIN C) 250 MG tablet Take 250 mg by mouth daily Yes Historical Provider, MD   metOLazone (ZAROXOLYN) 2.5 MG tablet Take 1 tablet by mouth three times a week  Meeta Marquez DO       CareTeam (Including outside providers/suppliers regularly involved in providing care):   Patient Care Team:  Parul Ward DO as PCP - General (Family Medicine)  Par[facility] Ward [facility] Reviewed and updated this visit:  Tobacco  Allergies  Meds  Problems  Med Hx  Surg Hx  Soc Hx  Fam Hx            Nadine King, was evaluated through a synchronous (real-time) audio-video encounter. The patient (or guardian if applicable) is aware that this is a billable service, which includes applicable co-pays. This Virtual Visit was conducted with patient's (and/or legal guardian's) consent. The visit was conducted pursuant to the emergency declaration under the [address] [place] [address] and the [employer] General Act. Patient identification was verified, and a caregiver was present when appropriate. The patient was located at Home: [place] [phone]. Provider was located at Home ([facility]): [address] [facility].

## 2022-08-30 RX ORDER — METOPROLOL TARTRATE 50 MG/1
TABLET, FILM COATED ORAL
Qty: 180 TABLET | Refills: 1 | Status: SHIPPED | OUTPATIENT
Start: 2022-08-30

## 2022-09-16 DIAGNOSIS — I10 BENIGN ESSENTIAL HYPERTENSION: ICD-10-CM

## 2022-09-19 RX ORDER — LOSARTAN POTASSIUM 50 MG/1
50 TABLET ORAL DAILY
Qty: 90 TABLET | Refills: 1 | Status: SHIPPED | OUTPATIENT
Start: 2022-09-19

## 2022-10-18 ENCOUNTER — TELEPHONE (OUTPATIENT)
Dept: PRIMARY CARE CLINIC | Age: 72
End: 2022-10-18

## 2022-10-18 RX ORDER — DOXYCYCLINE HYCLATE 100 MG
100 TABLET ORAL 2 TIMES DAILY
Qty: 20 TABLET | Refills: 0 | Status: SHIPPED | OUTPATIENT
Start: 2022-10-18 | End: 2022-10-28

## 2022-10-18 NOTE — TELEPHONE ENCOUNTER
Pt wife calling in for pt. Has been c/o chest cold off and on with productive cough for a few weeks. Takes mucinex and feel better but once he stops doing that and the breathing treatments it comes right back. Asking if you will call in an antibiotic for him.

## 2022-10-30 DIAGNOSIS — E11.65 TYPE 2 DIABETES MELLITUS WITH HYPERGLYCEMIA, WITHOUT LONG-TERM CURRENT USE OF INSULIN (HCC): ICD-10-CM

## 2022-10-31 RX ORDER — SEMAGLUTIDE 1.34 MG/ML
INJECTION, SOLUTION SUBCUTANEOUS
Qty: 9 ML | Refills: 1 | Status: SHIPPED
Start: 2022-10-31 | End: 2022-11-14 | Stop reason: SDUPTHER

## 2022-11-08 RX ORDER — POTASSIUM CHLORIDE 20 MEQ/1
TABLET, EXTENDED RELEASE ORAL
Qty: 270 TABLET | Refills: 1 | Status: SHIPPED | OUTPATIENT
Start: 2022-11-08

## 2022-11-09 DIAGNOSIS — E11.65 TYPE 2 DIABETES MELLITUS WITH HYPERGLYCEMIA, WITHOUT LONG-TERM CURRENT USE OF INSULIN (HCC): ICD-10-CM

## 2022-11-09 RX ORDER — FLASH GLUCOSE SENSOR
KIT MISCELLANEOUS
Qty: 2 EACH | Refills: 11 | Status: SHIPPED | OUTPATIENT
Start: 2022-11-09

## 2022-11-14 ENCOUNTER — TELEMEDICINE (OUTPATIENT)
Dept: PRIMARY CARE CLINIC | Age: 72
End: 2022-11-14
Payer: MEDICARE

## 2022-11-14 DIAGNOSIS — K21.9 GASTROESOPHAGEAL REFLUX DISEASE WITHOUT ESOPHAGITIS: ICD-10-CM

## 2022-11-14 DIAGNOSIS — E11.65 TYPE 2 DIABETES MELLITUS WITH HYPERGLYCEMIA, WITHOUT LONG-TERM CURRENT USE OF INSULIN (HCC): Primary | ICD-10-CM

## 2022-11-14 DIAGNOSIS — F06.4 ANXIETY DISORDER DUE TO MEDICAL CONDITION: ICD-10-CM

## 2022-11-14 DIAGNOSIS — I48.91 ATRIAL FIBRILLATION WITH CONTROLLED VENTRICULAR RESPONSE (HCC): ICD-10-CM

## 2022-11-14 DIAGNOSIS — I50.32 CHRONIC CONGESTIVE HEART FAILURE WITH LEFT VENTRICULAR DIASTOLIC DYSFUNCTION (HCC): ICD-10-CM

## 2022-11-14 DIAGNOSIS — M51.16 LUMBAR DISC DISEASE WITH RADICULOPATHY: ICD-10-CM

## 2022-11-14 DIAGNOSIS — I10 BENIGN ESSENTIAL HYPERTENSION: ICD-10-CM

## 2022-11-14 DIAGNOSIS — E78.2 MIXED HYPERLIPIDEMIA: ICD-10-CM

## 2022-11-14 PROCEDURE — 99443 PR PHYS/QHP TELEPHONE EVALUATION 21-30 MIN: CPT | Performed by: FAMILY MEDICINE

## 2022-11-14 RX ORDER — LORAZEPAM 1 MG/1
1 TABLET ORAL 2 TIMES DAILY PRN
Qty: 180 TABLET | Refills: 0 | Status: SHIPPED | OUTPATIENT
Start: 2022-11-14 | End: 2023-02-12

## 2022-11-14 RX ORDER — ATORVASTATIN CALCIUM 20 MG/1
20 TABLET, FILM COATED ORAL DAILY
Qty: 90 TABLET | Refills: 1 | Status: SHIPPED | OUTPATIENT
Start: 2022-11-14

## 2022-11-14 RX ORDER — EMPAGLIFLOZIN 25 MG/1
25 TABLET, FILM COATED ORAL DAILY
Qty: 90 TABLET | Refills: 1 | Status: SHIPPED | OUTPATIENT
Start: 2022-11-14

## 2022-11-14 RX ORDER — HYDROCODONE BITARTRATE AND ACETAMINOPHEN 7.5; 325 MG/1; MG/1
1 TABLET ORAL EVERY 6 HOURS PRN
Qty: 120 TABLET | Refills: 0 | Status: SHIPPED | OUTPATIENT
Start: 2023-01-22 | End: 2023-02-21

## 2022-11-14 RX ORDER — HYDROCODONE BITARTRATE AND ACETAMINOPHEN 7.5; 325 MG/1; MG/1
1 TABLET ORAL EVERY 6 HOURS PRN
Qty: 120 TABLET | Refills: 0 | Status: SHIPPED | OUTPATIENT
Start: 2022-12-25 | End: 2023-01-24

## 2022-11-14 RX ORDER — HYDROCODONE BITARTRATE AND ACETAMINOPHEN 7.5; 325 MG/1; MG/1
1 TABLET ORAL EVERY 6 HOURS PRN
Qty: 120 TABLET | Refills: 0 | Status: SHIPPED | OUTPATIENT
Start: 2022-11-27 | End: 2022-12-27

## 2022-11-14 RX ORDER — GLIMEPIRIDE 4 MG/1
4 TABLET ORAL 2 TIMES DAILY
Qty: 180 TABLET | Refills: 1 | Status: SHIPPED | OUTPATIENT
Start: 2022-11-14

## 2022-11-14 RX ORDER — PANTOPRAZOLE SODIUM 40 MG/1
40 TABLET, DELAYED RELEASE ORAL DAILY
Qty: 90 TABLET | Refills: 1 | Status: SHIPPED | OUTPATIENT
Start: 2022-11-14

## 2022-11-14 RX ORDER — POTASSIUM CHLORIDE 1500 MG/1
TABLET, FILM COATED, EXTENDED RELEASE ORAL
Qty: 180 TABLET | Refills: 1 | Status: SHIPPED | OUTPATIENT
Start: 2022-11-14

## 2022-11-14 RX ORDER — SEMAGLUTIDE 1.34 MG/ML
INJECTION, SOLUTION SUBCUTANEOUS
Qty: 9 ML | Refills: 1 | Status: SHIPPED | OUTPATIENT
Start: 2022-11-14

## 2022-11-14 RX ORDER — BUMETANIDE 1 MG/1
TABLET ORAL
Qty: 180 TABLET | Refills: 1 | Status: SHIPPED | OUTPATIENT
Start: 2022-11-14

## 2022-11-14 ASSESSMENT — ENCOUNTER SYMPTOMS
SHORTNESS OF BREATH: 0
COUGH: 0
NAUSEA: 0
ABDOMINAL PAIN: 0
BACK PAIN: 1
CONSTIPATION: 0
WHEEZING: 0
VOMITING: 0
DIARRHEA: 0

## 2022-11-14 NOTE — PROGRESS NOTES
22  Zeenat Bernal   : 1950 Sex: male  Age: 81 y.o. Chief Complaint   Patient presents with    Chronic Pain     HPI:  81 y.o. male patient presents today for 3 month(s) follow up of chronic medical conditions, medication refills and FBW. Patient's chart, medical, surgical and medication history all reviewed. Zeenat Bernal is a 81 y.o. male evaluated via telephone on 2022 for Chronic Pain    Documentation:  I communicated with the patient and/or health care decision maker about chronic medical conditions. Details of this discussion including any medical advice provided: listed bleow    Total Time: minutes: 21-30 minutes    Zeenat Bernal was evaluated through a synchronous (real-time) audio encounter. Patient identification was verified at the start of the visit. He (or guardian if applicable) is aware that this is a billable service, which includes applicable co-pays. This visit was conducted with the patient's (and/or legal guardian's) verbal consent. He has not had a related appointment within my department in the past 7 days or scheduled within the next 24 hours. The patient was located at Home: [place] [id number]. The provider was located at Home ([place] [id number]): [place]. Note: not billable if this call serves to triage the patient into an appointment for the relevant concern    Emmanuel Goodman DO       Diabetes Mellitus  81 y.o. male presents for follow up of type 2 diabetes. Current diabetic medications include: oral agents (dual therapy): glimepiride (Amaryl), Jardiance, insulin injections: Toujeo and SSI prn for BS >200 and Ozempic . Previous medications tried include: oral agents (triple therapy): metformin (generic), pioglitazone (Actos), sitagliptin (Januvia) and insulin injections: Lantus : nightly , but failed due to various reasons.    Most recent HgA1c was 7.7% (2022)---9.1% (2021)---9.8% (2021)---6.7% (2020)--- 6.5% (February 2020)---7.4% (August 2019). His most recent TSH was 0.71. LDL is 69. Renal function is improved. The patient has evidence of end organ damage including nephropathy, peripheral neuropathy and peripheral vascular disease. He does not see a Podiatrist for foot care . Last eye exam was unknown. Patient now has a Cellay, which has really helped with BS awareness. Hypertension   The patient presents today for follow up of HTN. The problem is  unable to determine over phone visit . Risk factors for coronary artery disease include Age > 27, male, HTN, diabetes and elevated cholesterol. Current treatments include bumetanide (Bumex), losartan (Cozaar), metolazone (Zaroxolyn) and metoprolol (Lopressor, Toprol). The patient is compliant all of the time. Lifestyle changes the patient has made include  none . Today the patient is complaining of peripheral edema. He does wear compression stockings. He has a history of CHF which required hospitalization in January 2020. Recently seen by Dr. Deanna Morales who checked Echo in January. No change from 2020. Follows with [facility]- stable. Hyperlipidemia  The ASCVD Risk score (Soila COSME, et al., 2019) failed to calculate for the following reasons: The valid total cholesterol range is 130 to 320 mg/dL    Back pain  Patient has a history of ongoing lumbar back pain. Significant neurologic dysfunction due to back issues. Unable to walk due to the weakness of lower extremities- wheelchair bound. Only able to walk approximately 20 feet. ROS:  Review of Systems   Constitutional:  Negative for chills, fatigue and fever. Respiratory:  Negative for cough, shortness of breath and wheezing. Cardiovascular:  Positive for leg swelling. Negative for chest pain and palpitations. Gastrointestinal:  Negative for abdominal pain, constipation, diarrhea, nausea and vomiting.    Musculoskeletal:  Positive for arthralgias, back pain and gait problem. Skin:  Negative for rash. Neurological:  Positive for weakness and numbness. Negative for dizziness and headaches. Psychiatric/Behavioral:  Negative for dysphoric mood. The patient is not nervous/anxious. All other systems reviewed and are negative. Current Outpatient Medications on File Prior to Visit   Medication Sig Dispense Refill    Continuous Blood Gluc Sensor (FREESTYLE MEDHAT 2 SENSOR) MISC USE AS DIRECTED 2 each 11    potassium chloride (KLOR-CON M) 20 MEQ extended release tablet TAKE 1 TABLET BY MOUTH THREE TIMES DAILY 270 tablet 1    losartan (COZAAR) 50 MG tablet TAKE 1 TABLET BY MOUTH DAILY 90 tablet 1    metoprolol tartrate (LOPRESSOR) 50 MG tablet TAKE 1 TABLET BY MOUTH TWICE DAILY 180 tablet 1    HYDROcodone-acetaminophen (NORCO) 7.5-325 MG per tablet Take 1 tablet by mouth every 6 hours as needed for Pain for up to 30 days.  Intended supply: 30 days 120 tablet 0    albuterol sulfate HFA (PROVENTIL;VENTOLIN;PROAIR) 108 (90 Base) MCG/ACT inhaler Inhale 2 puffs into the lungs 4 times daily as needed (2 puffs) 1 each 11    ciclopirox (LOPROX) 0.77 % cream APPLY TWICE DAILY TO BOTH FEET/LEGS      Insulin Glargine, 2 Unit Dial, (TOUJEO MAX SOLOSTAR) 300 UNIT/ML SOPN Inject 58 Units into the skin daily      insulin lispro, 1 Unit Dial, 100 UNIT/ML SOPN Inject 5 Units into the skin 3 times daily      B-D UF III MINI PEN NEEDLES 31G X 5 MM MISC USE TWICE DAILY 100 each 5    Continuous Blood Gluc  (FREESTYLE MEDHAT 2 READER) GERALD Use device to check BS 3-4x/day and PRN for abnormal BS 1 each 5    Handicap Placard MISC by Does not apply route Duration:  Lifetime  Exp: 10/2026 1 each 0    fluticasone (FLONASE) 50 MCG/ACT nasal spray 1 spray by Each Nostril route daily 3 Bottle 3    Ferrous Sulfate (IRON) 325 (65 Fe) MG TABS Take 325 mg by mouth every other day 90 tablet 3    Ascorbic Acid (VITAMIN C) 250 MG tablet Take 250 mg by mouth daily      metOLazone (ZAROXOLYN) 2.5 MG tablet Take 1 tablet by mouth three times a week 36 tablet 1     No current facility-administered medications on file prior to visit.        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     Hyperlipidemia     Hypertension     Obesity     morbid     NICOLLE (obstructive sleep apnea)     Peripheral edema     Stasis dermatitis     Thyroid nodule     Vitamin D insufficiency      Past Surgical History:   Procedure Laterality Date    CERVICAL FUSION      COLONOSCOPY      EYE SURGERY      cataract with lol implants - OU    HERNIA REPAIR      umbilical     RECTAL SURGERY      transanal excision rectal cancer     SIGMOIDOSCOPY      procotoscopy     UPPER GASTROINTESTINAL ENDOSCOPY  06/15/2020    Radha- erosive gastritis     Family History   Problem Relation Age of Onset    Cancer Mother         breast     Diabetes Father     Diabetes Brother     Heart Attack Brother     Other Brother         valvular heart disease    Cancer Brother         lung      Social History     Socioeconomic History    Marital status:      Spouse name: Not on file    Number of children: Not on file    Years of education: Not on file    Highest education level: Not on file   Occupational History    Not on file   Tobacco Use    Smoking status: Never    Smokeless tobacco: Never   Vaping Use    Vaping Use: Never used   Substance and Sexual Activity    Alcohol use: Never    Drug use: Never    Sexual activity: Not on file   Other Topics Concern    Not on file   Social History Narrative    Not on file     Social Determinants of Health     Financial Resource Strain: Not on file   Food Insecurity: Not on file   Transportation Needs: Not on file   Physical Activity: Inactive    Days of Exercise per Week: 0 days    Minutes of Exercise per Session: 0 min   Stress: Not on file   Social Connections: Not on file   Intimate Partner Violence: Not on file   Housing Stability: Not on file     There were no vitals filed for this visit. Physical Exam:  Unable to determine via phone visit. Does not sound like he is in any acute distress.      Labs:  CBC with Differential:    Lab Results   Component Value Date/Time    WBC 9.8 03/17/2022 10:07 AM    RBC 5.18 03/17/2022 10:07 AM    HGB 14.6 03/17/2022 10:07 AM    HCT 44.6 03/17/2022 10:07 AM     03/17/2022 10:07 AM    MCV 86.0 03/17/2022 10:07 AM    MCH 28.1 03/17/2022 10:07 AM    MCHC 32.7 03/17/2022 10:07 AM    RDW 15.9 03/17/2022 10:07 AM    SEGSPCT 75.1 03/17/2022 10:07 AM    LYMPHOPCT 16.5 03/17/2022 10:07 AM    MONOPCT 6.8 03/17/2022 10:07 AM    EOSPCT 1.9 03/16/2021 12:00 AM    BASOPCT 0.5 03/17/2022 10:07 AM    MONOSABS 0.7 03/17/2022 10:07 AM    LYMPHSABS 1.6 03/17/2022 10:07 AM    EOSABS 0.1 03/17/2022 10:07 AM    BASOSABS 0.1 03/17/2022 10:07 AM     CMP:    Lab Results   Component Value Date/Time     03/17/2022 10:07 AM    K 3.2 03/17/2022 10:07 AM    CL 95 03/17/2022 10:07 AM    CO2 32 03/17/2022 10:07 AM    BUN 26 03/17/2022 10:07 AM    CREATININE 1.0 03/17/2022 10:07 AM    GFRAA 117 02/18/2020 05:55 AM    AGRATIO 1.1 03/17/2022 10:07 AM    LABGLOM 74 03/17/2022 10:07 AM    GLUCOSE 159 03/17/2022 10:07 AM    PROT 6.5 03/17/2022 10:07 AM    LABALBU 3.4 03/17/2022 10:07 AM    CALCIUM 9.1 03/17/2022 10:07 AM    BILITOT 0.7 03/17/2022 10:07 AM    ALKPHOS 82 03/17/2022 10:07 AM    AST 17 03/17/2022 10:07 AM    ALT 18 03/17/2022 10:07 AM     HgBA1c:    Lab Results   Component Value Date/Time    LABA1C 7.7 03/17/2022 10:07 AM     Microalbumen/Creatinine ratio:  No components found for: RUCREAT  FLP:    Lab Results   Component Value Date/Time    TRIG 113 03/17/2022 10:07 AM    HDL 32 03/17/2022 10:07 AM    LDLCALC 73 03/16/2021 12:00 AM    LABVLDL 25 08/26/2019 02:09 PM     TSH:    Lab Results   Component Value Date/Time    TSH 0.71 03/17/2022 10:07 AM     PSA:   Lab Results   Component Value Date/Time    PSA 3.37 03/16/2021 12:00 AM        Assessment and Plan:  Juana Chilel was seen today for chronic pain. Diagnoses and all orders for this visit:    Type 2 diabetes mellitus with hyperglycemia, without long-term current use of insulin (HCC)  -     OZEMPIC, 1 MG/DOSE, 4 MG/3ML SOPN; INJECT 1 MG INTO THE SKIN ONCE A WEEK  -     JARDIANCE 25 MG tablet; Take 1 tablet by mouth Daily  -     glimepiride (AMARYL) 4 MG tablet; Take 1 tablet by mouth 2 times daily  Patient overdue for labs. Has the orders at home. [facility] get done [facility]     Atrial fibrillation with controlled ventricular response ([facility] [address]  Follows with Dr. Juan Diego Roberts    Benign essential hypertension  Unable to determine via phone visit. Mixed hyperlipidemia  -     atorvastatin (LIPITOR) 20 MG tablet; Take 1 tablet by mouth daily    Chronic congestive heart failure with left ventricular diastolic dysfunction (HCC)  -     bumetanide (BUMEX) 1 MG tablet; TAKE 1 TABLET BY MOUTH TWICE DAILY  -     potassium chloride (KLOR-CON M) 20 MEQ TBCR extended release tablet; TAKE 1 TABLET BY MOUTH TWICE DAILY    Gastroesophageal reflux disease without esophagitis  -     pantoprazole (PROTONIX) 40 MG tablet; Take 1 tablet by mouth daily    Lumbar disc disease with radiculopathy  -     HYDROcodone-acetaminophen (NORCO) 7.5-325 MG per tablet; Take 1 tablet by mouth every 6 hours as needed for Pain for up to 30 days. Intended supply: 30 days  -     HYDROcodone-acetaminophen (NORCO) 7.5-325 MG per tablet; Take 1 tablet by mouth every 6 hours as needed for Pain for up to 30 days.  Intended supply: 30 days  -     HYDROcodone-acetaminophen (NORCO) 7.5-325 MG per tablet; Take 1 tablet by mouth every 6 hours as needed for Pain for up to 30 days. Intended supply: 30 days  OARRS reviewed and is appropriate    Anxiety disorder due to medical condition  -     LORazepam (ATIVAN) 1 MG tablet; Take 1 tablet by mouth 2 times daily as needed for Anxiety for up to 90 days. Return in about 3 months (around 2/14/2023), or if symptoms worsen or fail to improve, for Chronic medical conditions.       Seen By:  Елена Gardner, DO

## 2022-11-25 ENCOUNTER — TELEPHONE (OUTPATIENT)
Dept: PRIMARY CARE CLINIC | Age: 72
End: 2022-11-25

## 2022-11-25 RX ORDER — FLUTICASONE PROPIONATE 50 MCG
1 SPRAY, SUSPENSION (ML) NASAL DAILY
Qty: 16 G | Refills: 5 | Status: SHIPPED | OUTPATIENT
Start: 2022-11-25

## 2022-11-25 RX ORDER — METOLAZONE 2.5 MG/1
2.5 TABLET ORAL
Qty: 36 TABLET | Refills: 1 | Status: SHIPPED | OUTPATIENT
Start: 2022-11-25 | End: 2023-02-23

## 2022-11-25 NOTE — TELEPHONE ENCOUNTER
Patient calling c/o congestion for 3-4 days no improvement with mucinex  No other symptoms currently   [facility]

## 2022-11-30 ENCOUNTER — TELEPHONE (OUTPATIENT)
Dept: PRIMARY CARE CLINIC | Age: 72
End: 2022-11-30

## 2022-11-30 NOTE — TELEPHONE ENCOUNTER
Reviewed labs. A1c mildly worsened at 8.3% from 7.7%- if he is no longer seeing Endo for diabetes management, then I would recommend changing Toujeo dosing to 40U at night and 20U in the AM.   His PSA has also gone up to 4.7- we need to just watch this and repeat in 6 months.   All other labs are stable

## 2022-12-30 DIAGNOSIS — F06.4 ANXIETY DISORDER DUE TO MEDICAL CONDITION: ICD-10-CM

## 2022-12-30 RX ORDER — LORAZEPAM 1 MG/1
1 TABLET ORAL 2 TIMES DAILY PRN
Qty: 180 TABLET | Refills: 0 | OUTPATIENT
Start: 2022-12-30 | End: 2023-03-30

## 2023-02-14 ENCOUNTER — TELEMEDICINE (OUTPATIENT)
Dept: PRIMARY CARE CLINIC | Age: 73
End: 2023-02-14
Payer: MEDICARE

## 2023-02-14 DIAGNOSIS — F06.4 ANXIETY DISORDER DUE TO MEDICAL CONDITION: ICD-10-CM

## 2023-02-14 DIAGNOSIS — I13.0 CARDIORENAL SYNDROME WITH RENAL FAILURE, STAGE 1-4 OR UNSPECIFIED CHRONIC KIDNEY DISEASE, WITH HEART FAILURE (HCC): ICD-10-CM

## 2023-02-14 DIAGNOSIS — M51.16 LUMBAR DISC DISEASE WITH RADICULOPATHY: ICD-10-CM

## 2023-02-14 DIAGNOSIS — I50.32 CHRONIC CONGESTIVE HEART FAILURE WITH LEFT VENTRICULAR DIASTOLIC DYSFUNCTION (HCC): ICD-10-CM

## 2023-02-14 DIAGNOSIS — I48.91 ATRIAL FIBRILLATION WITH CONTROLLED VENTRICULAR RESPONSE (HCC): ICD-10-CM

## 2023-02-14 DIAGNOSIS — I10 BENIGN ESSENTIAL HYPERTENSION: ICD-10-CM

## 2023-02-14 DIAGNOSIS — E11.65 TYPE 2 DIABETES MELLITUS WITH HYPERGLYCEMIA, WITHOUT LONG-TERM CURRENT USE OF INSULIN (HCC): Primary | ICD-10-CM

## 2023-02-14 PROBLEM — I13.10 CARDIORENAL SYNDROME: Status: ACTIVE | Noted: 2023-02-14

## 2023-02-14 PROCEDURE — 1123F ACP DISCUSS/DSCN MKR DOCD: CPT | Performed by: FAMILY MEDICINE

## 2023-02-14 PROCEDURE — 3051F HG A1C>EQUAL 7.0%<8.0%: CPT | Performed by: FAMILY MEDICINE

## 2023-02-14 PROCEDURE — 99214 OFFICE O/P EST MOD 30 MIN: CPT | Performed by: FAMILY MEDICINE

## 2023-02-14 RX ORDER — FLASH GLUCOSE SENSOR
KIT MISCELLANEOUS
Qty: 2 EACH | Refills: 11 | Status: SHIPPED | OUTPATIENT
Start: 2023-02-14

## 2023-02-14 RX ORDER — SEMAGLUTIDE 2.68 MG/ML
INJECTION, SOLUTION SUBCUTANEOUS
COMMUNITY
Start: 2023-01-23

## 2023-02-14 RX ORDER — HYDROCODONE BITARTRATE AND ACETAMINOPHEN 7.5; 325 MG/1; MG/1
1 TABLET ORAL EVERY 6 HOURS PRN
Qty: 120 TABLET | Refills: 0 | Status: SHIPPED | OUTPATIENT
Start: 2023-02-28 | End: 2023-03-30

## 2023-02-14 RX ORDER — HYDROCODONE BITARTRATE AND ACETAMINOPHEN 7.5; 325 MG/1; MG/1
1 TABLET ORAL EVERY 6 HOURS PRN
Qty: 120 TABLET | Refills: 0 | Status: SHIPPED | OUTPATIENT
Start: 2023-04-25 | End: 2023-05-25

## 2023-02-14 RX ORDER — HYDROCODONE BITARTRATE AND ACETAMINOPHEN 7.5; 325 MG/1; MG/1
1 TABLET ORAL EVERY 6 HOURS PRN
Qty: 120 TABLET | Refills: 0 | Status: SHIPPED | OUTPATIENT
Start: 2023-03-28 | End: 2023-04-27

## 2023-02-14 RX ORDER — LORAZEPAM 1 MG/1
1 TABLET ORAL 2 TIMES DAILY PRN
Qty: 180 TABLET | Refills: 0 | Status: SHIPPED | OUTPATIENT
Start: 2023-02-14 | End: 2023-05-15

## 2023-02-14 ASSESSMENT — ENCOUNTER SYMPTOMS
ABDOMINAL PAIN: 0
SHORTNESS OF BREATH: 0
COUGH: 0
WHEEZING: 0
VOMITING: 0
CONSTIPATION: 0
BACK PAIN: 1
NAUSEA: 0
DIARRHEA: 0

## 2023-02-14 ASSESSMENT — PATIENT HEALTH QUESTIONNAIRE - PHQ9
SUM OF ALL RESPONSES TO PHQ QUESTIONS 1-9: 1
1. LITTLE INTEREST OR PLEASURE IN DOING THINGS: 0
SUM OF ALL RESPONSES TO PHQ QUESTIONS 1-9: 1
SUM OF ALL RESPONSES TO PHQ QUESTIONS 1-9: 1
2. FEELING DOWN, DEPRESSED OR HOPELESS: 1
SUM OF ALL RESPONSES TO PHQ9 QUESTIONS 1 & 2: 1
SUM OF ALL RESPONSES TO PHQ QUESTIONS 1-9: 1

## 2023-02-14 NOTE — PROGRESS NOTES
23  Ramon Wiseman   : 1950 Sex: male  Age: 81 y.o. Chief Complaint   Patient presents with    Diabetes     HPI:  81 y.o. male patient presents today for 3 month(s) follow up of chronic medical conditions, medication refills and FBW. Patient's chart, medical, surgical and medication history all reviewed. TeleMedicine Patient Consent    This visit was performed as a virtual video visit using a synchronous, two-way, audio-video telehealth technology platform. Patient identification was verified at the start of the visit, including the patient's telephone number and physical location. I discussed with the patient the nature of our telehealth visits, that:     Due to the nature of an audio- video modality, the only components of a physical exam that could be done are the elements supported by direct observation. I would evaluate the patient and recommend diagnostics and treatments based on my assessment. If it was felt that the patient should be evaluated in clinic or an emergency room setting, then they would be directed there. Our sessions are not being recorded and that personal health information is protected. Our team would provide follow up care in person if/when the patient needs it. Patient does agree to proceed with telemedicine consultation. Patient's location: home address in [facility]. Physician location: home address in [facility]. Other people involved in call:  Rachel Alfaro. Time spent: Greater than Not billed by time    This visit was completed virtually using Doxy. me      Diabetes Mellitus  81 y.o. male presents for follow up of type 2 diabetes. Current diabetic medications include: oral agents (dual therapy): glimepiride (Amaryl), Jardiance, insulin injections: Toujeo and SSI prn for BS >200 and Ozempic .    Previous medications tried include: oral agents (triple therapy): metformin (generic), pioglitazone (Actos), sitagliptin (Januvia) and insulin injections: Lantus : nightly , but failed due to various reasons. Most recent HgA1c was 7.8% (Jan 2023)---8.3% (November 2022)---7.7% (March 2022)---9.1% (October 2021)---9.8% (March 2021)---6.7% (June 2020)--- 6.5% (February 2020)---7.4% (August 2019). His most recent TSH was 0.61. LDL is 68. Renal function is improved. The patient has evidence of end organ damage including nephropathy, peripheral neuropathy and peripheral vascular disease. He does not see a Podiatrist for foot care . Last eye exam was unknown. Patient now has a [employer], which has really helped with BS awareness. Hypertension   The patient presents today for follow up of HTN. The problem is  unable to determine over phone visit . Risk factors for coronary artery disease include Age > 27, male, HTN, diabetes and elevated cholesterol. Current treatments include bumetanide (Bumex), losartan (Cozaar), metolazone (Zaroxolyn) and metoprolol (Lopressor, Toprol). The patient is compliant all of the time. Lifestyle changes the patient has made include  none . Today the patient is complaining of peripheral edema. He does wear compression stockings. He has a history of CHF which required hospitalization in January 2020. Recently seen by Dr. Sandra Gil who checked Echo in January. No change from 2020. Follows with [facility]- stable. Hyperlipidemia  The ASCVD Risk score (Soila DK, et al., 2019) failed to calculate for the following reasons: The valid total cholesterol range is 130 to 320 mg/dL    Back pain  Patient has a history of ongoing lumbar back pain. Significant neurologic dysfunction due to back issues. Unable to walk due to the weakness of lower extremities- wheelchair bound. Only able to walk approximately 20 feet. ROS:  Review of Systems   Constitutional:  Negative for chills, fatigue and fever. Respiratory:  Negative for cough, shortness of breath and wheezing.     Cardiovascular:  Positive for leg swelling. Negative for chest pain and palpitations. Gastrointestinal:  Negative for abdominal pain, constipation, diarrhea, nausea and vomiting. Musculoskeletal:  Positive for arthralgias, back pain and gait problem. Skin:  Negative for rash. Neurological:  Positive for weakness and numbness. Negative for dizziness and headaches. Psychiatric/Behavioral:  Negative for dysphoric mood. The patient is not nervous/anxious. All other systems reviewed and are negative.      Current Outpatient Medications on File Prior to Visit   Medication Sig Dispense Refill    OZEMPIC, 2 MG/DOSE, 8 MG/3ML SOPN INJECT 2MG UNDER THE SKIN ONCE A WEEK      metOLazone (ZAROXOLYN) 2.5 MG tablet Take 1 tablet by mouth three times a week 36 tablet 1    fluticasone (FLONASE) 50 MCG/ACT nasal spray 1 spray by Each Nostril route daily 16 g 5    bumetanide (BUMEX) 1 MG tablet TAKE 1 TABLET BY MOUTH TWICE DAILY 180 tablet 1    pantoprazole (PROTONIX) 40 MG tablet Take 1 tablet by mouth daily 90 tablet 1    JARDIANCE 25 MG tablet Take 1 tablet by mouth Daily 90 tablet 1    glimepiride (AMARYL) 4 MG tablet Take 1 tablet by mouth 2 times daily 180 tablet 1    atorvastatin (LIPITOR) 20 MG tablet Take 1 tablet by mouth daily 90 tablet 1    potassium chloride (KLOR-CON M) 20 MEQ TBCR extended release tablet TAKE 1 TABLET BY MOUTH TWICE DAILY 180 tablet 1    potassium chloride (KLOR-CON M) 20 MEQ extended release tablet TAKE 1 TABLET BY MOUTH THREE TIMES DAILY 270 tablet 1    losartan (COZAAR) 50 MG tablet TAKE 1 TABLET BY MOUTH DAILY 90 tablet 1    metoprolol tartrate (LOPRESSOR) 50 MG tablet TAKE 1 TABLET BY MOUTH TWICE DAILY 180 tablet 1    albuterol sulfate HFA (PROVENTIL;VENTOLIN;PROAIR) 108 (90 Base) MCG/ACT inhaler Inhale 2 puffs into the lungs 4 times daily as needed (2 puffs) 1 each 11    ciclopirox (LOPROX) 0.77 % cream APPLY TWICE DAILY TO BOTH FEET/LEGS      Insulin Glargine, 2 Unit Dial, (TOUJEO MAX SOLOSTAR) 300 UNIT/ML SOPN Inject 58 Units into the skin daily      insulin lispro, 1 Unit Dial, 100 UNIT/ML SOPN Inject 5 Units into the skin 3 times daily      B-D UF III MINI PEN NEEDLES 31G X 5 MM MISC USE TWICE DAILY 100 each 5    Continuous Blood Gluc  (FREESTYLE MEDHAT 2 READER) GERALD Use device to check BS 3-4x/day and PRN for abnormal BS 1 each 5    Handicap Placard MISC by Does not apply route Duration:  Lifetime  Exp: 10/2026 1 each 0    fluticasone (FLONASE) 50 MCG/ACT nasal spray 1 spray by Each Nostril route daily 3 Bottle 3    Ferrous Sulfate (IRON) 325 (65 Fe) MG TABS Take 325 mg by mouth every other day 90 tablet 3    Ascorbic Acid (VITAMIN C) 250 MG tablet Take 250 mg by mouth daily       No current facility-administered medications on file prior to visit.        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     Hyperlipidemia     Hypertension     Obesity     morbid     NICOLLE (obstructive sleep apnea)     Peripheral edema     Stasis dermatitis     Thyroid nodule     Vitamin D insufficiency      Past Surgical History:   Procedure Laterality Date    CERVICAL FUSION      COLONOSCOPY      EYE SURGERY      cataract with lol implants - [facility]    HERNIA REPAIR      umbilical     RECTAL SURGERY      transanal excision rectal cancer     SIGMOIDOSCOPY      procotoscopy     UPPER GASTROINTESTINAL ENDOSCOPY  06/15/2020    Radha- erosive gastritis     Family History   Problem Relation Age of Onset    Cancer Mother         breast     Diabetes Father     Diabetes Brother     Heart Attack Brother     Other Brother         valvular heart disease    Cancer Brother         lung      Social History     Socioeconomic History    Marital status:      Spouse name: Not on file    Number of children: Not on file    Years of education: Not on file    Highest education level: Not on file   Occupational History    Not on file   Tobacco Use    Smoking status: Never    Smokeless tobacco: Never   Vaping Use    Vaping Use: Never used   Substance and Sexual Activity    Alcohol use: Never    Drug use: Never    Sexual activity: Not on file   Other Topics Concern    Not on file   Social History Narrative    Not on file     Social Determinants of Health     Financial Resource Strain: Not on file   Food Insecurity: Not on file   Transportation Needs: Not on file   Physical Activity: Inactive    Days of Exercise per Week: 0 days    Minutes of Exercise per Session: 0 min   Stress: Not on file   Social Connections: Not on file   Intimate Partner Violence: Not on file   Housing Stability: Not on file     There were no vitals filed for this visit. Physical Exam:  Physical Exam  Vitals and nursing note reviewed. Constitutional:       General: He is not in acute distress. Appearance: Normal appearance. He is well-developed. He is morbidly obese. He is not ill-appearing. HENT:      Head: Normocephalic and atraumatic. Right Ear: Hearing and external ear normal.      Left Ear: Hearing and external ear normal.      Nose: Nose normal.   Eyes:      General: Lids are normal. No scleral icterus. Extraocular Movements: Extraocular movements intact. Conjunctiva/sclera: Conjunctivae normal.   Pulmonary:      Effort: Pulmonary effort is normal. No respiratory distress. Breath sounds: No wheezing. Musculoskeletal:         General: Normal range of motion. Cervical back: Normal range of motion. Skin:     Findings: No rash.    Neurological:      Mental Status: He is alert and oriented to person, place, and time. Psychiatric:         Mood and Affect: Mood and affect normal.         Speech: Speech normal.         Behavior: Behavior normal.         Thought Content:  Thought content normal.         Labs:  CBC with Differential:    Lab Results   Component Value Date/Time    WBC 10.8 11/26/2022 12:43 PM    RBC 5.61 11/26/2022 12:43 PM    HGB 15.0 11/26/2022 12:43 PM    HCT 46.5 11/26/2022 12:43 PM     11/26/2022 12:43 PM    MCV 82.9 11/26/2022 12:43 PM    MCH 26.7 11/26/2022 12:43 PM    MCHC 32.2 11/26/2022 12:43 PM    RDW 17.3 11/26/2022 12:43 PM    SEGSPCT 72.8 11/26/2022 12:43 PM    LYMPHOPCT 18.2 11/26/2022 12:43 PM    MONOPCT 7.2 11/26/2022 12:43 PM    EOSPCT 1.9 03/16/2021 12:00 AM    BASOPCT 0.8 11/26/2022 12:43 PM    MONOSABS 0.8 11/26/2022 12:43 PM    LYMPHSABS 2.0 11/26/2022 12:43 PM    EOSABS 0.1 11/26/2022 12:43 PM    BASOSABS 0.1 11/26/2022 12:43 PM     CMP:    Lab Results   Component Value Date/Time     01/18/2023 01:16 PM    K 3.3 01/18/2023 01:16 PM    CL 94 01/18/2023 01:16 PM    CO2 30 01/18/2023 01:16 PM    BUN 22 01/18/2023 01:16 PM    CREATININE 1.1 01/18/2023 01:16 PM    GFRAA 117 02/18/2020 05:55 AM    AGRATIO 0.9 01/18/2023 01:16 PM    LABGLOM 66 01/18/2023 01:16 PM    GLUCOSE 188 01/18/2023 01:16 PM    PROT 7.5 01/18/2023 01:16 PM    LABALBU 3.6 01/18/2023 01:16 PM    CALCIUM 9.2 01/18/2023 01:16 PM    BILITOT 0.6 01/18/2023 01:16 PM    ALKPHOS 82 01/18/2023 01:16 PM    AST 20 01/18/2023 01:16 PM    ALT 21 01/18/2023 01:16 PM     HgBA1c:    Lab Results   Component Value Date/Time    LABA1C 7.8 01/18/2023 01:16 PM     Microalbumen/Creatinine ratio:  No components found for: RUCREAT  FLP:    Lab Results   Component Value Date/Time    TRIG 189 01/18/2023 01:16 PM    HDL 30 01/18/2023 01:16 PM    LDLCALC 73 03/16/2021 12:00 AM    LABVLDL 25 08/26/2019 02:09 PM     TSH:    Lab Results   Component Value Date/Time    TSH 0.61 11/26/2022 12:43 PM     PSA:   Lab Results Component Value Date/Time    PSA 4.70 11/26/2022 12:43 PM        Assessment and Plan:  Juan Ramon Kirk was seen today for diabetes. Diagnoses and all orders for this visit:    Type 2 diabetes mellitus with hyperglycemia, without long-term current use of insulin (HCC)  -     Continuous Blood Gluc Sensor (FREESTYLE MEDHAT 2 SENSOR) [facility]; USE AS DIRECTED  Improved control on recent labs in January. Follows with Endo. Will continue to monitor peripherally. Benign essential hypertension  Cannot determine via VV    Atrial fibrillation with controlled ventricular response ([address]  Follows with  Cardiology    Chronic congestive heart failure with left ventricular diastolic dysfunction (HCC)  Stable    Cardiorenal syndrome with renal failure, stage 1-4 or unspecified chronic kidney disease, with heart failure (HCC)  Stable    Anxiety disorder due to medical condition  -     LORazepam (ATIVAN) 1 MG tablet; Take 1 tablet by mouth 2 times daily as needed for Anxiety for up to 90 days. Lumbar disc disease with radiculopathy  -     HYDROcodone-acetaminophen (NORCO) 7.5-325 MG per tablet; Take 1 tablet by mouth every 6 hours as needed for Pain for up to 30 days. Intended supply: 30 days  -     HYDROcodone-acetaminophen (NORCO) 7.5-325 MG per tablet; Take 1 tablet by mouth every 6 hours as needed for Pain for up to 30 days. Intended supply: 30 days  -     HYDROcodone-acetaminophen (NORCO) 7.5-325 MG per tablet; Take 1 tablet by mouth every 6 hours as needed for Pain for up to 30 days. Intended supply: 30 days  OARRS reviewed and is appropriate. Return in about 3 months (around 5/14/2023), or if symptoms worsen or fail to improve, for Chronic medical conditions.       Seen By:  Safia Blancas DO

## 2023-03-01 DIAGNOSIS — I10 BENIGN ESSENTIAL HYPERTENSION: ICD-10-CM

## 2023-03-01 DIAGNOSIS — E11.65 TYPE 2 DIABETES MELLITUS WITH HYPERGLYCEMIA, WITHOUT LONG-TERM CURRENT USE OF INSULIN (HCC): ICD-10-CM

## 2023-03-01 RX ORDER — LOSARTAN POTASSIUM 50 MG/1
50 TABLET ORAL DAILY
Qty: 90 TABLET | Refills: 1 | Status: SHIPPED | OUTPATIENT
Start: 2023-03-01

## 2023-03-01 RX ORDER — METOLAZONE 2.5 MG/1
2.5 TABLET ORAL
Qty: 36 TABLET | Refills: 1 | Status: SHIPPED | OUTPATIENT
Start: 2023-03-01 | End: 2023-05-30

## 2023-03-01 RX ORDER — METOPROLOL TARTRATE 50 MG/1
TABLET, FILM COATED ORAL
Qty: 180 TABLET | Refills: 1 | Status: SHIPPED | OUTPATIENT
Start: 2023-03-01

## 2023-03-01 RX ORDER — EMPAGLIFLOZIN 25 MG/1
25 TABLET, FILM COATED ORAL DAILY
Qty: 90 TABLET | Refills: 1 | Status: SHIPPED | OUTPATIENT
Start: 2023-03-01

## 2023-03-31 DIAGNOSIS — F06.4 ANXIETY DISORDER DUE TO MEDICAL CONDITION: ICD-10-CM

## 2023-04-02 RX ORDER — LORAZEPAM 1 MG/1
1 TABLET ORAL 2 TIMES DAILY PRN
Qty: 180 TABLET | Refills: 0 | OUTPATIENT
Start: 2023-04-02 | End: 2023-07-01

## 2023-04-03 ENCOUNTER — TELEPHONE (OUTPATIENT)
Dept: PRIMARY CARE CLINIC | Age: 73
End: 2023-04-03

## 2023-04-03 NOTE — TELEPHONE ENCOUNTER
The pt's wife is calling because when the pt was seeing your dad he was prescribing him Lorazepam 1 mg 3 times a day prn but when you started prescribing him it it was being prescribed 2 times a day prn but he didn't know it was changed and lately he has had to take it 3 times a day so he has run out of his medication early and they won't refill it, he is asking if it can be sent over 3 times a day prn

## 2023-04-03 NOTE — TELEPHONE ENCOUNTER
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 I won't increase given that he is also on a narcotic medication and that he has sleep apnea.   Narcotics and Benzos together can already lower the respiratory drive, plus having sleep apnea can cause severe breathing issues in his sleep

## 2023-04-21 ENCOUNTER — OFFICE VISIT (OUTPATIENT)
Dept: FAMILY MEDICINE CLINIC | Age: 73
End: 2023-04-21
Payer: MEDICARE

## 2023-04-21 VITALS
BODY MASS INDEX: 45.1 KG/M2 | HEART RATE: 87 BPM | HEIGHT: 70 IN | DIASTOLIC BLOOD PRESSURE: 82 MMHG | SYSTOLIC BLOOD PRESSURE: 136 MMHG | TEMPERATURE: 97.2 F | WEIGHT: 315 LBS | RESPIRATION RATE: 20 BRPM | OXYGEN SATURATION: 97 %

## 2023-04-21 DIAGNOSIS — J40 SINOBRONCHITIS: Primary | ICD-10-CM

## 2023-04-21 DIAGNOSIS — J32.9 SINOBRONCHITIS: Primary | ICD-10-CM

## 2023-04-21 PROCEDURE — 3079F DIAST BP 80-89 MM HG: CPT | Performed by: FAMILY MEDICINE

## 2023-04-21 PROCEDURE — 99213 OFFICE O/P EST LOW 20 MIN: CPT | Performed by: FAMILY MEDICINE

## 2023-04-21 PROCEDURE — 1123F ACP DISCUSS/DSCN MKR DOCD: CPT | Performed by: FAMILY MEDICINE

## 2023-04-21 PROCEDURE — 3075F SYST BP GE 130 - 139MM HG: CPT | Performed by: FAMILY MEDICINE

## 2023-04-21 RX ORDER — DOXYCYCLINE HYCLATE 100 MG
100 TABLET ORAL 2 TIMES DAILY
Qty: 20 TABLET | Refills: 0 | Status: SHIPPED | OUTPATIENT
Start: 2023-04-21 | End: 2023-05-01

## 2023-04-21 SDOH — ECONOMIC STABILITY: FOOD INSECURITY: WITHIN THE PAST 12 MONTHS, THE FOOD YOU BOUGHT JUST DIDN'T LAST AND YOU DIDN'T HAVE MONEY TO GET MORE.: NEVER TRUE

## 2023-04-21 SDOH — ECONOMIC STABILITY: FOOD INSECURITY: WITHIN THE PAST 12 MONTHS, YOU WORRIED THAT YOUR FOOD WOULD RUN OUT BEFORE YOU GOT MONEY TO BUY MORE.: NEVER TRUE

## 2023-04-21 SDOH — ECONOMIC STABILITY: INCOME INSECURITY: HOW HARD IS IT FOR YOU TO PAY FOR THE VERY BASICS LIKE FOOD, HOUSING, MEDICAL CARE, AND HEATING?: NOT HARD AT ALL

## 2023-04-21 SDOH — ECONOMIC STABILITY: HOUSING INSECURITY
IN THE LAST 12 MONTHS, WAS THERE A TIME WHEN YOU DID NOT HAVE A STEADY PLACE TO SLEEP OR SLEPT IN A SHELTER (INCLUDING NOW)?: NO

## 2023-04-21 ASSESSMENT — ENCOUNTER SYMPTOMS
COUGH: 1
SINUS PRESSURE: 1
NAUSEA: 0
DIARRHEA: 0
VOMITING: 0
BACK PAIN: 0
EYE DISCHARGE: 0
CONSTIPATION: 0
WHEEZING: 0
SHORTNESS OF BREATH: 1
SORE THROAT: 0
RHINORRHEA: 1
ABDOMINAL PAIN: 0
SINUS PAIN: 1

## 2023-04-21 NOTE — PROGRESS NOTES
23  [facility] 1950 Sex: male  Age: 81 y.o. Chief Complaint   Patient presents with    Cough     Started around a month ago    Sinusitis     HPI:  81 y.o. male presents for acute visit due to cough. Upper Respiratory Symptoms  Patient complains of congestion, nasal blockage, post nasal drip, productive cough, and bilateral sinus pain. Patient denies anorexia, chest pain, chills, dizziness, fatigue, fevers, myalgias, nausea, shortness of breath, and wheezing. He has had symptoms for 1 month. Symptoms have unchanged since that time. He has tried Mucinex, Flonase at home without improvement in symptoms. He denies a history of asthma. He has not had recent close exposure to someone with similar symptoms. Patient also noting increasing stomach bloating and gas. Some dysphagia. ROS:  Review of Systems   Constitutional:  Negative for appetite change, chills and fever. HENT:  Positive for congestion, postnasal drip, rhinorrhea, sinus pressure and sinus pain. Negative for ear pain and sore throat. Eyes:  Negative for discharge. Respiratory:  Positive for cough and shortness of breath. Negative for wheezing. Cardiovascular:  Negative for chest pain and palpitations. Gastrointestinal:  Negative for abdominal pain, constipation, diarrhea, nausea and vomiting. Musculoskeletal:  Negative for back pain. Skin:  Negative for rash. Neurological:  Negative for dizziness and headaches. Hematological:  Negative for adenopathy. All other systems reviewed and are negative.    Current Outpatient Medications on File Prior to Visit   Medication Sig Dispense Refill    metoprolol tartrate (LOPRESSOR) 50 MG tablet TAKE 1 TABLET BY MOUTH TWICE DAILY 180 tablet 1    metOLazone (ZAROXOLYN) 2.5 MG tablet Take 1 tablet by mouth three times a week 36 tablet 1    losartan (COZAAR) 50 MG tablet Take 1 tablet by mouth daily 90 tablet 1    JARDIANCE 25 MG tablet Take 1 tablet by mouth

## 2023-05-11 ENCOUNTER — TELEMEDICINE (OUTPATIENT)
Dept: PRIMARY CARE CLINIC | Age: 73
End: 2023-05-11
Payer: MEDICARE

## 2023-05-11 DIAGNOSIS — M51.16 LUMBAR DISC DISEASE WITH RADICULOPATHY: ICD-10-CM

## 2023-05-11 DIAGNOSIS — I50.32 CHRONIC CONGESTIVE HEART FAILURE WITH LEFT VENTRICULAR DIASTOLIC DYSFUNCTION (HCC): ICD-10-CM

## 2023-05-11 DIAGNOSIS — I48.91 ATRIAL FIBRILLATION WITH CONTROLLED VENTRICULAR RESPONSE (HCC): ICD-10-CM

## 2023-05-11 DIAGNOSIS — F06.4 ANXIETY DISORDER DUE TO MEDICAL CONDITION: ICD-10-CM

## 2023-05-11 DIAGNOSIS — E11.65 TYPE 2 DIABETES MELLITUS WITH HYPERGLYCEMIA, WITHOUT LONG-TERM CURRENT USE OF INSULIN (HCC): Primary | ICD-10-CM

## 2023-05-11 DIAGNOSIS — E78.2 MIXED HYPERLIPIDEMIA: ICD-10-CM

## 2023-05-11 DIAGNOSIS — I10 BENIGN ESSENTIAL HYPERTENSION: ICD-10-CM

## 2023-05-11 PROCEDURE — 3051F HG A1C>EQUAL 7.0%<8.0%: CPT | Performed by: FAMILY MEDICINE

## 2023-05-11 PROCEDURE — 1123F ACP DISCUSS/DSCN MKR DOCD: CPT | Performed by: FAMILY MEDICINE

## 2023-05-11 PROCEDURE — 99214 OFFICE O/P EST MOD 30 MIN: CPT | Performed by: FAMILY MEDICINE

## 2023-05-11 RX ORDER — ATORVASTATIN CALCIUM 20 MG/1
20 TABLET, FILM COATED ORAL DAILY
Qty: 90 TABLET | Refills: 1 | Status: SHIPPED | OUTPATIENT
Start: 2023-05-11

## 2023-05-11 RX ORDER — HYDROCODONE BITARTRATE AND ACETAMINOPHEN 7.5; 325 MG/1; MG/1
1 TABLET ORAL EVERY 6 HOURS PRN
Qty: 120 TABLET | Refills: 0 | Status: SHIPPED | OUTPATIENT
Start: 2023-05-11 | End: 2023-06-10

## 2023-05-11 RX ORDER — HYDROCODONE BITARTRATE AND ACETAMINOPHEN 7.5; 325 MG/1; MG/1
1 TABLET ORAL EVERY 6 HOURS PRN
Qty: 120 TABLET | Refills: 0 | Status: SHIPPED | OUTPATIENT
Start: 2023-07-06 | End: 2023-08-05

## 2023-05-11 RX ORDER — LORAZEPAM 1 MG/1
1 TABLET ORAL 2 TIMES DAILY PRN
Qty: 180 TABLET | Refills: 0 | Status: SHIPPED | OUTPATIENT
Start: 2023-05-11 | End: 2023-08-09

## 2023-05-11 RX ORDER — HYDROCODONE BITARTRATE AND ACETAMINOPHEN 7.5; 325 MG/1; MG/1
1 TABLET ORAL EVERY 6 HOURS PRN
Qty: 120 TABLET | Refills: 0 | Status: SHIPPED | OUTPATIENT
Start: 2023-06-08 | End: 2023-07-08

## 2023-05-11 RX ORDER — GLIMEPIRIDE 4 MG/1
4 TABLET ORAL 2 TIMES DAILY
Qty: 180 TABLET | Refills: 1 | Status: SHIPPED | OUTPATIENT
Start: 2023-05-11

## 2023-05-11 RX ORDER — POTASSIUM CHLORIDE 20 MEQ/1
20 TABLET, EXTENDED RELEASE ORAL 3 TIMES DAILY
Qty: 270 TABLET | Refills: 1 | Status: SHIPPED | OUTPATIENT
Start: 2023-05-11

## 2023-05-11 ASSESSMENT — ENCOUNTER SYMPTOMS
DIARRHEA: 0
COUGH: 0
SHORTNESS OF BREATH: 0
ABDOMINAL PAIN: 0
WHEEZING: 0
BACK PAIN: 1
CONSTIPATION: 0
VOMITING: 0
NAUSEA: 0

## 2023-05-11 NOTE — PROGRESS NOTES
respiratory distress. Breath sounds: No wheezing. Musculoskeletal:         General: Normal range of motion. Cervical back: Normal range of motion. Skin:     Findings: No rash. Neurological:      Mental Status: He is alert and oriented to person, place, and time. Psychiatric:         Mood and Affect: Mood and affect normal.         Speech: Speech normal.         Behavior: Behavior normal.         Thought Content:  Thought content normal.         Labs:  CBC with Differential:    Lab Results   Component Value Date/Time    WBC 10.8 11/26/2022 12:43 PM    RBC 5.61 11/26/2022 12:43 PM    HGB 15.0 11/26/2022 12:43 PM    HCT 46.5 11/26/2022 12:43 PM     11/26/2022 12:43 PM    MCV 82.9 11/26/2022 12:43 PM    MCH 26.7 11/26/2022 12:43 PM    MCHC 32.2 11/26/2022 12:43 PM    RDW 17.3 11/26/2022 12:43 PM    SEGSPCT 72.8 11/26/2022 12:43 PM    LYMPHOPCT 18.2 11/26/2022 12:43 PM    MONOPCT 7.2 11/26/2022 12:43 PM    EOSPCT 1.9 03/16/2021 12:00 AM    BASOPCT 0.8 11/26/2022 12:43 PM    MONOSABS 0.8 11/26/2022 12:43 PM    LYMPHSABS 2.0 11/26/2022 12:43 PM    EOSABS 0.1 11/26/2022 12:43 PM    BASOSABS 0.1 11/26/2022 12:43 PM     CMP:    Lab Results   Component Value Date/Time     01/18/2023 01:16 PM    K 3.3 01/18/2023 01:16 PM    CL 94 01/18/2023 01:16 PM    CO2 30 01/18/2023 01:16 PM    BUN 22 01/18/2023 01:16 PM    CREATININE 1.1 01/18/2023 01:16 PM    GFRAA 117 02/18/2020 05:55 AM    AGRATIO 0.9 01/18/2023 01:16 PM    LABGLOM 66 01/18/2023 01:16 PM    GLUCOSE 188 01/18/2023 01:16 PM    PROT 7.5 01/18/2023 01:16 PM    LABALBU 3.6 01/18/2023 01:16 PM    CALCIUM 9.2 01/18/2023 01:16 PM    BILITOT 0.6 01/18/2023 01:16 PM    ALKPHOS 82 01/18/2023 01:16 PM    AST 20 01/18/2023 01:16 PM    ALT 21 01/18/2023 01:16 PM     HgBA1c:    Lab Results   Component Value Date/Time    LABA1C 7.8 01/18/2023 01:16 PM     Microalbumen/Creatinine ratio:  No components found for: RUCREAT  FLP:    Lab Results   Component

## 2023-07-03 ENCOUNTER — TELEPHONE (OUTPATIENT)
Dept: PRIMARY CARE CLINIC | Age: 73
End: 2023-07-03

## 2023-07-03 NOTE — TELEPHONE ENCOUNTER
Patient got script on 6/1. Pharmacy will not fill script for this  month until 7/6 which is written on script and patient has been without medication for a few days.    HYDROcodone-acetaminophen (NORCO) 7.5-325 MG per tablet

## 2023-07-25 ENCOUNTER — TELEPHONE (OUTPATIENT)
Dept: PRIMARY CARE CLINIC | Age: 73
End: 2023-07-25

## 2023-07-25 NOTE — TELEPHONE ENCOUNTER
This is hard to treat without seeing it.   He may just need Zinc cream and Aquaphor without antibiotics

## 2023-07-25 NOTE — TELEPHONE ENCOUNTER
Then I would try topical Zinc cream with Aquaphor over it. I can't know what an appropriate antibiotic would be without seeing it.   He is more than welcome to go to an UC down in [place] as well if he is able to be seen closer to home

## 2023-07-25 NOTE — TELEPHONE ENCOUNTER
The pt is calling because he is rubbing himself raw between his legs in his groin area, he is asking if an antibiotic can be sent over to the pharmacy for him, I did tell him he would need to come in through the [facility] but he says he can't walk so he won't be able to come in

## 2023-07-28 NOTE — TELEPHONE ENCOUNTER
Patient advised - he believes it is cellulitis and he just thinks he needs the abx that he normally gets with that - is that a cream that can be sent in ?

## 2023-07-28 NOTE — TELEPHONE ENCOUNTER
I appreciate his diagnosis, but again, I am not sending any antibiotic in without evaluation.   Antibiotic cream would not appropriately treat cellulitis

## 2023-08-04 ENCOUNTER — OFFICE VISIT (OUTPATIENT)
Dept: FAMILY MEDICINE CLINIC | Age: 73
End: 2023-08-04
Payer: MEDICARE

## 2023-08-04 VITALS
HEIGHT: 70 IN | OXYGEN SATURATION: 97 % | BODY MASS INDEX: 45.1 KG/M2 | TEMPERATURE: 97.5 F | HEART RATE: 74 BPM | SYSTOLIC BLOOD PRESSURE: 136 MMHG | DIASTOLIC BLOOD PRESSURE: 80 MMHG | RESPIRATION RATE: 20 BRPM | WEIGHT: 315 LBS

## 2023-08-04 DIAGNOSIS — L03.311 CELLULITIS OF ABDOMINAL WALL: ICD-10-CM

## 2023-08-04 DIAGNOSIS — E11.65 TYPE 2 DIABETES MELLITUS WITH HYPERGLYCEMIA, WITHOUT LONG-TERM CURRENT USE OF INSULIN (HCC): Primary | ICD-10-CM

## 2023-08-04 DIAGNOSIS — M51.16 LUMBAR DISC DISEASE WITH RADICULOPATHY: ICD-10-CM

## 2023-08-04 DIAGNOSIS — F06.4 ANXIETY DISORDER DUE TO MEDICAL CONDITION: ICD-10-CM

## 2023-08-04 DIAGNOSIS — I50.22 CHRONIC SYSTOLIC CONGESTIVE HEART FAILURE (HCC): ICD-10-CM

## 2023-08-04 DIAGNOSIS — J31.0 CHRONIC RHINITIS: ICD-10-CM

## 2023-08-04 DIAGNOSIS — I10 BENIGN ESSENTIAL HYPERTENSION: ICD-10-CM

## 2023-08-04 DIAGNOSIS — D50.8 OTHER IRON DEFICIENCY ANEMIA: ICD-10-CM

## 2023-08-04 DIAGNOSIS — E78.2 MIXED HYPERLIPIDEMIA: ICD-10-CM

## 2023-08-04 DIAGNOSIS — K21.9 GASTROESOPHAGEAL REFLUX DISEASE WITHOUT ESOPHAGITIS: ICD-10-CM

## 2023-08-04 DIAGNOSIS — I50.32 CHRONIC CONGESTIVE HEART FAILURE WITH LEFT VENTRICULAR DIASTOLIC DYSFUNCTION (HCC): ICD-10-CM

## 2023-08-04 PROBLEM — L03.90 CELLULITIS: Status: ACTIVE | Noted: 2023-08-04

## 2023-08-04 PROCEDURE — 99214 OFFICE O/P EST MOD 30 MIN: CPT | Performed by: FAMILY MEDICINE

## 2023-08-04 PROCEDURE — 1123F ACP DISCUSS/DSCN MKR DOCD: CPT | Performed by: FAMILY MEDICINE

## 2023-08-04 PROCEDURE — 3074F SYST BP LT 130 MM HG: CPT | Performed by: FAMILY MEDICINE

## 2023-08-04 PROCEDURE — 3078F DIAST BP <80 MM HG: CPT | Performed by: FAMILY MEDICINE

## 2023-08-04 PROCEDURE — 3051F HG A1C>EQUAL 7.0%<8.0%: CPT | Performed by: FAMILY MEDICINE

## 2023-08-04 RX ORDER — GLIMEPIRIDE 4 MG/1
4 TABLET ORAL 2 TIMES DAILY
Qty: 180 TABLET | Refills: 1 | Status: SHIPPED | OUTPATIENT
Start: 2023-08-04

## 2023-08-04 RX ORDER — PNV NO.95/FERROUS FUM/FOLIC AC 28MG-0.8MG
325 TABLET ORAL EVERY OTHER DAY
Qty: 90 TABLET | Refills: 3 | Status: SHIPPED | OUTPATIENT
Start: 2023-08-04

## 2023-08-04 RX ORDER — LOSARTAN POTASSIUM 50 MG/1
50 TABLET ORAL DAILY
Qty: 90 TABLET | Refills: 1 | Status: SHIPPED | OUTPATIENT
Start: 2023-08-04

## 2023-08-04 RX ORDER — METOLAZONE 2.5 MG/1
2.5 TABLET ORAL
Qty: 36 TABLET | Refills: 1 | Status: SHIPPED | OUTPATIENT
Start: 2023-08-04 | End: 2023-11-02

## 2023-08-04 RX ORDER — BUMETANIDE 1 MG/1
TABLET ORAL
Qty: 180 TABLET | Refills: 1 | Status: SHIPPED | OUTPATIENT
Start: 2023-08-04

## 2023-08-04 RX ORDER — ATORVASTATIN CALCIUM 20 MG/1
20 TABLET, FILM COATED ORAL DAILY
Qty: 90 TABLET | Refills: 1 | Status: SHIPPED | OUTPATIENT
Start: 2023-08-04

## 2023-08-04 RX ORDER — PANTOPRAZOLE SODIUM 40 MG/1
40 TABLET, DELAYED RELEASE ORAL DAILY
Qty: 90 TABLET | Refills: 1 | Status: SHIPPED | OUTPATIENT
Start: 2023-08-04

## 2023-08-04 RX ORDER — BUSPIRONE HYDROCHLORIDE 10 MG/1
10 TABLET ORAL 3 TIMES DAILY
Qty: 90 TABLET | Refills: 2 | Status: SHIPPED | OUTPATIENT
Start: 2023-08-04 | End: 2023-09-03

## 2023-08-04 RX ORDER — ALBUTEROL SULFATE 90 UG/1
2 AEROSOL, METERED RESPIRATORY (INHALATION) 4 TIMES DAILY PRN
Qty: 1 EACH | Refills: 11 | Status: SHIPPED | OUTPATIENT
Start: 2023-08-04

## 2023-08-04 RX ORDER — HYDROCODONE BITARTRATE AND ACETAMINOPHEN 7.5; 325 MG/1; MG/1
1 TABLET ORAL EVERY 6 HOURS PRN
Qty: 120 TABLET | Refills: 0 | Status: SHIPPED | OUTPATIENT
Start: 2023-09-01 | End: 2023-10-01

## 2023-08-04 RX ORDER — POTASSIUM CHLORIDE 20 MEQ/1
20 TABLET, EXTENDED RELEASE ORAL 3 TIMES DAILY
Qty: 270 TABLET | Refills: 1 | Status: SHIPPED | OUTPATIENT
Start: 2023-08-04

## 2023-08-04 RX ORDER — HYDROCODONE BITARTRATE AND ACETAMINOPHEN 7.5; 325 MG/1; MG/1
1 TABLET ORAL EVERY 6 HOURS PRN
Qty: 120 TABLET | Refills: 0 | Status: SHIPPED | OUTPATIENT
Start: 2023-09-29 | End: 2023-10-29

## 2023-08-04 RX ORDER — HYDROCODONE BITARTRATE AND ACETAMINOPHEN 7.5; 325 MG/1; MG/1
1 TABLET ORAL EVERY 6 HOURS PRN
Qty: 120 TABLET | Refills: 0 | Status: SHIPPED | OUTPATIENT
Start: 2023-08-04 | End: 2023-09-03

## 2023-08-04 RX ORDER — AMOXICILLIN AND CLAVULANATE POTASSIUM 875; 125 MG/1; MG/1
1 TABLET, FILM COATED ORAL 2 TIMES DAILY
Qty: 14 TABLET | Refills: 0 | Status: SHIPPED | OUTPATIENT
Start: 2023-08-04 | End: 2023-08-11

## 2023-08-04 RX ORDER — FLUTICASONE PROPIONATE 50 MCG
1 SPRAY, SUSPENSION (ML) NASAL DAILY
Qty: 3 EACH | Refills: 3 | Status: SHIPPED | OUTPATIENT
Start: 2023-08-04

## 2023-08-04 RX ORDER — METOPROLOL TARTRATE 50 MG/1
TABLET, FILM COATED ORAL
Qty: 180 TABLET | Refills: 1 | Status: SHIPPED | OUTPATIENT
Start: 2023-08-04

## 2023-08-04 RX ORDER — LORAZEPAM 1 MG/1
1 TABLET ORAL 2 TIMES DAILY PRN
Qty: 180 TABLET | Refills: 0 | Status: SHIPPED | OUTPATIENT
Start: 2023-08-04 | End: 2023-11-02

## 2023-08-12 ASSESSMENT — ENCOUNTER SYMPTOMS
ABDOMINAL PAIN: 0
BACK PAIN: 1
COUGH: 0
VOMITING: 0
SHORTNESS OF BREATH: 0
CONSTIPATION: 0
DIARRHEA: 0
WHEEZING: 0
NAUSEA: 0

## 2023-08-12 NOTE — PROGRESS NOTES
23  Viv Gaffney   : 1950 Sex: male  Age: 81 y.o. Chief Complaint   Patient presents with    Diarrhea     HPI:  81 y.o. male patient presents today for 3 month(s) follow up of chronic medical conditions, medication refills and FBW. Patient's chart, medical, surgical and medication history all reviewed. Diabetes Mellitus  81 y.o. male presents for follow up of type 2 diabetes. Current diabetic medications include: oral agents (dual therapy): glimepiride (Amaryl), Jardiance, insulin injections: Toujeo and SSI prn for BS >200 and Ozempic . Previous medications tried include: oral agents (triple therapy): metformin (generic), pioglitazone (Actos), sitagliptin (Januvia) and insulin injections: Lantus : nightly , but failed due to various reasons. Most recent HgA1c was 7.8% (2023)---8.3% (2022)---7.7% (2022)---9.1% (2021)---9.8% (2021)---6.7% (2020)--- 6.5% (2020)---7.4% (2019). His most recent TSH was 0.61. LDL is 68. Renal function is improved. The patient has evidence of end organ damage including nephropathy, peripheral neuropathy and peripheral vascular disease. He does not see a Podiatrist for foot care . Last eye exam was unknown. Patient now has a ITN, which has really helped with BS awareness. Hypertension   The patient presents today for follow up of HTN. The problem is well controlled. Risk factors for coronary artery disease include Age > 27, male, HTN, diabetes and elevated cholesterol. Current treatments include bumetanide (Bumex), losartan (Cozaar), metolazone (Zaroxolyn) and metoprolol (Lopressor, Toprol). The patient is compliant all of the time. Lifestyle changes the patient has made include  none . Today the patient is complaining of peripheral edema. He does wear compression stockings. He has a history of CHF which required hospitalization in 2020.   Recently seen

## 2023-08-17 ENCOUNTER — TELEPHONE (OUTPATIENT)
Dept: PRIMARY CARE CLINIC | Age: 73
End: 2023-08-17

## 2023-08-17 RX ORDER — NYSTATIN 100000 U/G
CREAM TOPICAL
Qty: 30 G | Refills: 2 | Status: SHIPPED | OUTPATIENT
Start: 2023-08-17

## 2023-08-25 ENCOUNTER — TELEPHONE (OUTPATIENT)
Dept: FAMILY MEDICINE CLINIC | Age: 73
End: 2023-08-25
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
## 2023-08-25 NOTE — TELEPHONE ENCOUNTER
Spoke with Marion Quijano; appt reschedule to 9/1 at 4:15 PM.    Date, time & location confirmed with pt.

## 2023-09-01 ENCOUNTER — OFFICE VISIT (OUTPATIENT)
Dept: FAMILY MEDICINE CLINIC | Age: 73
End: 2023-09-01
Payer: MEDICARE

## 2023-09-01 VITALS
OXYGEN SATURATION: 98 % | BODY MASS INDEX: 45.1 KG/M2 | SYSTOLIC BLOOD PRESSURE: 134 MMHG | RESPIRATION RATE: 18 BRPM | HEART RATE: 80 BPM | DIASTOLIC BLOOD PRESSURE: 80 MMHG | HEIGHT: 70 IN | WEIGHT: 315 LBS | TEMPERATURE: 98 F

## 2023-09-01 DIAGNOSIS — L03.311 CELLULITIS OF ABDOMINAL WALL: Primary | ICD-10-CM

## 2023-09-01 DIAGNOSIS — N35.916 STRICTURE OF OVERLAPPING SITES OF URETHRA IN MALE, UNSPECIFIED STRICTURE TYPE: ICD-10-CM

## 2023-09-01 PROCEDURE — 99213 OFFICE O/P EST LOW 20 MIN: CPT | Performed by: FAMILY MEDICINE

## 2023-09-01 PROCEDURE — 3078F DIAST BP <80 MM HG: CPT | Performed by: FAMILY MEDICINE

## 2023-09-01 PROCEDURE — 1123F ACP DISCUSS/DSCN MKR DOCD: CPT | Performed by: FAMILY MEDICINE

## 2023-09-01 PROCEDURE — 3074F SYST BP LT 130 MM HG: CPT | Performed by: FAMILY MEDICINE

## 2023-09-13 ASSESSMENT — ENCOUNTER SYMPTOMS
NAUSEA: 0
COUGH: 0
WHEEZING: 0
DIARRHEA: 0
BACK PAIN: 1
CONSTIPATION: 0
ABDOMINAL PAIN: 0
SHORTNESS OF BREATH: 0
VOMITING: 0

## 2023-10-27 ENCOUNTER — OFFICE VISIT (OUTPATIENT)
Dept: FAMILY MEDICINE CLINIC | Age: 73
End: 2023-10-27
Payer: MEDICARE

## 2023-10-27 VITALS
BODY MASS INDEX: 45.1 KG/M2 | SYSTOLIC BLOOD PRESSURE: 134 MMHG | HEART RATE: 85 BPM | HEIGHT: 70 IN | WEIGHT: 315 LBS | OXYGEN SATURATION: 97 % | DIASTOLIC BLOOD PRESSURE: 80 MMHG | TEMPERATURE: 97.8 F | RESPIRATION RATE: 18 BRPM

## 2023-10-27 DIAGNOSIS — E78.2 MIXED HYPERLIPIDEMIA: ICD-10-CM

## 2023-10-27 DIAGNOSIS — F06.4 ANXIETY DISORDER DUE TO MEDICAL CONDITION: ICD-10-CM

## 2023-10-27 DIAGNOSIS — Z23 NEED FOR INFLUENZA VACCINATION: ICD-10-CM

## 2023-10-27 DIAGNOSIS — I10 BENIGN ESSENTIAL HYPERTENSION: ICD-10-CM

## 2023-10-27 DIAGNOSIS — M51.16 LUMBAR DISC DISEASE WITH RADICULOPATHY: ICD-10-CM

## 2023-10-27 DIAGNOSIS — E11.65 TYPE 2 DIABETES MELLITUS WITH HYPERGLYCEMIA, WITHOUT LONG-TERM CURRENT USE OF INSULIN (HCC): Primary | ICD-10-CM

## 2023-10-27 PROCEDURE — 3017F COLORECTAL CA SCREEN DOC REV: CPT | Performed by: FAMILY MEDICINE

## 2023-10-27 PROCEDURE — 3051F HG A1C>EQUAL 7.0%<8.0%: CPT | Performed by: FAMILY MEDICINE

## 2023-10-27 PROCEDURE — 1036F TOBACCO NON-USER: CPT | Performed by: FAMILY MEDICINE

## 2023-10-27 PROCEDURE — G0008 ADMIN INFLUENZA VIRUS VAC: HCPCS | Performed by: FAMILY MEDICINE

## 2023-10-27 PROCEDURE — 3078F DIAST BP <80 MM HG: CPT | Performed by: FAMILY MEDICINE

## 2023-10-27 PROCEDURE — G8484 FLU IMMUNIZE NO ADMIN: HCPCS | Performed by: FAMILY MEDICINE

## 2023-10-27 PROCEDURE — 99214 OFFICE O/P EST MOD 30 MIN: CPT | Performed by: FAMILY MEDICINE

## 2023-10-27 PROCEDURE — 3074F SYST BP LT 130 MM HG: CPT | Performed by: FAMILY MEDICINE

## 2023-10-27 PROCEDURE — 2022F DILAT RTA XM EVC RTNOPTHY: CPT | Performed by: FAMILY MEDICINE

## 2023-10-27 PROCEDURE — 1123F ACP DISCUSS/DSCN MKR DOCD: CPT | Performed by: FAMILY MEDICINE

## 2023-10-27 PROCEDURE — G8417 CALC BMI ABV UP PARAM F/U: HCPCS | Performed by: FAMILY MEDICINE

## 2023-10-27 PROCEDURE — G8427 DOCREV CUR MEDS BY ELIG CLIN: HCPCS | Performed by: FAMILY MEDICINE

## 2023-10-27 PROCEDURE — 90694 VACC AIIV4 NO PRSRV 0.5ML IM: CPT | Performed by: FAMILY MEDICINE

## 2023-10-27 RX ORDER — TAMSULOSIN HYDROCHLORIDE 0.4 MG/1
CAPSULE ORAL
COMMUNITY
Start: 2023-10-20

## 2023-10-27 ASSESSMENT — ENCOUNTER SYMPTOMS
VOMITING: 0
COUGH: 0
ABDOMINAL PAIN: 0
NAUSEA: 0
CONSTIPATION: 0
DIARRHEA: 0
BACK PAIN: 1
WHEEZING: 0
SHORTNESS OF BREATH: 0

## 2023-10-27 NOTE — PROGRESS NOTES
10/27/23  Patience Castañeda   : 1950 Sex: male  Age: 72 y.o. Chief Complaint   Patient presents with    Diabetes     HPI:  72 y.o. male patient presents today for 3 month(s) follow up of chronic medical conditions, medication refills and FBW. Patient's chart, medical, surgical and medication history all reviewed. Diabetes Mellitus  72 y.o. male presents for follow up of type 2 diabetes. Current diabetic medications include: oral agents (dual therapy): glimepiride (Amaryl), Jardiance, insulin injections: Toujeo and SSI prn for BS >200 and Ozempic . Previous medications tried include: oral agents (triple therapy): metformin (generic), pioglitazone (Actos), sitagliptin (Januvia) and insulin injections: Lantus : nightly , but failed due to various reasons. Most recent HgA1c was 7.9% (2023)---7.8% (2023)---8.3% (2022)---7.7% (2022)---9.1% (2021)---9.8% (2021)---6.7% (2020)--- 6.5% (2020)---7.4% (2019). His most recent TSH was 0.61. LDL is 62. Renal function is stable. The patient has evidence of end organ damage including nephropathy, peripheral neuropathy and peripheral vascular disease. He does not see a Podiatrist for foot care . Last eye exam was unknown. Hypertension   The patient presents today for follow up of HTN. The problem is well controlled. Risk factors for coronary artery disease include Age > 27, male, HTN, diabetes and elevated cholesterol. Current treatments include bumetanide (Bumex), losartan (Cozaar), metolazone (Zaroxolyn) and metoprolol (Lopressor, Toprol). The patient is compliant all of the time. Lifestyle changes the patient has made include  none . Today the patient is complaining of peripheral edema. He does wear compression stockings. He has a history of CHF which required hospitalization in 2020. Sseen by Dr. Khris Aguilar who checked Echo in 2022. No change from .

## 2023-10-29 PROBLEM — Z79.01 LONG TERM CURRENT USE OF ANTICOAGULANT THERAPY: Status: RESOLVED | Noted: 2020-05-28 | Resolved: 2023-10-29

## 2023-10-29 PROBLEM — L03.90 CELLULITIS: Status: RESOLVED | Noted: 2023-08-04 | Resolved: 2023-10-29

## 2023-10-29 RX ORDER — LORAZEPAM 1 MG/1
1 TABLET ORAL 2 TIMES DAILY PRN
Qty: 180 TABLET | Refills: 0 | Status: SHIPPED | OUTPATIENT
Start: 2023-10-29 | End: 2024-01-27

## 2023-10-29 RX ORDER — HYDROCODONE BITARTRATE AND ACETAMINOPHEN 7.5; 325 MG/1; MG/1
1 TABLET ORAL EVERY 6 HOURS PRN
Qty: 120 TABLET | Refills: 0 | Status: SHIPPED | OUTPATIENT
Start: 2023-12-24 | End: 2024-01-23

## 2023-10-29 RX ORDER — HYDROCODONE BITARTRATE AND ACETAMINOPHEN 7.5; 325 MG/1; MG/1
1 TABLET ORAL EVERY 6 HOURS PRN
Qty: 120 TABLET | Refills: 0 | Status: SHIPPED | OUTPATIENT
Start: 2023-10-29 | End: 2023-11-28

## 2023-10-29 RX ORDER — HYDROCODONE BITARTRATE AND ACETAMINOPHEN 7.5; 325 MG/1; MG/1
1 TABLET ORAL EVERY 6 HOURS PRN
Qty: 120 TABLET | Refills: 0 | Status: SHIPPED | OUTPATIENT
Start: 2023-11-26 | End: 2023-12-26

## 2023-11-03 ENCOUNTER — TELEPHONE (OUTPATIENT)
Dept: PRIMARY CARE CLINIC | Age: 73
End: 2023-11-03

## 2023-11-21 ENCOUNTER — TELEPHONE (OUTPATIENT)
Dept: PRIMARY CARE CLINIC | Age: 73
End: 2023-11-21

## 2023-11-29 ENCOUNTER — TELEPHONE (OUTPATIENT)
Dept: FAMILY MEDICINE CLINIC | Age: 73
End: 2023-11-29

## 2023-11-29 NOTE — TELEPHONE ENCOUNTER
Received call from pt's wife, he is stil c/o cough and congestion. She is asking if you can send in another round of medication for him.

## 2023-11-29 NOTE — TELEPHONE ENCOUNTER
What is he taking OTC?   As always, I can't just send antibiotics/medication without some kind of evaluation

## 2023-11-29 NOTE — TELEPHONE ENCOUNTER
Advised wife pt needs eval. He is taking Mucinex OTC.  She will discuss with pt and either do walk in or call for appt

## 2023-12-05 ENCOUNTER — TELEPHONE (OUTPATIENT)
Dept: PRIMARY CARE CLINIC | Age: 73
End: 2023-12-05

## 2023-12-05 DIAGNOSIS — U07.1 COVID-19: Primary | ICD-10-CM

## 2023-12-05 NOTE — TELEPHONE ENCOUNTER
Pt tested positive for COVID and needs medication right away, he says. He uses [facility] in [place].

## 2023-12-05 NOTE — TELEPHONE ENCOUNTER
Patient returned call and he is okay with GE.  Added to chart and pended medication to that pharmacy

## 2023-12-05 NOTE — TELEPHONE ENCOUNTER
Patient calling with symptoms of head congestion, cough with mucous. Wife tested + for Covid this morning, Patient has been in the same room with her and concerned he is positive. Asking if you can call something in. Has not tested himself, I did advise patient to test at home so we have a definite answer.

## 2023-12-05 NOTE — TELEPHONE ENCOUNTER
Given his medical conditions, I would recommend Paxlovid. According to our computer system, [facility] doesn't stock this. Is he okay with using [facility] in [place]?

## 2024-01-08 ENCOUNTER — TELEPHONE (OUTPATIENT)
Dept: PRIMARY CARE CLINIC | Age: 74
End: 2024-01-08

## 2024-01-08 NOTE — TELEPHONE ENCOUNTER
Needing prior auth for freestyle adriana 2. Asking if this could be authed for longer than one month. Per insurance it can be requested for up to one year.

## 2024-01-09 ENCOUNTER — TELEPHONE (OUTPATIENT)
Dept: PRIMARY CARE CLINIC | Age: 74
End: 2024-01-09

## 2024-01-09 RX ORDER — HYDROCORTISONE ACETATE 25 MG/1
25 SUPPOSITORY RECTAL EVERY 12 HOURS
Qty: 60 SUPPOSITORY | Refills: 2 | Status: SHIPPED | OUTPATIENT
Start: 2024-01-09

## 2024-02-02 ENCOUNTER — TELEMEDICINE (OUTPATIENT)
Dept: FAMILY MEDICINE CLINIC | Age: 74
End: 2024-02-02
Payer: MEDICARE

## 2024-02-02 DIAGNOSIS — I10 BENIGN ESSENTIAL HYPERTENSION: ICD-10-CM

## 2024-02-02 DIAGNOSIS — R09.02 HYPOXEMIA: ICD-10-CM

## 2024-02-02 DIAGNOSIS — E78.2 MIXED HYPERLIPIDEMIA: ICD-10-CM

## 2024-02-02 DIAGNOSIS — I48.91 ATRIAL FIBRILLATION WITH CONTROLLED VENTRICULAR RESPONSE (HCC): ICD-10-CM

## 2024-02-02 DIAGNOSIS — K21.9 GASTROESOPHAGEAL REFLUX DISEASE WITHOUT ESOPHAGITIS: ICD-10-CM

## 2024-02-02 DIAGNOSIS — M51.16 LUMBAR DISC DISEASE WITH RADICULOPATHY: ICD-10-CM

## 2024-02-02 DIAGNOSIS — E11.65 TYPE 2 DIABETES MELLITUS WITH HYPERGLYCEMIA, WITHOUT LONG-TERM CURRENT USE OF INSULIN (HCC): ICD-10-CM

## 2024-02-02 DIAGNOSIS — F06.4 ANXIETY DISORDER DUE TO MEDICAL CONDITION: ICD-10-CM

## 2024-02-02 DIAGNOSIS — Z00.00 MEDICARE ANNUAL WELLNESS VISIT, SUBSEQUENT: Primary | ICD-10-CM

## 2024-02-02 DIAGNOSIS — I50.32 CHRONIC CONGESTIVE HEART FAILURE WITH LEFT VENTRICULAR DIASTOLIC DYSFUNCTION (HCC): ICD-10-CM

## 2024-02-02 DIAGNOSIS — M25.571 ACUTE RIGHT ANKLE PAIN: ICD-10-CM

## 2024-02-02 PROCEDURE — 1123F ACP DISCUSS/DSCN MKR DOCD: CPT | Performed by: FAMILY MEDICINE

## 2024-02-02 PROCEDURE — G0439 PPPS, SUBSEQ VISIT: HCPCS | Performed by: FAMILY MEDICINE

## 2024-02-02 PROCEDURE — 99213 OFFICE O/P EST LOW 20 MIN: CPT | Performed by: FAMILY MEDICINE

## 2024-02-02 RX ORDER — METOLAZONE 2.5 MG/1
2.5 TABLET ORAL
Qty: 36 TABLET | Refills: 1 | Status: SHIPPED | OUTPATIENT
Start: 2024-02-02 | End: 2024-05-02

## 2024-02-02 RX ORDER — LOSARTAN POTASSIUM 50 MG/1
50 TABLET ORAL DAILY
Qty: 90 TABLET | Refills: 1 | Status: SHIPPED | OUTPATIENT
Start: 2024-02-02

## 2024-02-02 RX ORDER — POTASSIUM CHLORIDE 20 MEQ/1
20 TABLET, EXTENDED RELEASE ORAL 3 TIMES DAILY
Qty: 270 TABLET | Refills: 1 | Status: SHIPPED | OUTPATIENT
Start: 2024-02-02

## 2024-02-02 RX ORDER — GLIMEPIRIDE 4 MG/1
4 TABLET ORAL 2 TIMES DAILY
Qty: 180 TABLET | Refills: 1 | Status: SHIPPED | OUTPATIENT
Start: 2024-02-02

## 2024-02-02 RX ORDER — BUMETANIDE 1 MG/1
TABLET ORAL
Qty: 180 TABLET | Refills: 1 | Status: SHIPPED | OUTPATIENT
Start: 2024-02-02

## 2024-02-02 RX ORDER — PANTOPRAZOLE SODIUM 40 MG/1
40 TABLET, DELAYED RELEASE ORAL DAILY
Qty: 90 TABLET | Refills: 1 | Status: SHIPPED | OUTPATIENT
Start: 2024-02-02

## 2024-02-02 RX ORDER — METOPROLOL TARTRATE 50 MG/1
TABLET, FILM COATED ORAL
Qty: 180 TABLET | Refills: 1 | Status: SHIPPED | OUTPATIENT
Start: 2024-02-02

## 2024-02-02 RX ORDER — ATORVASTATIN CALCIUM 20 MG/1
20 TABLET, FILM COATED ORAL DAILY
Qty: 90 TABLET | Refills: 1 | Status: SHIPPED | OUTPATIENT
Start: 2024-02-02

## 2024-02-02 RX ORDER — HYDROCODONE BITARTRATE AND ACETAMINOPHEN 7.5; 325 MG/1; MG/1
1 TABLET ORAL EVERY 6 HOURS PRN
Qty: 120 TABLET | Refills: 0 | Status: SHIPPED | OUTPATIENT
Start: 2024-03-29 | End: 2024-04-28

## 2024-02-02 RX ORDER — HYDROCODONE BITARTRATE AND ACETAMINOPHEN 7.5; 325 MG/1; MG/1
1 TABLET ORAL EVERY 6 HOURS PRN
Qty: 120 TABLET | Refills: 0 | Status: SHIPPED | OUTPATIENT
Start: 2024-03-01 | End: 2024-03-31

## 2024-02-02 RX ORDER — HYDROCODONE BITARTRATE AND ACETAMINOPHEN 7.5; 325 MG/1; MG/1
1 TABLET ORAL EVERY 6 HOURS PRN
Qty: 120 TABLET | Refills: 0 | Status: SHIPPED | OUTPATIENT
Start: 2024-02-02 | End: 2024-03-03

## 2024-02-02 RX ORDER — LORAZEPAM 1 MG/1
1 TABLET ORAL 2 TIMES DAILY PRN
Qty: 180 TABLET | Refills: 0 | Status: SHIPPED | OUTPATIENT
Start: 2024-02-02 | End: 2024-05-02

## 2024-02-02 NOTE — PROGRESS NOTES
Medicare Annual Wellness Visit    Brigido Larry is here for Medicare AWV    Assessment & Plan   Medicare annual wellness visit, subsequent  HRA reviewed and addressed.  UTD on HM.  Had labs through Endo in October.     Acute right ankle pain  Explained to patient that I am concerned for DVT vs gout.  Based on evaluation via VV, I cannot make that determination.  I explained that I could treat with Colchicine, but I couldn't be sure that it wasn't a DVT without proper evaluation.  Given that it is a Friday and we performed this visit via VV, in addition to the fact that he states that he cannot walk, I did recommend that he be seen in the ER for imaging.      Type 2 diabetes mellitus with hyperglycemia, without long-term current use of insulin (HCC)  -     glimepiride (AMARYL) 4 MG tablet; Take 1 tablet by mouth 2 times daily, Disp-180 tablet, R-1Normal  -     empagliflozin (JARDIANCE) 25 MG tablet; Take 1 tablet by mouth Daily, Disp-90 tablet, R-1Normal  Follows with Endo    Atrial fibrillation with controlled ventricular response (HCC)  Unable to evaluate via VV.  Patient states that he is no longer taking a blood thinner.  Follows with Dr. Ayala.     Chronic congestive heart failure with left ventricular diastolic dysfunction (HCC)  -     bumetanide (BUMEX) 1 MG tablet; TAKE 1 TABLET BY MOUTH TWICE DAILY, Disp-180 tablet, R-1Normal  -     potassium chloride (KLOR-CON M) 20 MEQ extended release tablet; Take 1 tablet by mouth 3 times daily, Disp-270 tablet, R-1Normal  -     metOLazone (ZAROXOLYN) 2.5 MG tablet; Take 1 tablet by mouth three times a week, Disp-36 tablet, R-1Normal    Hypoxemia  Patient has a long standing history of hypoxemia.  Require O2 due to history of CHF.  Due for repeat O2 testing via insurance.  He is not in the office today for walk testing, but will start with noctural oxygen testing.  Patient has been found in the past to need O2 at night during sleep.     Benign essential

## 2024-02-05 ENCOUNTER — TELEPHONE (OUTPATIENT)
Dept: PRIMARY CARE CLINIC | Age: 74
End: 2024-02-05

## 2024-02-05 DIAGNOSIS — E87.6 HYPOKALEMIA: Primary | ICD-10-CM

## 2024-02-05 DIAGNOSIS — E11.65 TYPE 2 DIABETES MELLITUS WITH HYPERGLYCEMIA, WITHOUT LONG-TERM CURRENT USE OF INSULIN (HCC): ICD-10-CM

## 2024-02-05 RX ORDER — SEMAGLUTIDE 2.68 MG/ML
INJECTION, SOLUTION SUBCUTANEOUS
Qty: 6 ML | Refills: 1 | Status: SHIPPED | OUTPATIENT
Start: 2024-02-05

## 2024-02-05 NOTE — TELEPHONE ENCOUNTER
The pt was in the ED Saturday 02/03 at [facility] for pain in his feet/ankles, his wife is calling for him to see if you got the paper work from that and to see what you want to do about the pt's potassium being low (labs in chart)

## 2024-02-05 NOTE — TELEPHONE ENCOUNTER
I did review all of that- is he still taking his Potassium tablets TID?  We may need to have Nephrology help us if he is.  His potassium is low like this due to the multiple water pills that he takes per Cardiology

## 2024-02-08 ENCOUNTER — TELEPHONE (OUTPATIENT)
Dept: PRIMARY CARE CLINIC | Age: 74
End: 2024-02-08

## 2024-02-08 NOTE — TELEPHONE ENCOUNTER
Pt says you were going to send an order to [facility] that checks his oxygen at night, he says [place] told him they sent you repeated messages to get this testing done with no reply and the pt only has 3 weeks left and insurance wont pay for it any longer.

## 2024-02-09 ENCOUNTER — TELEPHONE (OUTPATIENT)
Dept: PRIMARY CARE CLINIC | Age: 74
End: 2024-02-09

## 2024-02-09 NOTE — TELEPHONE ENCOUNTER
Spoke to ARMINDA this am and advised that I have been faxing this order multiple times since 02/05 both lines they give are coming back busy

## 2024-02-09 NOTE — TELEPHONE ENCOUNTER
[facility] fax number to send it to [phone], they have not received orders on nocturnal pulse ox testing on RA.

## 2024-02-28 ENCOUNTER — TELEPHONE (OUTPATIENT)
Dept: PRIMARY CARE CLINIC | Age: 74
End: 2024-02-28

## 2024-02-28 RX ORDER — OSELTAMIVIR PHOSPHATE 75 MG/1
75 CAPSULE ORAL DAILY
Qty: 5 CAPSULE | Refills: 0 | Status: SHIPPED | OUTPATIENT
Start: 2024-02-28 | End: 2024-03-04

## 2024-02-28 NOTE — TELEPHONE ENCOUNTER
The pt's wife is calling for him because their granddaughter was diagnosed with influenza B, the pt doesn't have any symptoms right now but she is asking if the pt can get a script for tamiflu as a precautionary

## 2024-03-19 ENCOUNTER — TELEPHONE (OUTPATIENT)
Dept: PRIMARY CARE CLINIC | Age: 74
End: 2024-03-19

## 2024-03-19 RX ORDER — ALLOPURINOL 100 MG/1
100 TABLET ORAL DAILY
Qty: 90 TABLET | Refills: 1 | Status: SHIPPED | OUTPATIENT
Start: 2024-03-19

## 2024-03-19 RX ORDER — COLCHICINE 0.6 MG/1
0.6 TABLET ORAL DAILY
Qty: 14 TABLET | Refills: 1 | Status: SHIPPED | OUTPATIENT
Start: 2024-03-19

## 2024-03-19 NOTE — TELEPHONE ENCOUNTER
Wife calling. Patient is having a gout flare up. Asking if you can prescribe something for him. Pharmacy did a couple of Colchicine to help patient get through the pain until he contacted his PCP. Patient also asking if you can add on refill to medication incase this happens again.     [facility]

## 2024-03-19 NOTE — TELEPHONE ENCOUNTER
Colchicine sent, but if he continues to have gout flares, then we should put him on Allopurinol to prevent flares.  Cannot start it while he is in a flare though

## 2024-03-19 NOTE — TELEPHONE ENCOUNTER
Pt advised/is asking if you are going to prescribe the allopurinol so he can take it after the flare up

## 2024-03-19 NOTE — TELEPHONE ENCOUNTER
Yes, but he cannot start taking until the flare is completely resolved otherwise it will cause a repeat flare

## 2024-04-01 ENCOUNTER — TELEPHONE (OUTPATIENT)
Dept: PRIMARY CARE CLINIC | Age: 74
End: 2024-04-01

## 2024-04-01 DIAGNOSIS — G47.33 OBSTRUCTIVE SLEEP APNEA SYNDROME: ICD-10-CM

## 2024-04-01 DIAGNOSIS — I50.32 CHRONIC CONGESTIVE HEART FAILURE WITH LEFT VENTRICULAR DIASTOLIC DYSFUNCTION (HCC): ICD-10-CM

## 2024-04-01 DIAGNOSIS — I87.309 CHRONIC PERIPHERAL VENOUS HYPERTENSION: Primary | ICD-10-CM

## 2024-04-01 NOTE — TELEPHONE ENCOUNTER
Has she spoken to [facility] about this?  They will not fill the order until they have documentation with an office visit from me typically

## 2024-04-01 NOTE — TELEPHONE ENCOUNTER
New order needed for mattress. Order has to state \"bariatric mattress\".     [facility]

## 2024-04-02 NOTE — TELEPHONE ENCOUNTER
Spoke to patient wife she said she did speak with them and they advised her to call us to send order over or call them with the order

## 2024-05-02 ENCOUNTER — OFFICE VISIT (OUTPATIENT)
Dept: PRIMARY CARE CLINIC | Age: 74
End: 2024-05-02
Payer: MEDICARE

## 2024-05-02 VITALS
HEART RATE: 86 BPM | SYSTOLIC BLOOD PRESSURE: 114 MMHG | WEIGHT: 315 LBS | HEIGHT: 70 IN | DIASTOLIC BLOOD PRESSURE: 72 MMHG | TEMPERATURE: 98.2 F | OXYGEN SATURATION: 94 % | BODY MASS INDEX: 45.1 KG/M2 | RESPIRATION RATE: 18 BRPM

## 2024-05-02 DIAGNOSIS — E78.2 MIXED HYPERLIPIDEMIA: ICD-10-CM

## 2024-05-02 DIAGNOSIS — I10 BENIGN ESSENTIAL HYPERTENSION: ICD-10-CM

## 2024-05-02 DIAGNOSIS — J31.0 CHRONIC RHINITIS: ICD-10-CM

## 2024-05-02 DIAGNOSIS — E55.9 VITAMIN D DEFICIENCY: ICD-10-CM

## 2024-05-02 DIAGNOSIS — L89.159 PRESSURE INJURY OF SKIN OF SACRAL REGION, UNSPECIFIED INJURY STAGE: ICD-10-CM

## 2024-05-02 DIAGNOSIS — I50.32 CHRONIC CONGESTIVE HEART FAILURE WITH LEFT VENTRICULAR DIASTOLIC DYSFUNCTION (HCC): ICD-10-CM

## 2024-05-02 DIAGNOSIS — I50.22 CHRONIC SYSTOLIC CONGESTIVE HEART FAILURE (HCC): ICD-10-CM

## 2024-05-02 DIAGNOSIS — F06.4 ANXIETY DISORDER DUE TO MEDICAL CONDITION: ICD-10-CM

## 2024-05-02 DIAGNOSIS — M51.16 LUMBAR DISC DISEASE WITH RADICULOPATHY: ICD-10-CM

## 2024-05-02 DIAGNOSIS — E11.65 TYPE 2 DIABETES MELLITUS WITH HYPERGLYCEMIA, WITHOUT LONG-TERM CURRENT USE OF INSULIN (HCC): Primary | ICD-10-CM

## 2024-05-02 DIAGNOSIS — K21.9 GASTROESOPHAGEAL REFLUX DISEASE WITHOUT ESOPHAGITIS: ICD-10-CM

## 2024-05-02 DIAGNOSIS — I13.0 CARDIORENAL SYNDROME WITH RENAL FAILURE, STAGE 1-4 OR UNSPECIFIED CHRONIC KIDNEY DISEASE, WITH HEART FAILURE (HCC): ICD-10-CM

## 2024-05-02 PROCEDURE — G2211 COMPLEX E/M VISIT ADD ON: HCPCS | Performed by: FAMILY MEDICINE

## 2024-05-02 PROCEDURE — 3074F SYST BP LT 130 MM HG: CPT | Performed by: FAMILY MEDICINE

## 2024-05-02 PROCEDURE — 99214 OFFICE O/P EST MOD 30 MIN: CPT | Performed by: FAMILY MEDICINE

## 2024-05-02 PROCEDURE — 3078F DIAST BP <80 MM HG: CPT | Performed by: FAMILY MEDICINE

## 2024-05-02 PROCEDURE — 1123F ACP DISCUSS/DSCN MKR DOCD: CPT | Performed by: FAMILY MEDICINE

## 2024-05-02 RX ORDER — METOPROLOL TARTRATE 50 MG/1
TABLET, FILM COATED ORAL
Qty: 180 TABLET | Refills: 1 | Status: SHIPPED | OUTPATIENT
Start: 2024-05-02

## 2024-05-02 RX ORDER — HYDROCODONE BITARTRATE AND ACETAMINOPHEN 7.5; 325 MG/1; MG/1
1 TABLET ORAL EVERY 6 HOURS PRN
Qty: 120 TABLET | Refills: 0 | Status: SHIPPED | OUTPATIENT
Start: 2024-05-30 | End: 2024-06-29

## 2024-05-02 RX ORDER — PANTOPRAZOLE SODIUM 40 MG/1
40 TABLET, DELAYED RELEASE ORAL DAILY
Qty: 90 TABLET | Refills: 1 | Status: SHIPPED | OUTPATIENT
Start: 2024-05-02

## 2024-05-02 RX ORDER — BUMETANIDE 1 MG/1
TABLET ORAL
Qty: 180 TABLET | Refills: 1 | Status: SHIPPED | OUTPATIENT
Start: 2024-05-02

## 2024-05-02 RX ORDER — POTASSIUM CHLORIDE 20 MEQ/1
20 TABLET, EXTENDED RELEASE ORAL 3 TIMES DAILY
Qty: 270 TABLET | Refills: 1 | Status: SHIPPED | OUTPATIENT
Start: 2024-05-02

## 2024-05-02 RX ORDER — FLUTICASONE PROPIONATE 50 MCG
1 SPRAY, SUSPENSION (ML) NASAL DAILY
Qty: 3 EACH | Refills: 3 | Status: SHIPPED | OUTPATIENT
Start: 2024-05-02

## 2024-05-02 RX ORDER — GLIMEPIRIDE 4 MG/1
4 TABLET ORAL 2 TIMES DAILY
Qty: 180 TABLET | Refills: 1 | Status: SHIPPED | OUTPATIENT
Start: 2024-05-02

## 2024-05-02 RX ORDER — METOLAZONE 2.5 MG/1
2.5 TABLET ORAL
Qty: 36 TABLET | Refills: 1 | Status: SHIPPED | OUTPATIENT
Start: 2024-05-03 | End: 2024-08-01

## 2024-05-02 RX ORDER — LORAZEPAM 1 MG/1
1 TABLET ORAL 2 TIMES DAILY PRN
Qty: 180 TABLET | Refills: 0 | Status: SHIPPED | OUTPATIENT
Start: 2024-05-02 | End: 2024-07-31

## 2024-05-02 RX ORDER — LOSARTAN POTASSIUM 50 MG/1
50 TABLET ORAL DAILY
Qty: 90 TABLET | Refills: 1 | Status: SHIPPED | OUTPATIENT
Start: 2024-05-02

## 2024-05-02 RX ORDER — HYDROCODONE BITARTRATE AND ACETAMINOPHEN 7.5; 325 MG/1; MG/1
1 TABLET ORAL EVERY 6 HOURS PRN
Qty: 120 TABLET | Refills: 0 | Status: SHIPPED | OUTPATIENT
Start: 2024-05-02 | End: 2024-06-01

## 2024-05-02 RX ORDER — ALBUTEROL SULFATE 90 UG/1
2 AEROSOL, METERED RESPIRATORY (INHALATION) 4 TIMES DAILY PRN
Qty: 1 EACH | Refills: 11 | Status: SHIPPED | OUTPATIENT
Start: 2024-05-02

## 2024-05-02 RX ORDER — ATORVASTATIN CALCIUM 20 MG/1
20 TABLET, FILM COATED ORAL DAILY
Qty: 90 TABLET | Refills: 1 | Status: SHIPPED | OUTPATIENT
Start: 2024-05-02

## 2024-05-02 RX ORDER — HYDROCODONE BITARTRATE AND ACETAMINOPHEN 7.5; 325 MG/1; MG/1
1 TABLET ORAL EVERY 6 HOURS PRN
Qty: 120 TABLET | Refills: 0 | Status: SHIPPED | OUTPATIENT
Start: 2024-06-27 | End: 2024-07-27

## 2024-05-02 SDOH — ECONOMIC STABILITY: INCOME INSECURITY: HOW HARD IS IT FOR YOU TO PAY FOR THE VERY BASICS LIKE FOOD, HOUSING, MEDICAL CARE, AND HEATING?: NOT HARD AT ALL

## 2024-05-02 SDOH — ECONOMIC STABILITY: FOOD INSECURITY: WITHIN THE PAST 12 MONTHS, YOU WORRIED THAT YOUR FOOD WOULD RUN OUT BEFORE YOU GOT MONEY TO BUY MORE.: NEVER TRUE

## 2024-05-02 SDOH — ECONOMIC STABILITY: FOOD INSECURITY: WITHIN THE PAST 12 MONTHS, THE FOOD YOU BOUGHT JUST DIDN'T LAST AND YOU DIDN'T HAVE MONEY TO GET MORE.: NEVER TRUE

## 2024-05-02 ASSESSMENT — PATIENT HEALTH QUESTIONNAIRE - PHQ9
1. LITTLE INTEREST OR PLEASURE IN DOING THINGS: NOT AT ALL
SUM OF ALL RESPONSES TO PHQ QUESTIONS 1-9: 0
SUM OF ALL RESPONSES TO PHQ QUESTIONS 1-9: 0
SUM OF ALL RESPONSES TO PHQ9 QUESTIONS 1 & 2: 0
2. FEELING DOWN, DEPRESSED OR HOPELESS: NOT AT ALL
SUM OF ALL RESPONSES TO PHQ QUESTIONS 1-9: 0
SUM OF ALL RESPONSES TO PHQ QUESTIONS 1-9: 0

## 2024-05-02 ASSESSMENT — ENCOUNTER SYMPTOMS
WHEEZING: 0
SHORTNESS OF BREATH: 0
BACK PAIN: 1
VOMITING: 0
CONSTIPATION: 0
NAUSEA: 0
DIARRHEA: 0
COUGH: 0
ABDOMINAL PAIN: 0

## 2024-05-02 NOTE — PROGRESS NOTES
24  Brigido Larry   : 1950 Sex: male  Age: 73 y.o.    Chief Complaint   Patient presents with    Diabetes     HPI:  73 y.o. male patient presents today for 3 month(s) follow up of chronic medical conditions, medication refills and FBW.  Patient's chart, medical, surgical and medication history all reviewed.      Diabetes Mellitus  73 y.o. male presents for follow up of type 2 diabetes.  Current diabetic medications include: oral agents (dual therapy): glimepiride (Amaryl), Jardiance, insulin injections: Toujeo and SSI prn for BS >200 and Ozempic .   Previous medications tried include: oral agents (triple therapy): metformin (generic), pioglitazone (Actos), sitagliptin (Januvia) and insulin injections: Lantus : nightly , but failed due to various reasons.   Most recent HgA1c was 7.9% (2023)---7.8% (2023)---8.3% (2022)---7.7% (2022)---9.1% (2021)---9.8% (2021)---6.7% (2020)--- 6.5% (2020)---7.4% (2019).  His most recent TSH was 0.61.  LDL is 62.  Renal function is stable.  The patient has evidence of end organ damage including nephropathy, peripheral neuropathy and peripheral vascular disease.  He does not see a Podiatrist for foot care .  Last eye exam was unknown.        Hypertension   The patient presents today for follow up of HTN.  The problem is well controlled.  Risk factors for coronary artery disease include Age > 30, male, HTN, diabetes and elevated cholesterol. Current treatments include losartan (Cozaar) and metoprolol (Lopressor, Toprol). The patient is compliant all of the time.  Lifestyle changes the patient has made include  none .  Today the patient is complaining of peripheral edema.  He does wear compression stockings.         He has a history of CHF which required hospitalization in 2020.  Seen by Dr. Ayala who checked Echo in 2022.  No change from .   Follows with [facility]- stable.

## 2024-05-13 LAB
25-HYDROXY VITAMIN D-2: 53 NG/ML (ref 30–100)
A/G RATIO: 1 RATIO (ref 1.1–2.2)
ALBUMIN: 3.4 G/DL (ref 3.5–5)
ALP BLD-CCNC: 75 U/L (ref 42–121)
ALT SERPL-CCNC: 13 U/L (ref 7–52)
ANION GAP SERPL CALCULATED.3IONS-SCNC: 10 MEQ/L (ref 4–14)
APPEARANCE, BODY FLUID: ABNORMAL
AST SERPL-CCNC: 12 U/L (ref 10–41)
BASOPHILS ABSOLUTE: 0.1 K/UL (ref 0–0.2)
BASOPHILS RELATIVE PERCENT: 0.6 % (ref 0–1.5)
BILIRUB SERPL-MCNC: 0.6 MG/DL (ref 0.3–1.5)
BILIRUBIN, URINE: NEGATIVE
BUN BLDV-MCNC: 29 MG/DL (ref 7–25)
CALCIUM IONIZED: 1.12 MMOL/L (ref 1.12–1.33)
CALCIUM SERPL-MCNC: 9.1 MG/DL (ref 8.5–10.5)
CHLORIDE BLD-SCNC: 99 MEQ/L (ref 98–107)
CHOLESTEROL, TOTAL: 114 MG/DL (ref 0–199)
CO2: 30 MEQ/L (ref 21–31)
COLOR: YELLOW
CREAT SERPL-MCNC: 0.87 MG/DL (ref 0.7–1.3)
CREATININE + EGFR PANEL: 105 ML/MIN
EOSINOPHILS ABSOLUTE: 0.1 K/UL (ref 0–0.33)
EOSINOPHILS RELATIVE PERCENT: 1 % (ref 0–3)
ERYTHROCYTES URINE: NORMAL /HPF
FOLATE: 9.7 NG/ML
GFR NON-AFRICAN AMERICAN: 87 ML/MIN
GLOBULIN: 3.3 G/DL (ref 1.9–3.9)
GLUCOSE BLD-MCNC: 143 MG/DL (ref 70–99)
GLUCOSE URINE: >=500 MG/DL
HCT VFR BLD CALC: 46.7 % (ref 41–50)
HDLC SERPL-MCNC: 29 MG/DL
HEMOGLOBIN: 15.1 G/DL (ref 13.5–16.5)
KETONES, URINE: NEGATIVE MG/DL
LDL CHOLESTEROL: 61 MG/DL (ref 0–99)
LDL/HDL RATIO: 2.1 RATIO
LYMPHOCYTES ABSOLUTE: 1.6 K/UL (ref 1.1–4.8)
LYMPHOCYTES RELATIVE PERCENT: 16.5 % (ref 24–44)
MAGNESIUM: 1.5 MEQ/L (ref 1.6–2.6)
MCH RBC QN AUTO: 26.9 PG (ref 28–34)
MCHC RBC AUTO-ENTMCNC: 32.4 G/DL (ref 33–37)
MCV RBC AUTO: 83 FL (ref 80–100)
MICROALBUMIN UR-MCNC: 0.9 MG/DL (ref 0–1.8)
MICROSCOPIC URINE: YES
MONOCYTES ABSOLUTE: 0.7 K/UL (ref 0.2–0.7)
MONOCYTES RELATIVE PERCENT: 7.3 % (ref 3.4–9)
NEUTROPHILS ABSOLUTE: 7.1 K/UL (ref 1.83–8.7)
NEUTROPHILS RELATIVE PERCENT: 74.6 % (ref 40–74)
NITRATE, UA: NEGATIVE
PARATHYROID HORMONE INTACT: 31 PG/ML (ref 12–88)
PDW BLD-RTO: 16.5 % (ref 10.9–14.3)
PH, URINE: 5 (ref 4.6–8)
PHOSPHORUS: 3.1 MG/DL (ref 2.5–4.6)
PLATELET # BLD: 278 K/UL (ref 150–450)
PMV BLD AUTO: 8.6 FL (ref 7.4–10.4)
POTASSIUM SERPL-SCNC: 3.3 MEQ/L (ref 3.6–5)
PROTEIN UA: NEGATIVE MG/DL
RBC # BLD: 5.63 M/UL (ref 4.5–5.5)
RBC URINE: NEGATIVE
SODIUM BLD-SCNC: 139 MEQ/L (ref 135–145)
SPECIFIC GRAVITY UA: 1.02 (ref 1–1.03)
TOTAL PROTEIN: 6.7 G/DL (ref 6.2–8)
TRANSITIONAL EPITHELIAL: NORMAL /HPF
TRIGL SERPL-MCNC: 163 MG/DL (ref 0–149)
TSH SERPL DL<=0.05 MIU/L-ACNC: 0.66 UIU/ML (ref 0.34–5.6)
URATE: 5.9 MG/DL (ref 4.4–7.6)
URINE CULTURE REFLEX: ABNORMAL
UROBILINOGEN, URINE: NEGATIVE MG/DL
VITAMIN B-12: 175 PG/ML (ref 180–914)
WBC CLUMPS, URINE: NORMAL /HPF
WBC COUNT, UR AUTO: NORMAL /HPF
WBC URINE: ABNORMAL

## 2024-05-14 LAB
ESTIMATED AVERAGE GLUCOSE: 180 MG/DL
HBA1C MFR BLD: 7.9 % (ref 4–6)

## 2024-05-15 LAB — URINE CULTURE, ROUTINE: NORMAL

## 2024-05-27 NOTE — PROGRESS NOTES
20  Jhon Johnson   : 1950 Sex: male  Age: 81 y.o. Chief Complaint   Patient presents with    Cellulitis     bilateral legs red and hot - would like refill on bactrim      HPI:  81 y.o. male patient of Dr. Edie Carter presents today for 1 month(s) follow up of chronic medical conditions. Patient's chart, medical, surgical and medication history all reviewed. TeleMedicine Patient Consent    This visit was performed as a virtual video visit using a synchronous, two-way, audio-video telehealth technology platform. Patient identification was verified at the start of the visit, including the patient's telephone number and physical location. I discussed with the patient the nature of our telehealth visits, that:     1. Due to the nature of an audio- video modality, the only components of a physical exam that could be done are the elements supported by direct observation. 2. I would evaluate the patient and recommend diagnostics and treatments based on my assessment. 3. If it was felt that the patient should be evaluated in clinic or an emergency room setting, then they would be directed there. 4. Our sessions are not being recorded and that personal health information is protected. 5. Our team would provide follow up care in person if/when the patient needs it. Patient does agree to proceed with telemedicine consultation. Patient's location: home address in [facility]. Physician location: other address in [facility]. Other people involved in call:  [facility] [place], [place]. Time spent:  Not billed by time    This visit was completed virtually using Doxy. me    Diabetes Mellitus  81 y.o. male presents for follow up of type 2 diabetes. Current diabetic medications include: oral agent (monotherapy): glimepiride (Amaryl), insulin injections: Humalog : SSI and Bydureon.   Previous medications tried include: oral agents (triple therapy): metformin (generic), pioglitazone (Actos), sitagliptin (Januvia) and ROS:  Review of Systems   Constitutional: Negative for chills, fatigue and fever. Respiratory: Positive for shortness of breath. Negative for cough and wheezing. Cardiovascular: Positive for leg swelling. Negative for chest pain and palpitations. Gastrointestinal: Negative for abdominal pain, constipation, diarrhea, nausea and vomiting. Musculoskeletal: Positive for arthralgias, back pain and gait problem. Skin: Positive for color change. Negative for rash. Neurological: Positive for weakness and numbness. Negative for dizziness and headaches. Psychiatric/Behavioral: Negative for dysphoric mood. The patient is not nervous/anxious. All other systems reviewed and are negative. Current Outpatient Medications on File Prior to Visit   Medication Sig Dispense Refill    empagliflozin (JARDIANCE) 10 MG tablet Take 10 mg by mouth daily      pantoprazole (PROTONIX) 40 MG tablet Take 1 tablet by mouth 2 times daily TK 1 T PO qam 180 tablet 3    insulin lispro, 1 Unit Dial, (HUMALOG KWIKPEN) 100 UNIT/ML SOPN Inject 5 Units into the skin 3 times daily (before meals) 13.5 mL 3    potassium chloride (K-TAB) 20 MEQ TBCR extended release tablet 1 tablet twice daily 180 tablet 1    metOLazone (ZAROXOLYN) 2.5 MG tablet One Monday/Wednesday/Friday 30 tablet 2    glimepiride (AMARYL) 4 MG tablet TK 1 qam with MEALS 90 tablet 1    atorvastatin (LIPITOR) 20 MG tablet TK 1 T PO Q NIGHT 90 tablet 1    losartan (COZAAR) 50 MG tablet TK 1 T PO QD 90 tablet 1    bumetanide (BUMEX) 1 MG tablet TK 1  T PO  tablet 1    metoprolol tartrate (LOPRESSOR) 50 MG tablet TK 1  T PO  tablet 1    BYDUREON BCISE 2 MG/0.85ML AUIJ INJECT 0.85 ML Q WEEK 1 pen 5    Ferrous Sulfate (IRON) 325 (65 Fe) MG TABS Take 325 mg by mouth every other day 30 tablet 2    blood glucose monitor supplies Alcohol Swabs, Lancets,  Test strips.  Test BID 1 each 0    Ascorbic Acid (VITAMIN C) 250 MG tablet Take 250 mg by mouth Satisfactory

## 2024-07-05 LAB
ANION GAP SERPL CALCULATED.3IONS-SCNC: 2 MEQ/L (ref 4–14)
BUN BLDV-MCNC: 21 MG/DL (ref 7–25)
CALCIUM SERPL-MCNC: 9 MG/DL (ref 8.5–10.5)
CHLORIDE BLD-SCNC: 104 MEQ/L (ref 98–107)
CO2: 35 MEQ/L (ref 21–31)
CREAT SERPL-MCNC: 0.98 MG/DL (ref 0.7–1.3)
CREATININE + EGFR PANEL: 91 ML/MIN
GFR NON-AFRICAN AMERICAN: 75 ML/MIN
GLUCOSE BLD-MCNC: 107 MG/DL (ref 70–99)
POTASSIUM SERPL-SCNC: 4.2 MEQ/L (ref 3.6–5)
SODIUM BLD-SCNC: 141 MEQ/L (ref 135–145)
URATE: 4.2 MG/DL (ref 4.4–7.6)

## 2024-07-10 ENCOUNTER — TELEPHONE (OUTPATIENT)
Dept: PRIMARY CARE CLINIC | Age: 74
End: 2024-07-10

## 2024-07-10 NOTE — TELEPHONE ENCOUNTER
Patient advised that you are out of the office and medication can't be sent wihtout an evaluation. Patient declined going tot [facility] and said he can't get up there

## 2024-07-10 NOTE — TELEPHONE ENCOUNTER
I won't send antibiotics without evaluation.  If he is SOB and wheezing, he needs checked out.  Would recommend [facility]

## 2024-07-10 NOTE — TELEPHONE ENCOUNTER
Pt coughing nothing coming up, taking mucenex but it's not helping enouth, chest congestion, a little sob, afebrile, wheezing. Asking for ATB. Refuses [facility]. Been going on 3-4 days.

## 2024-07-11 ENCOUNTER — OFFICE VISIT (OUTPATIENT)
Dept: PRIMARY CARE CLINIC | Age: 74
End: 2024-07-11
Payer: MEDICARE

## 2024-07-11 VITALS
SYSTOLIC BLOOD PRESSURE: 136 MMHG | DIASTOLIC BLOOD PRESSURE: 86 MMHG | HEIGHT: 70 IN | OXYGEN SATURATION: 96 % | TEMPERATURE: 97.7 F | WEIGHT: 315 LBS | HEART RATE: 84 BPM | BODY MASS INDEX: 45.1 KG/M2

## 2024-07-11 DIAGNOSIS — J40 SINOBRONCHITIS: Primary | ICD-10-CM

## 2024-07-11 DIAGNOSIS — J32.9 SINOBRONCHITIS: Primary | ICD-10-CM

## 2024-07-11 PROCEDURE — 1123F ACP DISCUSS/DSCN MKR DOCD: CPT | Performed by: FAMILY MEDICINE

## 2024-07-11 PROCEDURE — G2211 COMPLEX E/M VISIT ADD ON: HCPCS | Performed by: FAMILY MEDICINE

## 2024-07-11 PROCEDURE — 3075F SYST BP GE 130 - 139MM HG: CPT | Performed by: FAMILY MEDICINE

## 2024-07-11 PROCEDURE — 99213 OFFICE O/P EST LOW 20 MIN: CPT | Performed by: FAMILY MEDICINE

## 2024-07-11 PROCEDURE — 3079F DIAST BP 80-89 MM HG: CPT | Performed by: FAMILY MEDICINE

## 2024-07-11 RX ORDER — DOXYCYCLINE HYCLATE 100 MG
100 TABLET ORAL 2 TIMES DAILY
Qty: 14 TABLET | Refills: 0 | Status: SHIPPED | OUTPATIENT
Start: 2024-07-11 | End: 2024-07-18

## 2024-07-11 RX ORDER — METHYLPREDNISOLONE 4 MG/1
TABLET ORAL
Qty: 21 TABLET | Refills: 0 | Status: SHIPPED | OUTPATIENT
Start: 2024-07-11 | End: 2024-07-17

## 2024-07-11 ASSESSMENT — ENCOUNTER SYMPTOMS
VOMITING: 0
WHEEZING: 0
ABDOMINAL PAIN: 0
NAUSEA: 0
BACK PAIN: 0
CONSTIPATION: 0
SINUS PRESSURE: 0
SINUS PAIN: 0
DIARRHEA: 0
EYE DISCHARGE: 0
SORE THROAT: 0
VOICE CHANGE: 1
RHINORRHEA: 0
SHORTNESS OF BREATH: 0
COUGH: 1

## 2024-07-11 NOTE — PROGRESS NOTES
24  Brigido Larry : 1950 Sex: male  Age: 73 y.o.    Chief Complaint   Patient presents with    Cough     Cough started 3-4 days ago, drainage into chest, lost voice some this morning.  Patient states productive cough with thick green sputum.  Denies sore throat, headache, body aches fever and chills.  Patient refused testing     HPI:  73 y.o. male presents for acute visit due to URI symptoms.    Upper Respiratory Symptoms  Patient complains of congestion, sore throat, nasal blockage, productive cough, and hoarseness.  Patient denies anorexia, chest pain, chills, dizziness, fatigue, fevers, myalgias, nausea, and shortness of breath.  He has had symptoms for 4 days.  Symptoms have unchanged since that time. He has tried Mucinex at home without improvement in symptoms.      He denies a history of asthma.     He has had recent close exposure to someone with similar symptoms.        ROS:  Review of Systems   Constitutional:  Negative for appetite change, chills and fever.   HENT:  Positive for congestion, postnasal drip and voice change. Negative for ear pain, rhinorrhea, sinus pressure, sinus pain and sore throat.    Eyes:  Negative for discharge.   Respiratory:  Positive for cough. Negative for shortness of breath and wheezing.    Cardiovascular:  Negative for chest pain and palpitations.   Gastrointestinal:  Negative for abdominal pain, constipation, diarrhea, nausea and vomiting.   Musculoskeletal:  Negative for back pain.   Skin:  Negative for rash.   Neurological:  Negative for dizziness and headaches.   Hematological:  Negative for adenopathy.   All other systems reviewed and are negative.     Current Outpatient Medications on File Prior to Visit   Medication Sig Dispense Refill    potassium chloride (KLOR-CON M) 20 MEQ extended release tablet Take 1 tablet by mouth 3 times daily (Patient taking differently: Take 1 tablet by mouth 3 times daily Taking once daily) 270 tablet 1    pantoprazole

## 2024-07-19 RX ORDER — SEMAGLUTIDE 2.68 MG/ML
2 INJECTION, SOLUTION SUBCUTANEOUS WEEKLY
Qty: 9 ML | Refills: 1 | Status: SHIPPED | OUTPATIENT
Start: 2024-07-19

## 2024-07-27 DIAGNOSIS — F06.4 ANXIETY DISORDER DUE TO MEDICAL CONDITION: ICD-10-CM

## 2024-07-29 DIAGNOSIS — F06.4 ANXIETY DISORDER DUE TO MEDICAL CONDITION: ICD-10-CM

## 2024-07-29 DIAGNOSIS — M51.16 LUMBAR DISC DISEASE WITH RADICULOPATHY: Primary | ICD-10-CM

## 2024-07-29 RX ORDER — LORAZEPAM 1 MG/1
1 TABLET ORAL 2 TIMES DAILY PRN
Qty: 180 TABLET | OUTPATIENT
Start: 2024-07-29

## 2024-07-29 RX ORDER — HYDROCODONE BITARTRATE AND ACETAMINOPHEN 7.5; 325 MG/1; MG/1
1 TABLET ORAL EVERY 6 HOURS PRN
Qty: 32 TABLET | Refills: 0 | Status: SHIPPED | OUTPATIENT
Start: 2024-07-29 | End: 2024-08-06

## 2024-07-29 RX ORDER — LORAZEPAM 1 MG/1
1 TABLET ORAL 2 TIMES DAILY PRN
Qty: 16 TABLET | Refills: 0 | Status: SHIPPED | OUTPATIENT
Start: 2024-07-29 | End: 2024-08-06

## 2024-07-29 RX ORDER — HYDROCODONE BITARTRATE AND ACETAMINOPHEN 7.5; 325 MG/1; MG/1
1 TABLET ORAL EVERY 6 HOURS PRN
COMMUNITY
End: 2024-07-29 | Stop reason: SDUPTHER

## 2024-08-06 ENCOUNTER — OFFICE VISIT (OUTPATIENT)
Dept: PRIMARY CARE CLINIC | Age: 74
End: 2024-08-06

## 2024-08-06 VITALS
HEIGHT: 70 IN | SYSTOLIC BLOOD PRESSURE: 124 MMHG | HEART RATE: 72 BPM | BODY MASS INDEX: 50.36 KG/M2 | DIASTOLIC BLOOD PRESSURE: 86 MMHG | OXYGEN SATURATION: 92 % | TEMPERATURE: 97.7 F

## 2024-08-06 DIAGNOSIS — I10 BENIGN ESSENTIAL HYPERTENSION: ICD-10-CM

## 2024-08-06 DIAGNOSIS — E55.9 VITAMIN D DEFICIENCY: ICD-10-CM

## 2024-08-06 DIAGNOSIS — E11.65 TYPE 2 DIABETES MELLITUS WITH HYPERGLYCEMIA, WITHOUT LONG-TERM CURRENT USE OF INSULIN (HCC): Primary | ICD-10-CM

## 2024-08-06 DIAGNOSIS — I50.32 CHRONIC CONGESTIVE HEART FAILURE WITH LEFT VENTRICULAR DIASTOLIC DYSFUNCTION (HCC): ICD-10-CM

## 2024-08-06 DIAGNOSIS — M51.16 LUMBAR DISC DISEASE WITH RADICULOPATHY: ICD-10-CM

## 2024-08-06 DIAGNOSIS — I13.0 CARDIORENAL SYNDROME WITH RENAL FAILURE, STAGE 1-4 OR UNSPECIFIED CHRONIC KIDNEY DISEASE, WITH HEART FAILURE (HCC): ICD-10-CM

## 2024-08-06 DIAGNOSIS — I87.309 CHRONIC PERIPHERAL VENOUS HYPERTENSION: ICD-10-CM

## 2024-08-06 DIAGNOSIS — F06.4 ANXIETY DISORDER DUE TO MEDICAL CONDITION: ICD-10-CM

## 2024-08-06 DIAGNOSIS — I48.91 ATRIAL FIBRILLATION WITH CONTROLLED VENTRICULAR RESPONSE (HCC): ICD-10-CM

## 2024-08-06 DIAGNOSIS — R26.2 AMBULATORY DYSFUNCTION: ICD-10-CM

## 2024-08-06 PROBLEM — C44.91 NODULAR BASAL CELL CARCINOMA (BCC): Status: RESOLVED | Noted: 2022-02-24 | Resolved: 2024-08-06

## 2024-08-06 RX ORDER — HYDROCODONE BITARTRATE AND ACETAMINOPHEN 7.5; 325 MG/1; MG/1
1 TABLET ORAL EVERY 6 HOURS PRN
Qty: 120 TABLET | Refills: 0 | Status: SHIPPED | OUTPATIENT
Start: 2024-09-03 | End: 2024-10-03

## 2024-08-06 RX ORDER — HYDROCODONE BITARTRATE AND ACETAMINOPHEN 7.5; 325 MG/1; MG/1
1 TABLET ORAL EVERY 6 HOURS PRN
Qty: 120 TABLET | Refills: 0 | Status: SHIPPED | OUTPATIENT
Start: 2024-08-06 | End: 2024-09-05

## 2024-08-06 RX ORDER — LORAZEPAM 1 MG/1
1 TABLET ORAL 2 TIMES DAILY PRN
Qty: 180 TABLET | Refills: 0 | Status: SHIPPED | OUTPATIENT
Start: 2024-08-06 | End: 2024-11-04

## 2024-08-06 RX ORDER — SPIRONOLACTONE 25 MG/1
25 TABLET ORAL
COMMUNITY

## 2024-08-06 RX ORDER — HYDROCODONE BITARTRATE AND ACETAMINOPHEN 7.5; 325 MG/1; MG/1
1 TABLET ORAL EVERY 6 HOURS PRN
Qty: 120 TABLET | Refills: 0 | Status: SHIPPED | OUTPATIENT
Start: 2024-10-01 | End: 2024-10-31

## 2024-08-06 ASSESSMENT — ENCOUNTER SYMPTOMS
DIARRHEA: 0
ABDOMINAL PAIN: 0
CONSTIPATION: 0
SHORTNESS OF BREATH: 0
VOMITING: 0
COUGH: 0
BACK PAIN: 1
WHEEZING: 0
NAUSEA: 0

## 2024-08-06 NOTE — PROGRESS NOTES
History:   Procedure Laterality Date    CERVICAL FUSION      COLONOSCOPY      EYE SURGERY      cataract with lol implants - OU    HERNIA REPAIR      umbilical     RECTAL SURGERY      transanal excision rectal cancer     SIGMOIDOSCOPY      procotoscopy     UPPER GASTROINTESTINAL ENDOSCOPY  06/15/2020    Radha- erosive gastritis     Family History   Problem Relation Age of Onset    Cancer Mother         breast     Diabetes Father     Diabetes Brother     Heart Attack Brother     Other Brother         valvular heart disease    Cancer Brother         lung      Social History     Socioeconomic History    Marital status:      Spouse name: Not on file    Number of children: Not on file    Years of education: Not on file    Highest education level: Not on file   Occupational History    Not on file   Tobacco Use    Smoking status: Never    Smokeless tobacco: Never   Vaping Use    Vaping Use: Never used   Substance and Sexual Activity    Alcohol use: Never    Drug use: Never    Sexual activity: Not on file   Other Topics Concern    Not on file   Social History Narrative    Not on file     Social Determinants of Health     Financial Resource Strain: Low Risk  (5/2/2024)    Overall Financial Resource Strain (CARDIA)     Difficulty of Paying Living Expenses: Not hard at all   Food Insecurity: No Food Insecurity (5/2/2024)    Hunger Vital Sign     Worried About Running Out of Food in the Last Year: Never true     Ran Out of Food in the Last Year: Never true   Transportation Needs: Unknown (5/2/2024)    PRAPARE - Transportation     Lack of Transportation (Medical): Not on file     Lack of Transportation (Non-Medical): No   Physical Activity: Inactive (8/9/2022)    Exercise Vital Sign     Days of Exercise per Week: 0 days     Minutes of Exercise per Session: 0 min   Stress: Not on file   Social Connections: Not on file   Intimate Partner Violence: Not on file   Housing Stability: Unknown (5/2/2024)    Housing Stability

## 2024-08-26 RX ORDER — ALLOPURINOL 100 MG/1
100 TABLET ORAL DAILY
Qty: 90 TABLET | Refills: 1 | Status: SHIPPED | OUTPATIENT
Start: 2024-08-26

## 2024-10-07 ENCOUNTER — TELEPHONE (OUTPATIENT)
Dept: PRIMARY CARE CLINIC | Age: 74
End: 2024-10-07

## 2024-10-07 DIAGNOSIS — M51.16 LUMBAR DISC DISEASE WITH RADICULOPATHY: ICD-10-CM

## 2024-10-07 DIAGNOSIS — I50.32 CHRONIC CONGESTIVE HEART FAILURE WITH LEFT VENTRICULAR DIASTOLIC DYSFUNCTION (HCC): Primary | ICD-10-CM

## 2024-10-28 DIAGNOSIS — M51.16 LUMBAR DISC DISEASE WITH RADICULOPATHY: ICD-10-CM

## 2024-10-28 DIAGNOSIS — I50.32 CHRONIC CONGESTIVE HEART FAILURE WITH LEFT VENTRICULAR DIASTOLIC DYSFUNCTION (HCC): ICD-10-CM

## 2024-10-28 RX ORDER — HYDROCODONE BITARTRATE AND ACETAMINOPHEN 7.5; 325 MG/1; MG/1
1 TABLET ORAL EVERY 6 HOURS PRN
Qty: 56 TABLET | Refills: 0 | Status: SHIPPED | OUTPATIENT
Start: 2024-10-28 | End: 2024-11-11

## 2024-10-29 RX ORDER — BUMETANIDE 1 MG/1
TABLET ORAL
Qty: 180 TABLET | Refills: 1 | Status: SHIPPED | OUTPATIENT
Start: 2024-10-29

## 2024-11-11 ENCOUNTER — OFFICE VISIT (OUTPATIENT)
Dept: PRIMARY CARE CLINIC | Age: 74
End: 2024-11-11

## 2024-11-11 VITALS
SYSTOLIC BLOOD PRESSURE: 122 MMHG | HEART RATE: 95 BPM | DIASTOLIC BLOOD PRESSURE: 84 MMHG | OXYGEN SATURATION: 94 % | BODY MASS INDEX: 45.1 KG/M2 | HEIGHT: 70 IN | TEMPERATURE: 98.6 F | WEIGHT: 315 LBS

## 2024-11-11 DIAGNOSIS — M51.16 LUMBAR DISC DISEASE WITH RADICULOPATHY: ICD-10-CM

## 2024-11-11 DIAGNOSIS — I13.0 CARDIORENAL SYNDROME WITH RENAL FAILURE, STAGE 1-4 OR UNSPECIFIED CHRONIC KIDNEY DISEASE, WITH HEART FAILURE (HCC): ICD-10-CM

## 2024-11-11 DIAGNOSIS — E78.2 MIXED HYPERLIPIDEMIA: ICD-10-CM

## 2024-11-11 DIAGNOSIS — Z23 NEED FOR INFLUENZA VACCINATION: ICD-10-CM

## 2024-11-11 DIAGNOSIS — E55.9 VITAMIN D DEFICIENCY: ICD-10-CM

## 2024-11-11 DIAGNOSIS — E11.65 TYPE 2 DIABETES MELLITUS WITH HYPERGLYCEMIA, WITHOUT LONG-TERM CURRENT USE OF INSULIN (HCC): Primary | ICD-10-CM

## 2024-11-11 DIAGNOSIS — K21.9 GASTROESOPHAGEAL REFLUX DISEASE WITHOUT ESOPHAGITIS: ICD-10-CM

## 2024-11-11 DIAGNOSIS — I50.32 CHRONIC CONGESTIVE HEART FAILURE WITH LEFT VENTRICULAR DIASTOLIC DYSFUNCTION (HCC): ICD-10-CM

## 2024-11-11 DIAGNOSIS — I10 BENIGN ESSENTIAL HYPERTENSION: ICD-10-CM

## 2024-11-11 DIAGNOSIS — F06.4 ANXIETY DISORDER DUE TO MEDICAL CONDITION: ICD-10-CM

## 2024-11-11 RX ORDER — METOLAZONE 2.5 MG/1
2.5 TABLET ORAL
Qty: 36 TABLET | Refills: 1 | Status: SHIPPED | OUTPATIENT
Start: 2024-11-11 | End: 2025-02-09

## 2024-11-11 RX ORDER — LOSARTAN POTASSIUM 50 MG/1
50 TABLET ORAL DAILY
Qty: 90 TABLET | Refills: 1 | Status: SHIPPED | OUTPATIENT
Start: 2024-11-11

## 2024-11-11 RX ORDER — HYDROCODONE BITARTRATE AND ACETAMINOPHEN 7.5; 325 MG/1; MG/1
1 TABLET ORAL EVERY 6 HOURS PRN
Qty: 120 TABLET | Refills: 0 | Status: SHIPPED | OUTPATIENT
Start: 2024-12-09 | End: 2025-01-08

## 2024-11-11 RX ORDER — HYDROCODONE BITARTRATE AND ACETAMINOPHEN 7.5; 325 MG/1; MG/1
1 TABLET ORAL EVERY 6 HOURS PRN
Qty: 120 TABLET | Refills: 0 | Status: SHIPPED | OUTPATIENT
Start: 2024-11-11 | End: 2024-12-11

## 2024-11-11 RX ORDER — LORAZEPAM 1 MG/1
1 TABLET ORAL 2 TIMES DAILY PRN
Qty: 180 TABLET | Refills: 0 | Status: SHIPPED | OUTPATIENT
Start: 2024-11-11 | End: 2025-02-09

## 2024-11-11 RX ORDER — PANTOPRAZOLE SODIUM 40 MG/1
40 TABLET, DELAYED RELEASE ORAL DAILY
Qty: 90 TABLET | Refills: 1 | Status: SHIPPED | OUTPATIENT
Start: 2024-11-11

## 2024-11-11 RX ORDER — ATORVASTATIN CALCIUM 20 MG/1
20 TABLET, FILM COATED ORAL DAILY
Qty: 90 TABLET | Refills: 1 | Status: SHIPPED | OUTPATIENT
Start: 2024-11-11

## 2024-11-11 RX ORDER — GLIMEPIRIDE 4 MG/1
4 TABLET ORAL 2 TIMES DAILY
Qty: 180 TABLET | Refills: 1 | Status: SHIPPED | OUTPATIENT
Start: 2024-11-11

## 2024-11-11 RX ORDER — HYDROCODONE BITARTRATE AND ACETAMINOPHEN 7.5; 325 MG/1; MG/1
1 TABLET ORAL EVERY 6 HOURS PRN
Qty: 120 TABLET | Refills: 0 | Status: SHIPPED | OUTPATIENT
Start: 2025-01-06 | End: 2025-02-05

## 2024-11-11 RX ORDER — INSULIN GLARGINE 300 U/ML
58 INJECTION, SOLUTION SUBCUTANEOUS DAILY
Qty: 4 EACH | Refills: 1 | Status: SHIPPED | OUTPATIENT
Start: 2024-11-11 | End: 2025-02-09

## 2024-11-11 RX ORDER — METOPROLOL TARTRATE 50 MG
TABLET ORAL
Qty: 180 TABLET | Refills: 1 | Status: SHIPPED | OUTPATIENT
Start: 2024-11-11

## 2024-11-11 RX ORDER — SEMAGLUTIDE 2.68 MG/ML
2 INJECTION, SOLUTION SUBCUTANEOUS WEEKLY
Qty: 9 ML | Refills: 1 | Status: SHIPPED | OUTPATIENT
Start: 2024-11-11

## 2024-11-11 RX ORDER — POTASSIUM CHLORIDE 1500 MG/1
20 TABLET, EXTENDED RELEASE ORAL DAILY
Qty: 90 TABLET | Refills: 1 | Status: SHIPPED | OUTPATIENT
Start: 2024-11-11 | End: 2025-02-09

## 2024-11-11 ASSESSMENT — ENCOUNTER SYMPTOMS
WHEEZING: 0
ABDOMINAL PAIN: 0
CONSTIPATION: 0
DIARRHEA: 0
COUGH: 0
NAUSEA: 0
BACK PAIN: 1
VOMITING: 0
SHORTNESS OF BREATH: 0

## 2024-11-11 NOTE — ASSESSMENT & PLAN NOTE
Follows with Nephro    Orders:    Comprehensive Metabolic Panel; Future    Uric Acid; Future    PTH, Intact; Future    Phosphorus; Future    Magnesium; Future    Calcium, Ionized; Future    Urinalysis; Future    Microalbumin, Ur; Future

## 2024-11-11 NOTE — ASSESSMENT & PLAN NOTE
Chronic, at goal (stable), continue current treatment plan    Orders:    losartan (COZAAR) 50 MG tablet; Take 1 tablet by mouth daily    metoprolol tartrate (LOPRESSOR) 50 MG tablet; TAKE 1 TABLET BY MOUTH TWICE DAILY

## 2024-11-11 NOTE — ASSESSMENT & PLAN NOTE
OARRS appropriate    Orders:    HYDROcodone-acetaminophen (NORCO) 7.5-325 MG per tablet; Take 1 tablet by mouth every 6 hours as needed for Pain for up to 30 days. Intended supply: 14 days. Take lowest dose possible to manage pain Max Daily Amount: 4 tablets    HYDROcodone-acetaminophen (NORCO) 7.5-325 MG per tablet; Take 1 tablet by mouth every 6 hours as needed for Pain for up to 30 days. Intended supply: 3 days. Take lowest dose possible to manage pain Max Daily Amount: 4 tablets    HYDROcodone-acetaminophen (NORCO) 7.5-325 MG per tablet; Take 1 tablet by mouth every 6 hours as needed for Pain for up to 30 days. Intended supply: 3 days. Take lowest dose possible to manage pain Max Daily Amount: 4 tablets

## 2024-11-11 NOTE — ASSESSMENT & PLAN NOTE
Chronic, at goal (stable), continue current treatment plan    Orders:    atorvastatin (LIPITOR) 20 MG tablet; Take 1 tablet by mouth daily

## 2024-11-11 NOTE — ASSESSMENT & PLAN NOTE
OARRS reviewed and is appropriate    Orders:    LORazepam (ATIVAN) 1 MG tablet; Take 1 tablet by mouth 2 times daily as needed for Anxiety for up to 90 days. Max Daily Amount: 2 mg

## 2024-11-11 NOTE — ASSESSMENT & PLAN NOTE
Monitored by specialist- no acute findings meriting change in the plan    Orders:    metOLazone (ZAROXOLYN) 2.5 MG tablet; Take 1 tablet by mouth three times a week    potassium chloride (KLOR-CON M) 20 MEQ extended release tablet; Take 1 tablet by mouth daily Taking once daily

## 2024-11-11 NOTE — ASSESSMENT & PLAN NOTE
Not at goal.  Discussed dietary changes.  Due for labs and refills.     Orders:    Continuous Glucose Sensor (FREESTYLE MEDHAT 2 SENSOR) MISC; USE AS DIRECTED    Insulin Glargine, 2 Unit Dial, (TOUJEO MAX SOLOSTAR) 300 UNIT/ML concentrated injection pen; Inject 58 Units into the skin daily    semaglutide, 2 MG/DOSE, (OZEMPIC, 2 MG/DOSE,) 8 MG/3ML SOPN sc injection; Inject 2 mg into the skin once a week    glimepiride (AMARYL) 4 MG tablet; Take 1 tablet by mouth 2 times daily    CBC with Auto Differential; Future    Comprehensive Metabolic Panel; Future    Hemoglobin A1C; Future    Lipid Panel; Future    TSH; Future    Vitamin B12 & Folate; Future    Urinalysis; Future    Microalbumin, Ur; Future

## 2024-11-11 NOTE — PROGRESS NOTES
24  Brigido Larry   : 1950 Sex: male  Age: 74 y.o.    Chief Complaint   Patient presents with    Diabetes    Chronic Pain    Medication Refill     HPI:  74 y.o. male patient presents today for 3 month(s) follow up of chronic medical conditions, medication refills and FBW.  Patient's chart, medical, surgical and medication history all reviewed.      Diabetes Mellitus  74 y.o. male presents for follow up of type 2 diabetes.  Current diabetic medications include: oral agents (dual therapy): glimepiride (Amaryl), Jardiance, insulin injections: Toujeo and SSI prn for BS >200 and Ozempic .   Previous medications tried include: oral agents (triple therapy): metformin (generic), pioglitazone (Actos), sitagliptin (Januvia) and insulin injections: Lantus : nightly , but failed due to various reasons.   Most recent HgA1c was 7.9% (May 2024)---7.9% (2023)---7.8% (2023)---8.3% (2022)---7.7% (2022)---9.1% (2021)---9.8% (2021)---6.7% (2020)--- 6.5% (2020)---7.4% (2019).  His most recent TSH was 0.66.  LDL is 61.  Renal function is stable- seen by Nephro and had medications adjusted to help with hypokalemia.  The patient has evidence of end organ damage including nephropathy, peripheral neuropathy and peripheral vascular disease.  He does not see a Podiatrist for foot care .  Last eye exam was unknown.        Hypertension   The patient presents today for follow up of HTN.  The problem is well controlled.  Risk factors for coronary artery disease include Age > 30, male, HTN, diabetes and elevated cholesterol. Current treatments include losartan (Cozaar), Spironolactone and metoprolol (Lopressor, Toprol). The patient is compliant all of the time.  Lifestyle changes the patient has made include  none .  Today the patient is complaining of peripheral edema.  He does wear compression stockings.         He has a history of CHF which required hospitalization

## 2024-11-12 LAB
25-HYDROXY VITAMIN D-2: 67 NG/ML (ref 30–100)
A/G RATIO: 1.2 RATIO (ref 1.1–2.2)
ALBUMIN: 3.8 G/DL (ref 3.5–5)
ALP BLD-CCNC: 97 U/L (ref 42–121)
ALT SERPL-CCNC: 18 U/L (ref 7–52)
ANION GAP SERPL CALCULATED.3IONS-SCNC: 5 MEQ/L (ref 4–14)
APPEARANCE, BODY FLUID: ABNORMAL
AST SERPL-CCNC: 16 U/L (ref 10–41)
BASOPHILS ABSOLUTE: 0 K/UL (ref 0–0.2)
BASOPHILS RELATIVE PERCENT: 0.3 % (ref 0–1.5)
BILIRUB SERPL-MCNC: 0.5 MG/DL (ref 0.3–1.5)
BILIRUBIN, URINE: NEGATIVE
BUN BLDV-MCNC: 21 MG/DL (ref 7–25)
CALCIUM IONIZED: 1.12 MMOL/L (ref 1.12–1.33)
CALCIUM SERPL-MCNC: 8.8 MG/DL (ref 8.5–10.5)
CHLORIDE BLD-SCNC: 101 MEQ/L (ref 98–107)
CHOLESTEROL, TOTAL: 100 MG/DL (ref 0–199)
CO2: 32 MEQ/L (ref 21–31)
COLOR, UA: YELLOW
CREAT SERPL-MCNC: 0.96 MG/DL (ref 0.7–1.3)
CREATININE + EGFR PANEL: 93 ML/MIN
CREATININE URINE: 92.77 MG/DL
EOSINOPHILS ABSOLUTE: 0.2 K/UL (ref 0–0.33)
EOSINOPHILS RELATIVE PERCENT: 1.9 % (ref 0–3)
ERYTHROCYTES URINE: NORMAL /HPF
FOLATE: 10.9 NG/ML
GFR NON-AFRICAN AMERICAN: 77 ML/MIN
GLOBULIN: 3.1 G/DL (ref 1.9–3.9)
GLUCOSE BLD-MCNC: 126 MG/DL (ref 70–99)
GLUCOSE URINE: >=500 MG/DL
HCT VFR BLD CALC: 48.3 % (ref 41–50)
HDLC SERPL-MCNC: 34 MG/DL
HEMOGLOBIN: 15.4 G/DL (ref 13.5–16.5)
KETONES, URINE: NEGATIVE MG/DL
LDL CHOLESTEROL: 58 MG/DL (ref 0–99)
LDL/HDL RATIO: 1.7 RATIO
LYMPHOCYTES ABSOLUTE: 1.6 K/UL (ref 1.1–4.8)
LYMPHOCYTES RELATIVE PERCENT: 18.1 % (ref 24–44)
MAGNESIUM: 1.7 MEQ/L (ref 1.6–2.6)
MCH RBC QN AUTO: 26 PG (ref 28–34)
MCHC RBC AUTO-ENTMCNC: 31.9 G/DL (ref 33–37)
MCV RBC AUTO: 81.3 FL (ref 80–100)
MICROALBUMIN/CREAT 24H UR: 0.8 MG/DL (ref 0–1.8)
MICROSCOPIC URINE: YES
MONOCYTES ABSOLUTE: 0.6 K/UL (ref 0.2–0.7)
MONOCYTES RELATIVE PERCENT: 7.5 % (ref 3.4–9)
NEUTROPHILS ABSOLUTE: 6.2 K/UL (ref 1.83–8.7)
NEUTROPHILS RELATIVE PERCENT: 72.2 % (ref 40–74)
NITRATE, UA: NEGATIVE
PDW BLD-RTO: 18.4 % (ref 10.9–14.3)
PH, URINE: 6 (ref 4.6–8)
PHOSPHORUS: 3.1 MG/DL (ref 2.5–4.6)
PLATELET # BLD: 290 K/UL (ref 150–450)
PMV BLD AUTO: 8.5 FL (ref 7.4–10.4)
POTASSIUM SERPL-SCNC: 4 MEQ/L (ref 3.6–5)
PROTEIN UA: NEGATIVE MG/DL
PROTEIN, URINE, RANDOM: 11 MG/DL (ref 0–9)
PROTEIN/CREAT RATIO URINE RAN: 118.6 MG/G (ref 0–199.9)
PTH INTACT: 49 PG/ML (ref 12–88)
RBC # BLD: 5.93 M/UL (ref 4.5–5.5)
RBC URINE: NEGATIVE
REFLEX: NO
SODIUM BLD-SCNC: 138 MEQ/L (ref 135–145)
SPECIFIC GRAVITY UA: 1.02 (ref 1–1.03)
TOTAL PROTEIN: 6.9 G/DL (ref 6.2–8)
TRANSITIONAL EPITHELIAL: NORMAL /HPF
TRIGL SERPL-MCNC: 103 MG/DL (ref 0–149)
TSH SERPL DL<=0.05 MIU/L-ACNC: 0.92 UIU/ML (ref 0.34–5.6)
URATE: 5.2 MG/DL (ref 4.4–7.6)
URINE CULTURE REFLEX: ABNORMAL
UROBILINOGEN, URINE: NEGATIVE MG/DL
VITAMIN B-12: 237 PG/ML (ref 180–914)
WBC # BLD: 8.7 K/UL (ref 4.5–11)
WBC COUNT, UR AUTO: NORMAL /HPF
WBC URINE: ABNORMAL

## 2024-11-13 LAB
ESTIMATED AVERAGE GLUCOSE: 160 MG/DL
HBA1C MFR BLD: 7.2 % (ref 4–6)

## 2024-11-14 LAB — URINE CULTURE, ROUTINE: NORMAL

## 2025-01-15 ENCOUNTER — TELEPHONE (OUTPATIENT)
Dept: PRIMARY CARE CLINIC | Age: 75
End: 2025-01-15

## 2025-01-15 NOTE — TELEPHONE ENCOUNTER
The pt's wife is calling because for a little over a week now the pt has been getting sob on exertion, he was at his truck and walked to the house and couldn't make it he had to stop and take a break then when he came into the house he had to sit and catch his breath again, the pt's wife says she wants him to go to the ED but the pt can't walk to his truck

## 2025-01-17 LAB
LEFT VENTRICULAR EJECTION FRACTION MODE: NORMAL
LV EF: NORMAL %

## 2025-01-28 ENCOUNTER — TELEPHONE (OUTPATIENT)
Dept: PRIMARY CARE CLINIC | Age: 75
End: 2025-01-28

## 2025-01-28 NOTE — TELEPHONE ENCOUNTER
----- Message from Shravan ROSS sent at 1/28/2025  1:12 PM EST -----    [facility] Appointment Request    Patient needs appointment for [facility] Appointment Type: Existing Condition Follow Up.    Patient Requested Dates(s): February 5 or 6 2025  Patient Requested Time: 1 pm   Provider Name: Meeta Marquez    Reason for Appointment Request: Established Patient - Available appointments did not meet patient need  --------------------------------------------------------------------------------------------------------------------------    Relationship to Patient: Spouse/Partner wife     Call Back Information: OK to leave message on voicemail  Preferred Call Back Number:   [phone]

## 2025-01-28 NOTE — TELEPHONE ENCOUNTER
Pt has a urologist apt in the afternoon at the same time as his apt with you, his wife is est at 10 am with you that day. Is patient able to just come to that apt? Its in a half hour slot

## 2025-01-31 ENCOUNTER — TELEPHONE (OUTPATIENT)
Dept: PRIMARY CARE CLINIC | Age: 75
End: 2025-01-31

## 2025-01-31 DIAGNOSIS — I48.91 ATRIAL FIBRILLATION WITH CONTROLLED VENTRICULAR RESPONSE (HCC): Primary | ICD-10-CM

## 2025-01-31 DIAGNOSIS — I50.32 CHRONIC CONGESTIVE HEART FAILURE WITH LEFT VENTRICULAR DIASTOLIC DYSFUNCTION (HCC): ICD-10-CM

## 2025-01-31 DIAGNOSIS — I10 BENIGN ESSENTIAL HYPERTENSION: ICD-10-CM

## 2025-01-31 NOTE — TELEPHONE ENCOUNTER
Wife calling. Patient was taking his Metoprolol twice a day, but since patient has been in and out of hospital, his medication was changed to one a day. Bottle at home reads Metoprolol Tartrate once a day. Wife is asking if you him to continue on it once a day or go back to his twice a day and does he take this at night or in morning if he needs to stay on once a day.

## 2025-01-31 NOTE — TELEPHONE ENCOUNTER
If the Rx is Metoprolol Tartrate, then it is the short acting version and needs to be taken BID.

## 2025-02-03 PROBLEM — I30.9 ACUTE PERICARDIAL EFFUSION: Status: ACTIVE | Noted: 2025-02-03

## 2025-02-03 RX ORDER — METOPROLOL SUCCINATE 50 MG/1
50 TABLET, EXTENDED RELEASE ORAL DAILY
Qty: 30 TABLET | Refills: 5 | Status: SHIPPED | OUTPATIENT
Start: 2025-02-03

## 2025-02-03 RX ORDER — METOPROLOL SUCCINATE 50 MG/1
50 TABLET, EXTENDED RELEASE ORAL DAILY
COMMUNITY
Start: 2025-01-24 | End: 2025-02-03

## 2025-02-03 RX ORDER — LOSARTAN POTASSIUM 25 MG/1
12.5 TABLET ORAL DAILY
Qty: 15 TABLET | Refills: 5 | Status: SHIPPED | OUTPATIENT
Start: 2025-02-03

## 2025-02-03 RX ORDER — BUMETANIDE 2 MG/1
2 TABLET ORAL 2 TIMES DAILY
COMMUNITY
Start: 2025-01-24 | End: 2025-02-03

## 2025-02-03 RX ORDER — LOSARTAN POTASSIUM 25 MG/1
25 TABLET ORAL DAILY
COMMUNITY
Start: 2025-01-24 | End: 2025-02-03 | Stop reason: SDUPTHER

## 2025-02-03 RX ORDER — BUMETANIDE 2 MG/1
2 TABLET ORAL 2 TIMES DAILY
Qty: 60 TABLET | Refills: 5 | Status: SHIPPED | OUTPATIENT
Start: 2025-02-03

## 2025-02-03 RX ORDER — APIXABAN 5 MG/1
5 TABLET, FILM COATED ORAL 2 TIMES DAILY
COMMUNITY
Start: 2025-01-24 | End: 2025-02-03 | Stop reason: SDUPTHER

## 2025-02-03 NOTE — TELEPHONE ENCOUNTER
I was able to get some of the hospital records from [place].  Patient underwent pericardiocentesis and had 2L drained from pericardial sack.  He had Bumex increased to 2 mg BID, Metoprolol changed to XL 50 mg QD, Losartan reduced to 12.5 mg and Eliquis added.  Scripts sent to the pharmacy.  Has follow up on Friday.  Missed his appt today.

## 2025-02-05 DIAGNOSIS — I10 BENIGN ESSENTIAL HYPERTENSION: ICD-10-CM

## 2025-02-05 RX ORDER — LOSARTAN POTASSIUM 25 MG/1
12.5 TABLET ORAL DAILY
Qty: 45 TABLET | OUTPATIENT
Start: 2025-02-05

## 2025-02-07 ENCOUNTER — OFFICE VISIT (OUTPATIENT)
Dept: FAMILY MEDICINE CLINIC | Age: 75
End: 2025-02-07

## 2025-02-07 VITALS
HEART RATE: 86 BPM | OXYGEN SATURATION: 95 % | DIASTOLIC BLOOD PRESSURE: 84 MMHG | HEIGHT: 70 IN | BODY MASS INDEX: 45.1 KG/M2 | SYSTOLIC BLOOD PRESSURE: 134 MMHG | TEMPERATURE: 97.9 F | WEIGHT: 315 LBS | RESPIRATION RATE: 18 BRPM

## 2025-02-07 DIAGNOSIS — I10 BENIGN ESSENTIAL HYPERTENSION: ICD-10-CM

## 2025-02-07 DIAGNOSIS — E55.9 VITAMIN D INSUFFICIENCY: ICD-10-CM

## 2025-02-07 DIAGNOSIS — I50.32 CHRONIC CONGESTIVE HEART FAILURE WITH LEFT VENTRICULAR DIASTOLIC DYSFUNCTION (HCC): ICD-10-CM

## 2025-02-07 DIAGNOSIS — I30.9 ACUTE PERICARDIAL EFFUSION: ICD-10-CM

## 2025-02-07 DIAGNOSIS — I48.91 ATRIAL FIBRILLATION WITH CONTROLLED VENTRICULAR RESPONSE (HCC): ICD-10-CM

## 2025-02-07 DIAGNOSIS — M51.16 LUMBAR DISC DISEASE WITH RADICULOPATHY: ICD-10-CM

## 2025-02-07 DIAGNOSIS — Z12.5 SCREENING FOR PROSTATE CANCER: ICD-10-CM

## 2025-02-07 DIAGNOSIS — E11.65 TYPE 2 DIABETES MELLITUS WITH HYPERGLYCEMIA, WITHOUT LONG-TERM CURRENT USE OF INSULIN (HCC): Primary | ICD-10-CM

## 2025-02-07 DIAGNOSIS — F06.4 ANXIETY DISORDER DUE TO MEDICAL CONDITION: ICD-10-CM

## 2025-02-07 DIAGNOSIS — E66.01 MORBID (SEVERE) OBESITY DUE TO EXCESS CALORIES: ICD-10-CM

## 2025-02-07 PROBLEM — I13.10 CARDIORENAL SYNDROME: Status: RESOLVED | Noted: 2023-02-14 | Resolved: 2025-02-07

## 2025-02-07 RX ORDER — HYDROCODONE BITARTRATE AND ACETAMINOPHEN 7.5; 325 MG/1; MG/1
1 TABLET ORAL EVERY 6 HOURS PRN
Qty: 120 TABLET | Refills: 0 | Status: SHIPPED | OUTPATIENT
Start: 2025-02-07 | End: 2025-03-09

## 2025-02-07 RX ORDER — LORAZEPAM 1 MG/1
1 TABLET ORAL 2 TIMES DAILY PRN
Qty: 180 TABLET | Refills: 0 | Status: SHIPPED | OUTPATIENT
Start: 2025-02-07 | End: 2025-05-08

## 2025-02-07 RX ORDER — BUSPIRONE HYDROCHLORIDE 15 MG/1
15 TABLET ORAL 3 TIMES DAILY
Qty: 90 TABLET | Refills: 2 | Status: SHIPPED | OUTPATIENT
Start: 2025-02-07 | End: 2025-03-09

## 2025-02-07 RX ORDER — SULFAMETHOXAZOLE AND TRIMETHOPRIM 800; 160 MG/1; MG/1
1 TABLET ORAL 2 TIMES DAILY
COMMUNITY
Start: 2025-02-04

## 2025-02-07 RX ORDER — HYDROCODONE BITARTRATE AND ACETAMINOPHEN 7.5; 325 MG/1; MG/1
1 TABLET ORAL EVERY 6 HOURS PRN
Qty: 120 TABLET | Refills: 0 | Status: SHIPPED | OUTPATIENT
Start: 2025-03-07 | End: 2025-04-06

## 2025-02-07 RX ORDER — HYDROCODONE BITARTRATE AND ACETAMINOPHEN 7.5; 325 MG/1; MG/1
1 TABLET ORAL EVERY 6 HOURS PRN
Qty: 120 TABLET | Refills: 0 | Status: SHIPPED | OUTPATIENT
Start: 2025-04-04 | End: 2025-05-04

## 2025-02-07 RX ORDER — ALLOPURINOL 100 MG/1
100 TABLET ORAL DAILY
Qty: 90 TABLET | Refills: 1 | Status: SHIPPED | OUTPATIENT
Start: 2025-02-07

## 2025-02-07 SDOH — ECONOMIC STABILITY: FOOD INSECURITY: WITHIN THE PAST 12 MONTHS, THE FOOD YOU BOUGHT JUST DIDN'T LAST AND YOU DIDN'T HAVE MONEY TO GET MORE.: NEVER TRUE

## 2025-02-07 SDOH — ECONOMIC STABILITY: FOOD INSECURITY: WITHIN THE PAST 12 MONTHS, YOU WORRIED THAT YOUR FOOD WOULD RUN OUT BEFORE YOU GOT MONEY TO BUY MORE.: NEVER TRUE

## 2025-02-07 ASSESSMENT — ENCOUNTER SYMPTOMS
SHORTNESS OF BREATH: 0
NAUSEA: 0
DIARRHEA: 0
VOMITING: 0
WHEEZING: 0
CONSTIPATION: 0
COUGH: 0
ABDOMINAL PAIN: 0
BACK PAIN: 1

## 2025-02-07 ASSESSMENT — PATIENT HEALTH QUESTIONNAIRE - PHQ9
1. LITTLE INTEREST OR PLEASURE IN DOING THINGS: NOT AT ALL
SUM OF ALL RESPONSES TO PHQ QUESTIONS 1-9: 0
SUM OF ALL RESPONSES TO PHQ9 QUESTIONS 1 & 2: 0
SUM OF ALL RESPONSES TO PHQ QUESTIONS 1-9: 0
SUM OF ALL RESPONSES TO PHQ QUESTIONS 1-9: 0
2. FEELING DOWN, DEPRESSED OR HOPELESS: NOT AT ALL
SUM OF ALL RESPONSES TO PHQ QUESTIONS 1-9: 0

## 2025-02-07 NOTE — PROGRESS NOTES
25  Brigido Larry   : 1950 Sex: male  Age: 74 y.o.    Chief Complaint   Patient presents with    Diabetes     HPI:  74 y.o. male patient presents today for 3 month(s) follow up of chronic medical conditions, medication refills and FBW.  Patient's chart, medical, surgical and medication history all reviewed.      Diabetes Mellitus  74 y.o. male presents for follow up of type 2 diabetes.  Current diabetic medications include: oral agents (dual therapy): glimepiride (Amaryl), Jardiance, insulin injections: Toujeo and SSI prn for BS >200 and Ozempic .   Previous medications tried include: oral agents (triple therapy): metformin (generic), pioglitazone (Actos), sitagliptin (Januvia) and insulin injections: Lantus : nightly , but failed due to various reasons.   Most recent HgA1c was 7.2% (2024)---7.9% (May 2024)---7.9% (2023)---7.8% (2023)---8.3% (2022)---7.7% (2022)---9.1% (2021)---9.8% (2021)---6.7% (2020)--- 6.5% (2020)---7.4% (2019).  His most recent TSH was 0.602.  LDL is 51.  Renal function is stable- seen by Nephro and had medications adjusted to help with hypokalemia.  The patient has evidence of end organ damage including nephropathy, peripheral neuropathy and peripheral vascular disease.  He does not see a Podiatrist for foot care .  Last eye exam was unknown.        Hypertension   The patient presents today for follow up of HTN.  The problem is well controlled.  Risk factors for coronary artery disease include Age > 30, male, HTN, diabetes and elevated cholesterol. Current treatments include losartan (Cozaar), Spironolactone and metoprolol (Lopressor, Toprol). The patient is compliant all of the time.  Lifestyle changes the patient has made include  none .  Today the patient is complaining of peripheral edema.  He does wear compression stockings.         He has a history of CHF which required hospitalization in 2020.

## 2025-02-07 NOTE — ASSESSMENT & PLAN NOTE
S/P pericardiocentesis.  2L removed.  Records all reviewed.  Medications reconciled with changes from the hospital and pharmacy list.     Orders:    DME Order for (Specify) as OP    DME Order for Hospital Bed as OP

## 2025-02-07 NOTE — ASSESSMENT & PLAN NOTE
Patient is due for a new hospital bed and mattress.  Due to his body habitus, he requires a heavy duty bed.  With CHF, hx of pericardial effusion and history of bed sores, he needs a bed with an adjustable head and bariatric mattress to alleviate pressure that can create wounds.  Patient states that current bed is over 5 years old.     Orders:    DME Order for (Specify) as OP    DME Order for Hospital Bed as OP

## 2025-02-07 NOTE — ASSESSMENT & PLAN NOTE
Rate controlled.  Now established with [facility]. Having DCCV in 1 week.     Orders:    DME Order for (Specify) as OP    DME Order for Hospital Bed as OP

## 2025-02-07 NOTE — ASSESSMENT & PLAN NOTE
OARRS appropriate.    Orders:    HYDROcodone-acetaminophen (NORCO) 7.5-325 MG per tablet; Take 1 tablet by mouth every 6 hours as needed for Pain for up to 30 days. Intended supply: 30 days Max Daily Amount: 4 tablets    HYDROcodone-acetaminophen (NORCO) 7.5-325 MG per tablet; Take 1 tablet by mouth every 6 hours as needed for Pain for up to 30 days. Intended supply: 30 days Max Daily Amount: 4 tablets    HYDROcodone-acetaminophen (NORCO) 7.5-325 MG per tablet; Take 1 tablet by mouth every 6 hours as needed for Pain for up to 30 days. Intended supply: 30 days Max Daily Amount: 4 tablets    DME Order for (Specify) as OP    DME Order for Hospital Bed as OP

## 2025-02-07 NOTE — ASSESSMENT & PLAN NOTE
Worsening lately-- increase Buspar to 15 mg TID.    Orders:    busPIRone (BUSPAR) 15 MG tablet; Take 15 mg by mouth 3 times daily    LORazepam (ATIVAN) 1 MG tablet; Take 1 tablet by mouth 2 times daily as needed for Anxiety for up to 90 days. Max Daily Amount: 2 mg

## 2025-02-07 NOTE — ASSESSMENT & PLAN NOTE
Chronic, at goal (stable), continue current treatment plan    Orders:    Uric Acid; Future    PTH, Intact; Future    Phosphorus; Future    Magnesium; Future    Calcium, Ionized; Future    allopurinol (ZYLOPRIM) 100 MG tablet; Take 1 tablet by mouth daily

## 2025-02-12 ENCOUNTER — TELEPHONE (OUTPATIENT)
Dept: FAMILY MEDICINE CLINIC | Age: 75
End: 2025-02-12

## 2025-02-12 NOTE — TELEPHONE ENCOUNTER
Spouse states the RX for his new hospital bed needs to state on it that the bed is twisted and not safe for him to use.  Please refax to [facility].

## 2025-02-14 RX ORDER — COLCHICINE 0.6 MG/1
0.6 TABLET ORAL 2 TIMES DAILY
Qty: 180 TABLET | Refills: 1 | Status: SHIPPED | OUTPATIENT
Start: 2025-02-14

## 2025-02-14 NOTE — TELEPHONE ENCOUNTER
Patient was given Colchicine while in hospital. In need of refills.    Name of Medication(s) Requested:  Requested Prescriptions     Pending Prescriptions Disp Refills    colchicine (COLCRYS) 0.6 MG tablet 180 tablet 1     Sig: Take 1 tablet by mouth 2 times daily       Medication is on current medication list Yes and No    Dosage and directions were verified? Yes    Quantity verified: 90 day supply     Pharmacy Verified?  Yes    Last Appointment:  2/7/2025    Future appts:  No future appointments.     (If no appt send self scheduling link. .REFILLAPPT)  Scheduling request sent?     [] Yes  [x] No    Does patient need updated?  [] Yes  [x] No

## 2025-02-21 DIAGNOSIS — E11.65 TYPE 2 DIABETES MELLITUS WITH HYPERGLYCEMIA, WITHOUT LONG-TERM CURRENT USE OF INSULIN (HCC): ICD-10-CM

## 2025-02-21 RX ORDER — INSULIN GLARGINE 300 U/ML
66 INJECTION, SOLUTION SUBCUTANEOUS NIGHTLY
Qty: 6 ADJUSTABLE DOSE PRE-FILLED PEN SYRINGE | Refills: 3 | OUTPATIENT
Start: 2025-02-21

## 2025-02-21 RX ORDER — SEMAGLUTIDE 2.68 MG/ML
2 INJECTION, SOLUTION SUBCUTANEOUS WEEKLY
Qty: 9 ML | Refills: 1 | Status: SHIPPED | OUTPATIENT
Start: 2025-02-21

## 2025-02-21 RX ORDER — INSULIN GLARGINE 300 U/ML
INJECTION, SOLUTION SUBCUTANEOUS
Qty: 6 ADJUSTABLE DOSE PRE-FILLED PEN SYRINGE | Refills: 3 | Status: SHIPPED | OUTPATIENT
Start: 2025-02-21

## 2025-02-21 NOTE — TELEPHONE ENCOUNTER
Name of Medication(s) Requested:  Requested Prescriptions     Pending Prescriptions Disp Refills    semaglutide, 2 MG/DOSE, (OZEMPIC, 2 MG/DOSE,) 8 MG/3ML SOPN sc injection 9 mL 1     Sig: Inject 2 mg into the skin once a week       Medication is on current medication list Yes    Dosage and directions were verified? Yes    Quantity verified: 90 day supply     Pharmacy Verified?  Yes    Last Appointment:  2/7/2025    Future appts:  No future appointments.     (If no appt send self scheduling link. .REFILLAPPT)  Scheduling request sent?     [] Yes  [] No    Does patient need updated?  [] Yes  [] No

## 2025-02-21 NOTE — TELEPHONE ENCOUNTER
Name of Medication(s) Requested:  Requested Prescriptions     Pending Prescriptions Disp Refills    TOUJEO MAX SOLOSTAR 300 UNIT/ML concentrated injection pen [Pharmacy Med Name: TOUJEO MAX SOLOSTAR 300U/ML PEN 3ML] 12 mL      Sig: ADMINISTER 58 UNITS UNDER THE SKIN DAILY       Medication is on current medication list Yes    Dosage and directions were verified? Yes    Quantity verified: 30 day supply     Pharmacy Verified?  Yes    Last Appointment:  2/7/2025    Future appts:  No future appointments.     (If no appt send self scheduling link. .REFILLAPPT)  Scheduling request sent?     [] Yes  [] No    Does patient need updated?  [] Yes  [] No

## 2025-03-21 ENCOUNTER — TELEPHONE (OUTPATIENT)
Dept: PRIMARY CARE CLINIC | Age: 75
End: 2025-03-21

## 2025-03-21 NOTE — TELEPHONE ENCOUNTER
Pt is having episodes of hypoglycemia constantly, his numbers have been 64, 69, and he's eating all the time, they think he's on too many blood sugar pills. It's been going on since January and he forgot to tell you. It starts after his 5pm pills, which is after he takes the  glimepiride .

## 2025-03-21 NOTE — TELEPHONE ENCOUNTER
Per wife patient has an occasional episode during the day but most happen at night. He does take it with food. He uses the toujeo in the morning    Spouse/Significant other

## 2025-03-21 NOTE — TELEPHONE ENCOUNTER
Does it happen with his Glimepiride dose in the AM as well or only at night?  Is he taking with food?  When does he take the Toujeo?

## 2025-04-07 ENCOUNTER — TELEPHONE (OUTPATIENT)
Dept: FAMILY MEDICINE CLINIC | Age: 75
End: 2025-04-07

## 2025-04-07 DIAGNOSIS — M51.16 LUMBAR DISC DISEASE WITH RADICULOPATHY: ICD-10-CM

## 2025-04-07 RX ORDER — HYDROCODONE BITARTRATE AND ACETAMINOPHEN 7.5; 325 MG/1; MG/1
1 TABLET ORAL EVERY 6 HOURS PRN
Qty: 100 TABLET | Refills: 0 | Status: SHIPPED | OUTPATIENT
Start: 2025-04-07 | End: 2025-05-02

## 2025-04-07 NOTE — TELEPHONE ENCOUNTER
Pharmacist from [facility] in [place] calling this morning to let you know that She was low on Hydrocodone - Acetaminophen when Brigido stopped to fill his prescription.    She filled his script for 20 pills that day.  She does have enough now if you send a script for 100.  
I just tried to schedule Brigido for a follow up, but there is nothing available.  Do you see a spot that I could put him on your schedule?  
May 2nd at 10AM?  
Patient scheduled at 10:00am on May 2nd  
Rx sent.  Please make sure patient has his routine refill follow up scheduled otherwise he won't be able to get his refills  
done

## 2025-04-30 LAB
25-HYDROXY VITAMIN D-2: 70 NG/ML (ref 30–100)
A/G RATIO: 1.3 RATIO (ref 1.1–2.2)
ALBUMIN: 4 G/DL (ref 3.5–5)
ALP BLD-CCNC: 94 U/L (ref 42–121)
ALT SERPL-CCNC: 20 U/L (ref 7–52)
ANION GAP SERPL CALCULATED.3IONS-SCNC: 7 MEQ/L (ref 4–14)
ANISOCYTOSIS: ABNORMAL
APPEARANCE, BODY FLUID: CLEAR
AST SERPL-CCNC: 17 U/L (ref 10–41)
BANDED NEUTROPHILS RELATIVE PERCENT: 4 % (ref 0–6)
BASOPHILS ABSOLUTE: 0.15 K/UL (ref 0–0.2)
BASOPHILS RELATIVE PERCENT: 2 % (ref 0–1.5)
BILIRUB SERPL-MCNC: 0.6 MG/DL (ref 0.3–1.5)
BILIRUBIN, URINE: NEGATIVE
BLOOD, URINE: NEGATIVE
BUN BLDV-MCNC: 25 MG/DL (ref 7–25)
CALCIUM IONIZED: 1.06 MMOL/L (ref 1.12–1.33)
CALCIUM SERPL-MCNC: 9 MG/DL (ref 8.5–10.5)
CELLS COUNTED: 100
CHLORIDE BLD-SCNC: 98 MEQ/L (ref 98–107)
CHOLESTEROL, TOTAL: 105 MG/DL (ref 0–199)
CO2: 34 MEQ/L (ref 21–31)
COLOR, UA: YELLOW
CREAT SERPL-MCNC: 1.01 MG/DL (ref 0.7–1.3)
CREATININE + EGFR PANEL: 88 ML/MIN
CREATININE URINE: 78.04 MG/DL
DIAGNOSTIC PSA: 7.5 NG/ML (ref 0–4)
EOSINOPHILS ABSOLUTE: 0 K/UL (ref 0–0.33)
EOSINOPHILS RELATIVE PERCENT: 0 % (ref 0–3)
ERYTHROCYTES URINE: NORMAL /HPF
GFR NON-AFRICAN AMERICAN: 73 ML/MIN
GLOBULIN: 3.1 G/DL (ref 1.9–3.9)
GLUCOSE BLD-MCNC: 126 MG/DL (ref 70–99)
GLUCOSE URINE: >=500 MG/DL
HCT VFR BLD CALC: 47.1 % (ref 41–50)
HDLC SERPL-MCNC: 32 MG/DL
HEMOGLOBIN: 15.6 G/DL (ref 13.5–16.5)
KETONES, URINE: NEGATIVE MG/DL
LDL CHOLESTEROL: 59 MG/DL (ref 0–99)
LDL/HDL RATIO: 1.8 RATIO
LYMPHOCYTES # BLD: 21 % (ref 24–44)
LYMPHOCYTES ABSOLUTE: 1.59 K/UL (ref 1.1–4.8)
MAGNESIUM: 1.8 MEQ/L (ref 1.6–2.6)
MCH RBC QN AUTO: 26.6 PG (ref 28–34)
MCHC RBC AUTO-ENTMCNC: 33.2 G/DL (ref 33–37)
MCV RBC AUTO: 80 FL (ref 80–100)
MICROALBUMIN/CREAT 24H UR: 1 MG/DL (ref 0–1.8)
MICROALBUMIN/CREAT UR-RTO: 12.8 MG/G (ref 0–30)
MICROSCOPIC URINE: YES
MONOCYTES ABSOLUTE: 0.45 K/UL (ref 0.2–0.7)
MONOCYTES RELATIVE PERCENT: 6 % (ref 3.4–9)
MUCUS, URINE: NORMAL /HPF
NEUTROPHILS: 5.39 K/UL (ref 1.83–8.7)
NITRATE, UA: NEGATIVE
PDW BLD-RTO: 20.8 % (ref 10.9–14.3)
PH, URINE: 5 (ref 4.6–8)
PHOSPHORUS: 3.8 MG/DL (ref 2.5–4.6)
PLATELET # BLD: 262 K/UL (ref 150–450)
PLATELET ESTIMATE: ABNORMAL
PMV BLD AUTO: 8.3 FL (ref 7.4–10.4)
POIKILOCYTES: ABNORMAL
POTASSIUM SERPL-SCNC: 3.3 MEQ/L (ref 3.6–5)
PROTEIN UA: NEGATIVE MG/DL
PTH INTACT: 73 PG/ML (ref 12–88)
RBC # BLD: 5.88 M/UL (ref 4.5–5.5)
RBC # BLD: ABNORMAL 10*6/UL
REFLEX: MANUAL DIFF
SEG NEUTROPHILS: 67 % (ref 40–74)
SODIUM BLD-SCNC: 139 MEQ/L (ref 135–145)
SPECIFIC GRAVITY UA: 1.02 (ref 1–1.03)
TOTAL PROTEIN: 7.1 G/DL (ref 6.2–8)
TRANSITIONAL EPITHELIAL: NORMAL /HPF
TRIGL SERPL-MCNC: 108 MG/DL (ref 0–149)
TSH SERPL DL<=0.05 MIU/L-ACNC: 0.83 UIU/ML (ref 0.34–5.6)
URATE: 5.3 MG/DL (ref 4.4–7.6)
URINE CULTURE REFLEX: ABNORMAL
UROBILINOGEN, URINE: NEGATIVE MG/DL
WBC # BLD: 7.6 K/UL (ref 4.5–11)
WBC COUNT, UR AUTO: NORMAL /HPF
WBC URINE: ABNORMAL

## 2025-05-01 DIAGNOSIS — E11.65 TYPE 2 DIABETES MELLITUS WITH HYPERGLYCEMIA, WITHOUT LONG-TERM CURRENT USE OF INSULIN (HCC): ICD-10-CM

## 2025-05-01 LAB
ESTIMATED AVERAGE GLUCOSE: 157 MG/DL
HBA1C MFR BLD: 7.1 % (ref 4–6)

## 2025-05-01 NOTE — TELEPHONE ENCOUNTER
Last Appointment:  2/7/2025  Future Appointments   Date Time Provider Department Center   5/2/2025 10:00 AM Meeta Marquez DO [facility]

## 2025-05-02 ENCOUNTER — OFFICE VISIT (OUTPATIENT)
Dept: FAMILY MEDICINE CLINIC | Age: 75
End: 2025-05-02
Payer: MEDICARE

## 2025-05-02 VITALS
SYSTOLIC BLOOD PRESSURE: 130 MMHG | WEIGHT: 315 LBS | OXYGEN SATURATION: 97 % | HEART RATE: 77 BPM | TEMPERATURE: 98.6 F | RESPIRATION RATE: 16 BRPM | HEIGHT: 70 IN | DIASTOLIC BLOOD PRESSURE: 84 MMHG | BODY MASS INDEX: 45.1 KG/M2

## 2025-05-02 DIAGNOSIS — F06.4 ANXIETY DISORDER DUE TO MEDICAL CONDITION: ICD-10-CM

## 2025-05-02 DIAGNOSIS — I48.91 ATRIAL FIBRILLATION WITH CONTROLLED VENTRICULAR RESPONSE (HCC): ICD-10-CM

## 2025-05-02 DIAGNOSIS — M51.16 LUMBAR DISC DISEASE WITH RADICULOPATHY: ICD-10-CM

## 2025-05-02 DIAGNOSIS — K21.9 GASTROESOPHAGEAL REFLUX DISEASE WITHOUT ESOPHAGITIS: ICD-10-CM

## 2025-05-02 DIAGNOSIS — L30.4 INTERTRIGO: ICD-10-CM

## 2025-05-02 DIAGNOSIS — E11.65 TYPE 2 DIABETES MELLITUS WITH HYPERGLYCEMIA, WITHOUT LONG-TERM CURRENT USE OF INSULIN (HCC): Primary | ICD-10-CM

## 2025-05-02 DIAGNOSIS — E78.2 MIXED HYPERLIPIDEMIA: ICD-10-CM

## 2025-05-02 DIAGNOSIS — I10 BENIGN ESSENTIAL HYPERTENSION: ICD-10-CM

## 2025-05-02 DIAGNOSIS — I50.32 CHRONIC CONGESTIVE HEART FAILURE WITH LEFT VENTRICULAR DIASTOLIC DYSFUNCTION (HCC): ICD-10-CM

## 2025-05-02 LAB — URINE CULTURE REFLEX: NORMAL

## 2025-05-02 PROCEDURE — 3051F HG A1C>EQUAL 7.0%<8.0%: CPT | Performed by: FAMILY MEDICINE

## 2025-05-02 PROCEDURE — 1159F MED LIST DOCD IN RCRD: CPT | Performed by: FAMILY MEDICINE

## 2025-05-02 PROCEDURE — 99214 OFFICE O/P EST MOD 30 MIN: CPT | Performed by: FAMILY MEDICINE

## 2025-05-02 PROCEDURE — 1123F ACP DISCUSS/DSCN MKR DOCD: CPT | Performed by: FAMILY MEDICINE

## 2025-05-02 PROCEDURE — 3079F DIAST BP 80-89 MM HG: CPT | Performed by: FAMILY MEDICINE

## 2025-05-02 PROCEDURE — 1160F RVW MEDS BY RX/DR IN RCRD: CPT | Performed by: FAMILY MEDICINE

## 2025-05-02 PROCEDURE — G2211 COMPLEX E/M VISIT ADD ON: HCPCS | Performed by: FAMILY MEDICINE

## 2025-05-02 PROCEDURE — 3075F SYST BP GE 130 - 139MM HG: CPT | Performed by: FAMILY MEDICINE

## 2025-05-02 RX ORDER — POTASSIUM CHLORIDE 1500 MG/1
20 TABLET, EXTENDED RELEASE ORAL DAILY
COMMUNITY
Start: 2025-02-17

## 2025-05-02 RX ORDER — INSULIN GLARGINE 300 U/ML
66 INJECTION, SOLUTION SUBCUTANEOUS NIGHTLY
Qty: 6 ADJUSTABLE DOSE PRE-FILLED PEN SYRINGE | Refills: 1 | Status: SHIPPED | OUTPATIENT
Start: 2025-05-02 | End: 2025-07-31

## 2025-05-02 RX ORDER — ATORVASTATIN CALCIUM 20 MG/1
20 TABLET, FILM COATED ORAL DAILY
Qty: 90 TABLET | Refills: 1 | Status: SHIPPED | OUTPATIENT
Start: 2025-05-02

## 2025-05-02 RX ORDER — HYDROCODONE BITARTRATE AND ACETAMINOPHEN 7.5; 325 MG/1; MG/1
1 TABLET ORAL EVERY 6 HOURS PRN
Qty: 120 TABLET | Refills: 0 | Status: SHIPPED | OUTPATIENT
Start: 2025-05-30 | End: 2025-06-29

## 2025-05-02 RX ORDER — METOPROLOL SUCCINATE 50 MG/1
50 TABLET, EXTENDED RELEASE ORAL DAILY
Qty: 30 TABLET | Refills: 5 | Status: SHIPPED | OUTPATIENT
Start: 2025-05-02

## 2025-05-02 RX ORDER — PANTOPRAZOLE SODIUM 40 MG/1
40 TABLET, DELAYED RELEASE ORAL DAILY
Qty: 90 TABLET | Refills: 1 | Status: SHIPPED | OUTPATIENT
Start: 2025-05-02

## 2025-05-02 RX ORDER — SEMAGLUTIDE 2.68 MG/ML
2 INJECTION, SOLUTION SUBCUTANEOUS WEEKLY
Qty: 9 ML | Refills: 1 | Status: SHIPPED | OUTPATIENT
Start: 2025-05-02

## 2025-05-02 RX ORDER — BUMETANIDE 2 MG/1
2 TABLET ORAL 2 TIMES DAILY
Qty: 60 TABLET | Refills: 5 | Status: SHIPPED | OUTPATIENT
Start: 2025-05-02

## 2025-05-02 RX ORDER — ALLOPURINOL 100 MG/1
100 TABLET ORAL DAILY
Qty: 90 TABLET | Refills: 1 | Status: SHIPPED | OUTPATIENT
Start: 2025-05-02

## 2025-05-02 RX ORDER — FLURBIPROFEN SODIUM 0.3 MG/ML
1 SOLUTION/ DROPS OPHTHALMIC DAILY
Qty: 100 EACH | Refills: 5 | Status: SHIPPED | OUTPATIENT
Start: 2025-05-02

## 2025-05-02 RX ORDER — BUSPIRONE HYDROCHLORIDE 15 MG/1
15 TABLET ORAL 3 TIMES DAILY
COMMUNITY
Start: 2025-03-18

## 2025-05-02 RX ORDER — HYDROCODONE BITARTRATE AND ACETAMINOPHEN 7.5; 325 MG/1; MG/1
1 TABLET ORAL EVERY 6 HOURS PRN
Qty: 120 TABLET | Refills: 0 | Status: SHIPPED | OUTPATIENT
Start: 2025-06-27 | End: 2025-07-27

## 2025-05-02 RX ORDER — HYDROCODONE BITARTRATE AND ACETAMINOPHEN 7.5; 325 MG/1; MG/1
1 TABLET ORAL EVERY 6 HOURS PRN
Qty: 120 TABLET | Refills: 0 | Status: SHIPPED | OUTPATIENT
Start: 2025-05-02 | End: 2025-06-01

## 2025-05-02 RX ORDER — METOPROLOL TARTRATE 50 MG
50 TABLET ORAL 2 TIMES DAILY
COMMUNITY
Start: 2025-02-17 | End: 2025-05-02

## 2025-05-02 RX ORDER — METOLAZONE 2.5 MG/1
2.5 TABLET ORAL
Qty: 36 TABLET | Refills: 1 | Status: SHIPPED | OUTPATIENT
Start: 2025-05-02 | End: 2025-07-31

## 2025-05-02 RX ORDER — FLUCONAZOLE 150 MG/1
150 TABLET ORAL DAILY
Qty: 3 TABLET | Refills: 0 | Status: SHIPPED | OUTPATIENT
Start: 2025-05-02 | End: 2025-05-05

## 2025-05-02 RX ORDER — LORAZEPAM 1 MG/1
1 TABLET ORAL 2 TIMES DAILY PRN
Qty: 180 TABLET | Refills: 0 | Status: SHIPPED | OUTPATIENT
Start: 2025-05-02 | End: 2025-07-31

## 2025-05-02 RX ORDER — GLIMEPIRIDE 4 MG/1
4 TABLET ORAL 2 TIMES DAILY
Qty: 180 TABLET | Refills: 1 | Status: SHIPPED | OUTPATIENT
Start: 2025-05-02

## 2025-05-02 ASSESSMENT — ENCOUNTER SYMPTOMS
SHORTNESS OF BREATH: 0
BACK PAIN: 1
DIARRHEA: 0
ABDOMINAL PAIN: 0
NAUSEA: 0
WHEEZING: 0
VOMITING: 0
CONSTIPATION: 0
COUGH: 0

## 2025-05-02 NOTE — ASSESSMENT & PLAN NOTE
Chronic, at goal (stable), labs reviewed in detail.  Repeat in 6 months.     Orders:    empagliflozin (JARDIANCE) 25 MG tablet; Take 1 tablet by mouth Daily    glimepiride (AMARYL) 4 MG tablet; Take 1 tablet by mouth 2 times daily    Insulin Glargine, 2 Unit Dial, (TOUJEO MAX SOLOSTAR) 300 UNIT/ML concentrated injection pen; Inject 66 Units into the skin nightly    semaglutide, 2 MG/DOSE, (OZEMPIC, 2 MG/DOSE,) 8 MG/3ML SOPN sc injection; Inject 2 mg into the skin once a week    Insulin Pen Needle (B-D UF III MINI PEN NEEDLES) 31G X 5 MM MISC; 1 each by Does not apply route daily

## 2025-05-02 NOTE — ASSESSMENT & PLAN NOTE
Following with [facility] and EP    Orders:    apixaban (ELIQUIS) 5 MG TABS tablet; Take 1 tablet by mouth 2 times daily    metoprolol succinate (TOPROL XL) 50 MG extended release tablet; Take 1 tablet by mouth daily

## 2025-05-02 NOTE — ASSESSMENT & PLAN NOTE
OARRS reviewed and appropriate.    Orders:    HYDROcodone-acetaminophen (NORCO) 7.5-325 MG per tablet; Take 1 tablet by mouth every 6 hours as needed for Pain for up to 30 days. Intended supply: 30 days Max Daily Amount: 4 tablets    HYDROcodone-acetaminophen (NORCO) 7.5-325 MG per tablet; Take 1 tablet by mouth every 6 hours as needed for Pain for up to 30 days. Intended supply: 30 days Max Daily Amount: 4 tablets    HYDROcodone-acetaminophen (NORCO) 7.5-325 MG per tablet; Take 1 tablet by mouth every 6 hours as needed for Pain for up to 30 days. Intended supply: 25 days Max Daily Amount: 4 tablets

## 2025-05-02 NOTE — ASSESSMENT & PLAN NOTE
Monitored by specialist- no acute findings meriting change in the plan    Orders:    bumetanide (BUMEX) 2 MG tablet; Take 1 tablet by mouth 2 times daily    metOLazone (ZAROXOLYN) 2.5 MG tablet; Take 1 tablet by mouth three times a week

## 2025-05-02 NOTE — ASSESSMENT & PLAN NOTE
Orders:    LORazepam (ATIVAN) 1 MG tablet; Take 1 tablet by mouth 2 times daily as needed for Anxiety for up to 90 days. Max Daily Amount: 2 mg

## 2025-05-02 NOTE — PROGRESS NOTES
General: Lids are normal. No scleral icterus.     Extraocular Movements: Extraocular movements intact.      Conjunctiva/sclera: Conjunctivae normal.   Neck:      Thyroid: No thyromegaly.      Vascular: No carotid bruit.   Cardiovascular:      Rate and Rhythm: Normal rate and regular rhythm.      Heart sounds: Normal heart sounds. No murmur heard.  Pulmonary:      Effort: Pulmonary effort is normal. No respiratory distress.      Breath sounds: Normal breath sounds. No wheezing.   Musculoskeletal:         General: No tenderness or deformity.      Cervical back: Normal range of motion and neck supple.      Right lower leg: Edema present.      Left lower leg: Edema present.      Comments: Compression stockings in place   Lymphadenopathy:      Cervical: No cervical adenopathy.   Skin:     General: Skin is warm and dry.      Findings: No rash.   Neurological:      General: No focal deficit present.      Mental Status: He is alert and oriented to person, place, and time.      Gait: Gait abnormal (using wheelchair to get through office and cane for short distances).   Psychiatric:         Mood and Affect: Mood and affect normal.         Speech: Speech normal.         Behavior: Behavior normal.         Thought Content: Thought content normal.           Labs:  CBC with Differential:    Lab Results   Component Value Date/Time    WBC 7.6 04/30/2025 10:11 AM    RBC 5.88 04/30/2025 10:11 AM    HGB 15.6 04/30/2025 10:11 AM    HCT 47.1 04/30/2025 10:11 AM     04/30/2025 10:11 AM    MCV 80.0 04/30/2025 10:11 AM    MCH 26.6 04/30/2025 10:11 AM    MCHC 33.2 04/30/2025 10:11 AM    RDW 20.8 04/30/2025 10:11 AM    NRBC 1736 01/18/2025 12:10 PM    BANDSPCT 4.0 04/30/2025 10:11 AM    LYMPHOPCT 17.3 01/24/2025 06:18 AM    MONOPCT 6.0 04/30/2025 10:11 AM    EOSPCT 0.0 04/30/2025 10:11 AM    BASOPCT 2.0 04/30/2025 10:11 AM    MONOSABS 0.45 04/30/2025 10:11 AM    LYMPHSABS 1.59 04/30/2025 10:11 AM    EOSABS 0.00 04/30/2025 10:11 AM

## 2025-07-29 ENCOUNTER — OFFICE VISIT (OUTPATIENT)
Dept: FAMILY MEDICINE CLINIC | Age: 75
End: 2025-07-29
Payer: MEDICARE

## 2025-07-29 VITALS
WEIGHT: 315 LBS | HEIGHT: 70 IN | SYSTOLIC BLOOD PRESSURE: 134 MMHG | RESPIRATION RATE: 18 BRPM | TEMPERATURE: 98.2 F | DIASTOLIC BLOOD PRESSURE: 84 MMHG | BODY MASS INDEX: 45.1 KG/M2 | HEART RATE: 102 BPM | OXYGEN SATURATION: 98 %

## 2025-07-29 DIAGNOSIS — F06.4 ANXIETY DISORDER DUE TO MEDICAL CONDITION: ICD-10-CM

## 2025-07-29 DIAGNOSIS — L03.311 CELLULITIS OF ABDOMINAL WALL: Primary | ICD-10-CM

## 2025-07-29 DIAGNOSIS — R19.8 UMBILICAL BLEEDING: ICD-10-CM

## 2025-07-29 DIAGNOSIS — E11.65 TYPE 2 DIABETES MELLITUS WITH HYPERGLYCEMIA, WITHOUT LONG-TERM CURRENT USE OF INSULIN (HCC): ICD-10-CM

## 2025-07-29 DIAGNOSIS — M51.16 LUMBAR DISC DISEASE WITH RADICULOPATHY: ICD-10-CM

## 2025-07-29 DIAGNOSIS — L03.311 CELLULITIS OF ABDOMINAL WALL: ICD-10-CM

## 2025-07-29 PROCEDURE — 3051F HG A1C>EQUAL 7.0%<8.0%: CPT | Performed by: PHYSICIAN ASSISTANT

## 2025-07-29 PROCEDURE — 3079F DIAST BP 80-89 MM HG: CPT | Performed by: PHYSICIAN ASSISTANT

## 2025-07-29 PROCEDURE — 99214 OFFICE O/P EST MOD 30 MIN: CPT | Performed by: PHYSICIAN ASSISTANT

## 2025-07-29 PROCEDURE — 3075F SYST BP GE 130 - 139MM HG: CPT | Performed by: PHYSICIAN ASSISTANT

## 2025-07-29 PROCEDURE — 1123F ACP DISCUSS/DSCN MKR DOCD: CPT | Performed by: PHYSICIAN ASSISTANT

## 2025-07-29 PROCEDURE — 1159F MED LIST DOCD IN RCRD: CPT | Performed by: PHYSICIAN ASSISTANT

## 2025-07-29 RX ORDER — HYDROCODONE BITARTRATE AND ACETAMINOPHEN 7.5; 325 MG/1; MG/1
1 TABLET ORAL EVERY 6 HOURS PRN
Qty: 120 TABLET | Refills: 0 | Status: SHIPPED | OUTPATIENT
Start: 2025-07-29 | End: 2025-08-28

## 2025-07-29 RX ORDER — LORAZEPAM 1 MG/1
1 TABLET ORAL 2 TIMES DAILY PRN
Qty: 180 TABLET | Refills: 0 | Status: SHIPPED | OUTPATIENT
Start: 2025-07-29 | End: 2025-10-27

## 2025-07-29 RX ORDER — MUPIROCIN 2 %
OINTMENT (GRAM) TOPICAL
Qty: 15 G | Refills: 0 | Status: SHIPPED | OUTPATIENT
Start: 2025-07-29

## 2025-07-29 RX ORDER — DOXYCYCLINE HYCLATE 100 MG
100 TABLET ORAL 2 TIMES DAILY
Qty: 20 TABLET | Refills: 0 | Status: SHIPPED | OUTPATIENT
Start: 2025-07-29 | End: 2025-08-08

## 2025-07-29 NOTE — TELEPHONE ENCOUNTER
Name of Medication(s) Requested:  Requested Prescriptions      No prescriptions requested or ordered in this encounter       Medication is on current medication list Yes    Dosage and directions were verified? Yes    Quantity verified: 30 day supply     Pharmacy Verified?  Yes    Last Appointment:  5/2/2025    Future appts:  Future Appointments   Date Time Provider Department Center   8/7/2025  1:00 PM Juwan Walls DO [facility] DE[facility]        (If no appt send self scheduling link. .REFILLAPPT)  Scheduling request sent?     [] Yes  [] No    Does patient need updated?  [] Yes  [] No

## 2025-07-29 NOTE — PROGRESS NOTES
Chief Complaint   Wound Infection (Belly button )      History of Present Illness   Source of history provided by:  patient.    History of Present Illness  The patient is a 74-year-old male who presents for evaluation of a possible navel infection.    He suspects an infection in his navel area, which has been oozing and bleeding. This is not the first time he has experienced this issue. He has a stitch in place from a hernia repair procedure that was performed approximately 15 years ago, during which a mesh was also inserted. He is diabetic.      Denies any fever, chills, HA, recent illness, myalgias, nausea, vomiting, or lethargy.      ROS    Unless otherwise stated in this report or unable to obtain because of the patient's clinical or mental status as evidenced by the medical record, this patients's positive and negative responses for Review of Systems, constitutional, psych, eyes, ENT, cardiovascular, respiratory, gastrointestinal, neurological, genitourinary, musculoskeletal, integument systems and systems related to the presenting problem are either stated in the preceding or were not pertinent or were negative for the symptoms and/or complaints related to the medical problem.    Past Medical History:  has a past medical history of Acute pericardial effusion, DDD (degenerative disc disease), cervical, DDD (degenerative disc disease), thoracic, Diabetes mellitus (HCC), Follicular adenocarcinoma, moderately differentiated (HCC), Hyperlipidemia, Hypertension, Nodular basal cell carcinoma (BCC), Obesity, NICOLLE (obstructive sleep apnea), Peripheral edema, Stasis dermatitis, Thyroid nodule, and Vitamin D insufficiency.  Past Surgical History:  has a past surgical history that includes cervical fusion; hernia repair; Rectal surgery; Colonoscopy; sigmoidoscopy; eye surgery; Upper gastrointestinal endoscopy (06/15/2020); Pericardiocentesis (2025); and Cardiac catheterization (01/2025).  Social History:  reports that

## 2025-08-01 LAB
CULTURE: ABNORMAL
CULTURE: ABNORMAL
DIRECT EXAM: ABNORMAL
SPECIMEN DESCRIPTION: ABNORMAL

## 2025-08-05 ENCOUNTER — RESULTS FOLLOW-UP (OUTPATIENT)
Dept: FAMILY MEDICINE CLINIC | Age: 75
End: 2025-08-05

## 2025-08-14 ENCOUNTER — OFFICE VISIT (OUTPATIENT)
Dept: PRIMARY CARE CLINIC | Age: 75
End: 2025-08-14

## 2025-08-14 VITALS
HEART RATE: 78 BPM | TEMPERATURE: 97.8 F | OXYGEN SATURATION: 95 % | BODY MASS INDEX: 48.78 KG/M2 | DIASTOLIC BLOOD PRESSURE: 68 MMHG | SYSTOLIC BLOOD PRESSURE: 130 MMHG | HEIGHT: 70 IN

## 2025-08-14 DIAGNOSIS — F06.4 ANXIETY DISORDER DUE TO MEDICAL CONDITION: ICD-10-CM

## 2025-08-14 DIAGNOSIS — I48.91 ATRIAL FIBRILLATION WITH CONTROLLED VENTRICULAR RESPONSE (HCC): ICD-10-CM

## 2025-08-14 DIAGNOSIS — E78.2 MIXED HYPERLIPIDEMIA: ICD-10-CM

## 2025-08-14 DIAGNOSIS — M51.16 LUMBAR DISC DISEASE WITH RADICULOPATHY: ICD-10-CM

## 2025-08-14 DIAGNOSIS — I50.22 CHRONIC SYSTOLIC CONGESTIVE HEART FAILURE (HCC): ICD-10-CM

## 2025-08-14 DIAGNOSIS — I10 BENIGN ESSENTIAL HYPERTENSION: ICD-10-CM

## 2025-08-14 DIAGNOSIS — K21.9 GASTROESOPHAGEAL REFLUX DISEASE WITHOUT ESOPHAGITIS: ICD-10-CM

## 2025-08-14 DIAGNOSIS — I50.32 CHRONIC CONGESTIVE HEART FAILURE WITH LEFT VENTRICULAR DIASTOLIC DYSFUNCTION (HCC): ICD-10-CM

## 2025-08-14 DIAGNOSIS — E11.65 TYPE 2 DIABETES MELLITUS WITH HYPERGLYCEMIA, WITHOUT LONG-TERM CURRENT USE OF INSULIN (HCC): ICD-10-CM

## 2025-08-14 RX ORDER — HYDROCODONE BITARTRATE AND ACETAMINOPHEN 7.5; 325 MG/1; MG/1
1 TABLET ORAL EVERY 6 HOURS PRN
Qty: 120 TABLET | Refills: 0 | Status: SHIPPED | OUTPATIENT
Start: 2025-09-11 | End: 2025-10-11

## 2025-08-14 RX ORDER — METOPROLOL SUCCINATE 50 MG/1
50 TABLET, EXTENDED RELEASE ORAL DAILY
Qty: 30 TABLET | Refills: 5 | Status: SHIPPED | OUTPATIENT
Start: 2025-08-14

## 2025-08-14 RX ORDER — NYSTATIN 100000 U/G
CREAM TOPICAL
Qty: 30 G | Refills: 2 | Status: SHIPPED | OUTPATIENT
Start: 2025-08-14

## 2025-08-14 RX ORDER — HYDROCODONE BITARTRATE AND ACETAMINOPHEN 7.5; 325 MG/1; MG/1
1 TABLET ORAL EVERY 6 HOURS PRN
Qty: 120 TABLET | Refills: 0 | Status: SHIPPED | OUTPATIENT
Start: 2025-10-09 | End: 2025-11-08

## 2025-08-14 RX ORDER — PANTOPRAZOLE SODIUM 40 MG/1
40 TABLET, DELAYED RELEASE ORAL DAILY
Qty: 90 TABLET | Refills: 1 | Status: SHIPPED | OUTPATIENT
Start: 2025-08-14

## 2025-08-14 RX ORDER — ALLOPURINOL 100 MG/1
100 TABLET ORAL DAILY
Qty: 90 TABLET | Refills: 1 | Status: SHIPPED | OUTPATIENT
Start: 2025-08-14

## 2025-08-14 RX ORDER — SEMAGLUTIDE 2.68 MG/ML
2 INJECTION, SOLUTION SUBCUTANEOUS WEEKLY
Qty: 9 ML | Refills: 1 | Status: SHIPPED | OUTPATIENT
Start: 2025-08-14

## 2025-08-14 RX ORDER — LOSARTAN POTASSIUM 25 MG/1
12.5 TABLET ORAL DAILY
Qty: 15 TABLET | Refills: 5 | Status: SHIPPED | OUTPATIENT
Start: 2025-08-14

## 2025-08-14 RX ORDER — INSULIN GLARGINE 300 U/ML
66 INJECTION, SOLUTION SUBCUTANEOUS NIGHTLY
Qty: 6 ADJUSTABLE DOSE PRE-FILLED PEN SYRINGE | Refills: 1 | Status: SHIPPED | OUTPATIENT
Start: 2025-08-14 | End: 2025-11-12

## 2025-08-14 RX ORDER — METOLAZONE 2.5 MG/1
2.5 TABLET ORAL
Qty: 36 TABLET | Refills: 1 | Status: SHIPPED | OUTPATIENT
Start: 2025-08-15 | End: 2025-11-13

## 2025-08-14 RX ORDER — HYDROCODONE BITARTRATE AND ACETAMINOPHEN 7.5; 325 MG/1; MG/1
1 TABLET ORAL EVERY 6 HOURS PRN
Qty: 120 TABLET | Refills: 0 | Status: SHIPPED | OUTPATIENT
Start: 2025-08-14 | End: 2025-09-13

## 2025-08-14 RX ORDER — BUMETANIDE 2 MG/1
2 TABLET ORAL 2 TIMES DAILY
Qty: 60 TABLET | Refills: 5 | Status: SHIPPED | OUTPATIENT
Start: 2025-08-14

## 2025-08-14 RX ORDER — ATORVASTATIN CALCIUM 20 MG/1
20 TABLET, FILM COATED ORAL DAILY
Qty: 90 TABLET | Refills: 1 | Status: SHIPPED | OUTPATIENT
Start: 2025-08-14

## 2025-08-14 RX ORDER — GLIMEPIRIDE 4 MG/1
4 TABLET ORAL 2 TIMES DAILY
Qty: 180 TABLET | Refills: 1 | Status: SHIPPED | OUTPATIENT
Start: 2025-08-14

## 2025-08-14 RX ORDER — FLUTICASONE PROPIONATE 50 MCG
1 SPRAY, SUSPENSION (ML) NASAL DAILY
Qty: 16 G | Refills: 5 | Status: SHIPPED | OUTPATIENT
Start: 2025-08-14

## 2025-08-14 RX ORDER — ALBUTEROL SULFATE 90 UG/1
2 INHALANT RESPIRATORY (INHALATION) 4 TIMES DAILY PRN
Qty: 1 EACH | Refills: 11 | Status: SHIPPED | OUTPATIENT
Start: 2025-08-14

## 2025-08-14 ASSESSMENT — ENCOUNTER SYMPTOMS
ALLERGIC/IMMUNOLOGIC NEGATIVE: 1
BACK PAIN: 1
GASTROINTESTINAL NEGATIVE: 1
EYES NEGATIVE: 1
RESPIRATORY NEGATIVE: 1

## 2025-08-20 ENCOUNTER — OFFICE VISIT (OUTPATIENT)
Dept: FAMILY MEDICINE CLINIC | Age: 75
End: 2025-08-20

## 2025-08-20 VITALS
SYSTOLIC BLOOD PRESSURE: 120 MMHG | TEMPERATURE: 97.1 F | BODY MASS INDEX: 48.78 KG/M2 | OXYGEN SATURATION: 96 % | HEIGHT: 70 IN | HEART RATE: 82 BPM | DIASTOLIC BLOOD PRESSURE: 76 MMHG | RESPIRATION RATE: 17 BRPM

## 2025-08-20 DIAGNOSIS — L03.113 CELLULITIS OF FOREARM, RIGHT: Primary | ICD-10-CM

## 2025-08-21 ENCOUNTER — OFFICE VISIT (OUTPATIENT)
Dept: FAMILY MEDICINE CLINIC | Age: 75
End: 2025-08-21

## 2025-08-21 VITALS
TEMPERATURE: 97.7 F | HEIGHT: 70 IN | SYSTOLIC BLOOD PRESSURE: 112 MMHG | OXYGEN SATURATION: 94 % | WEIGHT: 315 LBS | DIASTOLIC BLOOD PRESSURE: 82 MMHG | HEART RATE: 92 BPM | BODY MASS INDEX: 45.1 KG/M2

## 2025-08-21 DIAGNOSIS — I89.1 LYMPHANGITIS: ICD-10-CM

## 2025-08-21 DIAGNOSIS — L03.113 CELLULITIS OF RIGHT UPPER EXTREMITY: Primary | ICD-10-CM

## 2025-08-21 RX ORDER — BUTYROSPERMUM PARKII(SHEA BUTTER), SIMMONDSIA CHINENSIS (JOJOBA) SEED OIL, ALOE BARBADENSIS LEAF EXTRACT .01; 1; 3.5 G/100G; G/100G; G/100G
5000 LIQUID TOPICAL
COMMUNITY
Start: 2025-01-17

## 2025-08-21 ASSESSMENT — ENCOUNTER SYMPTOMS
SHORTNESS OF BREATH: 0
WHEEZING: 0
COLOR CHANGE: 1